# Patient Record
Sex: MALE | Race: WHITE | URBAN - METROPOLITAN AREA
[De-identification: names, ages, dates, MRNs, and addresses within clinical notes are randomized per-mention and may not be internally consistent; named-entity substitution may affect disease eponyms.]

---

## 2017-10-15 ENCOUNTER — OUTPATIENT (OUTPATIENT)
Dept: OUTPATIENT SERVICES | Facility: HOSPITAL | Age: 77
LOS: 1 days | Discharge: HOME | End: 2017-10-15

## 2017-10-15 DIAGNOSIS — R10.11 RIGHT UPPER QUADRANT PAIN: ICD-10-CM

## 2018-07-13 ENCOUNTER — TRANSCRIPTION ENCOUNTER (OUTPATIENT)
Age: 78
End: 2018-07-13

## 2019-08-02 ENCOUNTER — LABORATORY RESULT (OUTPATIENT)
Age: 79
End: 2019-08-02

## 2019-08-02 ENCOUNTER — APPOINTMENT (OUTPATIENT)
Dept: HEMATOLOGY ONCOLOGY | Facility: CLINIC | Age: 79
End: 2019-08-02
Payer: MEDICARE

## 2019-08-02 VITALS
TEMPERATURE: 96.8 F | DIASTOLIC BLOOD PRESSURE: 79 MMHG | WEIGHT: 216 LBS | SYSTOLIC BLOOD PRESSURE: 158 MMHG | RESPIRATION RATE: 16 BRPM | HEART RATE: 68 BPM | BODY MASS INDEX: 34.72 KG/M2 | HEIGHT: 66 IN

## 2019-08-02 DIAGNOSIS — Z87.891 PERSONAL HISTORY OF NICOTINE DEPENDENCE: ICD-10-CM

## 2019-08-02 LAB
HCT VFR BLD CALC: 41.3 %
HGB BLD-MCNC: 14.4 G/DL
MCHC RBC-ENTMCNC: 31.9 PG
MCHC RBC-ENTMCNC: 34.9 G/DL
MCV RBC AUTO: 91.4 FL
PLATELET # BLD AUTO: 84 K/UL
PMV BLD: 10.4 FL
RBC # BLD: 4.52 M/UL
RBC # FLD: 12.4 %
WBC # FLD AUTO: 3.26 K/UL

## 2019-08-02 PROCEDURE — 99204 OFFICE O/P NEW MOD 45 MIN: CPT

## 2019-08-02 RX ORDER — PIOGLITAZONE HYDROCHLORIDE 30 MG/1
30 TABLET ORAL
Qty: 90 | Refills: 0 | Status: ACTIVE | COMMUNITY
Start: 2019-02-06

## 2019-08-02 RX ORDER — ISOSORBIDE MONONITRATE 30 MG/1
30 TABLET, EXTENDED RELEASE ORAL
Qty: 90 | Refills: 0 | Status: ACTIVE | COMMUNITY
Start: 2019-01-17

## 2019-08-02 RX ORDER — TAMSULOSIN HYDROCHLORIDE 0.4 MG/1
0.4 CAPSULE ORAL
Qty: 30 | Refills: 0 | Status: ACTIVE | COMMUNITY
Start: 2019-01-21

## 2019-08-02 RX ORDER — AZITHROMYCIN 250 MG/1
250 TABLET, FILM COATED ORAL
Qty: 6 | Refills: 0 | Status: COMPLETED | COMMUNITY
Start: 2019-05-01

## 2019-08-02 RX ORDER — DOXYCYCLINE 100 MG/1
100 TABLET, FILM COATED ORAL
Qty: 14 | Refills: 0 | Status: COMPLETED | COMMUNITY
Start: 2019-05-28

## 2019-08-02 RX ORDER — EZETIMIBE 10 MG/1
10 TABLET ORAL
Qty: 90 | Refills: 0 | Status: ACTIVE | COMMUNITY
Start: 2019-06-20

## 2019-08-02 RX ORDER — SITAGLIPTIN 100 MG/1
100 TABLET, FILM COATED ORAL
Qty: 90 | Refills: 0 | Status: COMPLETED | COMMUNITY
Start: 2019-03-27

## 2019-08-02 NOTE — HISTORY OF PRESENT ILLNESS
[de-identified] : 80 yo gentleman is evaluated for decreased platelets and wbc count, noted on CBC obtaned in JUNE 2019. Labs from 2016 showed gradual worsening of thrombocytopenia and neutropenia. Patient mentioned he has a problem with liver although he could not specify.  GI Dr Azul ordered abdomen ultrasound done on July 27, 2019 which showed fatty liver infiltration and splenomagaly. \par Denies fever/ infections\par Denies mucocutaneous bleeding\par No wt loss; no weakness

## 2019-08-02 NOTE — CONSULT LETTER
[Dear  ___] : Dear  [unfilled], [Consult Letter:] : I had the pleasure of evaluating your patient, [unfilled]. [FreeTextEntry2] : Dr Ricardo Burr MD\par Dr Ibis Azul MD

## 2019-08-02 NOTE — ASSESSMENT
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

## 2019-08-05 LAB
ALBUMIN SERPL ELPH-MCNC: 4.1 G/DL
ALP BLD-CCNC: 89 U/L
ALT SERPL-CCNC: 93 U/L
ANION GAP SERPL CALC-SCNC: 15 MMOL/L
AST SERPL-CCNC: 97 U/L
BILIRUB SERPL-MCNC: 0.4 MG/DL
BUN SERPL-MCNC: 18 MG/DL
CALCIUM SERPL-MCNC: 9.8 MG/DL
CHLORIDE SERPL-SCNC: 102 MMOL/L
CO2 SERPL-SCNC: 25 MMOL/L
CREAT SERPL-MCNC: 1.1 MG/DL
FOLATE SERPL-MCNC: >20 NG/ML
GLUCOSE SERPL-MCNC: 181 MG/DL
HBV SURFACE AB SER QL: NONREACTIVE
HBV SURFACE AG SER QL: NONREACTIVE
HCV AB SER QL: NONREACTIVE
HCV S/CO RATIO: 0.19 S/CO
POTASSIUM SERPL-SCNC: 4.6 MMOL/L
PROT SERPL-MCNC: 7.9 G/DL
RHEUMATOID FACT SER QL: <10 IU/ML
SODIUM SERPL-SCNC: 142 MMOL/L
VIT B12 SERPL-MCNC: 597 PG/ML

## 2019-08-06 LAB — SMOOTH MUSCLE AB SER QL IF: NORMAL

## 2019-08-07 LAB — ANA SER IF-ACNC: NEGATIVE

## 2019-08-09 ENCOUNTER — LABORATORY RESULT (OUTPATIENT)
Age: 79
End: 2019-08-09

## 2019-08-09 ENCOUNTER — APPOINTMENT (OUTPATIENT)
Dept: HEMATOLOGY ONCOLOGY | Facility: CLINIC | Age: 79
End: 2019-08-09
Payer: MEDICARE

## 2019-08-09 VITALS
TEMPERATURE: 96.6 F | HEIGHT: 66 IN | HEART RATE: 64 BPM | RESPIRATION RATE: 16 BRPM | WEIGHT: 216 LBS | SYSTOLIC BLOOD PRESSURE: 160 MMHG | DIASTOLIC BLOOD PRESSURE: 71 MMHG | BODY MASS INDEX: 34.72 KG/M2

## 2019-08-09 LAB
HCT VFR BLD CALC: 41.8 %
HGB BLD-MCNC: 14.2 G/DL
MCHC RBC-ENTMCNC: 31.9 PG
MCHC RBC-ENTMCNC: 34 G/DL
MCV RBC AUTO: 93.9 FL
PLATELET # BLD AUTO: 78 K/UL
PMV BLD: 9.7 FL
RBC # BLD: 4.45 M/UL
RBC # FLD: 12.7 %
WBC # FLD AUTO: 3.43 K/UL

## 2019-08-09 PROCEDURE — 99214 OFFICE O/P EST MOD 30 MIN: CPT

## 2019-08-09 NOTE — REASON FOR VISIT
[Blood Count Assessment] : blood count assessment [Follow-Up Visit] : a follow-up visit for [FreeTextEntry2] : Thrombocytopenia and Leukopenia

## 2019-08-09 NOTE — REVIEW OF SYSTEMS
[Negative] : Allergic/Immunologic [FreeTextEntry7] : reports intermittent sharp LLQ pain, no clear triggers, onset 1 day, we will continue to observe

## 2019-08-09 NOTE — HISTORY OF PRESENT ILLNESS
[de-identified] : 80 yo gentleman is evaluated for decreased platelets and wbc count, noted on CBC obtaned in JUNE 2019. Labs from 2016 showed gradual worsening of thrombocytopenia and neutropenia. Patient mentioned he has a problem with liver although he could not specify.  GI Dr Azul ordered abdomen ultrasound done on July 27, 2019 which showed fatty liver infiltration and splenomegaly. \par Denies fever/ infections\par Denies mucocutaneous bleeding\par No wt loss; no weakness\par \par US Abd (10/15/2017)  Slightly enlarged and echogenic liver likely representing hepatic steatosis.  Mild splenomegaly.  Correlation with serology is recommended.  Spleen is 14cm.  Liver 21cm. \par \par LAB WORKUP\par (6/21/19) WBC 4.27, Hgb 13.9, MCV 92.3, , PLT 81, AST 67, ALT 67, ALK Phos 79 \par (5/25/18) AST 61, ALT 64, WBC 3.8, Hgn 13.7, MCV 92.9, PLT 86, ANC 1480 [de-identified] : 8/9/19\par Patient is here for a follow-up visit.  He is feeling well with no complaints.  Most recent CBC show persistent leukopenia and thrombocytopenia; otherwise, lab workup is negative so far.  GI Dr Azul ordered abdomen ultrasound done on July 27, 2019 which showed fatty liver infiltration and splenomegaly.  Patient denies fever, chills or unintentional weight loss.

## 2019-08-09 NOTE — CONSULT LETTER
[Dear  ___] : Dear  [unfilled], [Consult Letter:] : I had the pleasure of evaluating your patient, [unfilled]. [Please see my note below.] : Please see my note below. [Consult Closing:] : Thank you very much for allowing me to participate in the care of this patient.  If you have any questions, please do not hesitate to contact me. [Sincerely,] : Sincerely, [FreeTextEntry2] : Dr Ricardo Burr MD\par Dr Bobo Azul MD [FreeTextEntry3] : Dr. Milner

## 2019-08-09 NOTE — ASSESSMENT
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 Azul\par - Hep B workup ordered\par \par RTC in 1 week

## 2019-08-11 LAB — HBV CORE IGG+IGM SER QL: NONREACTIVE

## 2019-08-12 ENCOUNTER — FORM ENCOUNTER (OUTPATIENT)
Age: 79
End: 2019-08-12

## 2019-08-13 ENCOUNTER — OUTPATIENT (OUTPATIENT)
Dept: OUTPATIENT SERVICES | Facility: HOSPITAL | Age: 79
LOS: 1 days | Discharge: HOME | End: 2019-08-13
Payer: MEDICARE

## 2019-08-13 DIAGNOSIS — D69.6 THROMBOCYTOPENIA, UNSPECIFIED: ICD-10-CM

## 2019-08-13 PROCEDURE — 74183 MRI ABD W/O CNTR FLWD CNTR: CPT | Mod: 26

## 2019-08-14 LAB — SMOOTH MUSCLE AB SER QL IF: NORMAL

## 2019-08-15 LAB
HBV E AB SER QL: NEGATIVE
HBV E AG SER QL: NEGATIVE

## 2019-08-16 ENCOUNTER — APPOINTMENT (OUTPATIENT)
Dept: HEMATOLOGY ONCOLOGY | Facility: CLINIC | Age: 79
End: 2019-08-16
Payer: MEDICARE

## 2019-08-16 ENCOUNTER — OUTPATIENT (OUTPATIENT)
Dept: OUTPATIENT SERVICES | Facility: HOSPITAL | Age: 79
LOS: 1 days | Discharge: HOME | End: 2019-08-16

## 2019-08-16 VITALS
TEMPERATURE: 96.9 F | DIASTOLIC BLOOD PRESSURE: 74 MMHG | WEIGHT: 218 LBS | SYSTOLIC BLOOD PRESSURE: 146 MMHG | RESPIRATION RATE: 16 BRPM | HEIGHT: 66 IN | HEART RATE: 70 BPM | BODY MASS INDEX: 35.03 KG/M2

## 2019-08-16 DIAGNOSIS — D69.6 THROMBOCYTOPENIA, UNSPECIFIED: ICD-10-CM

## 2019-08-16 PROCEDURE — 99215 OFFICE O/P EST HI 40 MIN: CPT

## 2019-08-16 NOTE — HISTORY OF PRESENT ILLNESS
[de-identified] : 78 yo gentleman is evaluated for decreased platelets and wbc count, noted on CBC obtaned in JUNE 2019. Labs from 2016 showed gradual worsening of thrombocytopenia and neutropenia. Patient mentioned he has a problem with liver although he could not specify.  GI Dr Azul ordered abdomen ultrasound done on July 27, 2019 which showed fatty liver infiltration and splenomegaly. Patient does note that he was in [place] [facility] [place] [facility] and was treated over 25 years ago in [place], unsure which medications.  \par Denies fever/ infections\par Denies mucocutaneous bleeding\par No wt loss; no weakness\par \par US Abd (10/15/2017)  Slightly enlarged and echogenic liver likely representing hepatic steatosis.  Mild splenomegaly.  Correlation with serology is recommended.  Spleen is 14cm.  Liver 21cm. \par \par LAB WORKUP\par (6/21/19) WBC 4.27, Hgb 13.9, MCV 92.3, , PLT 81, AST 67, ALT 67, ALK Phos 79 \par (5/25/18) AST 61, ALT 64, WBC 3.8, Hgb 13.7, MCV 92.9, PLT 86, ANC 1480 [de-identified] : 8/9/19\par Patient is here for a follow-up visit.  He is feeling well with no complaints.  Most recent CBC show persistent leukopenia and thrombocytopenia; otherwise, lab workup is negative so far.  GI Dr Azul ordered abdomen ultrasound done on July 27, 2019 which showed fatty liver infiltration and splenomegaly.  Patient denies fever, chills or unintentional weight loss.  \par \par 8/16/19\par He is here for follow-up visit.  Hepatitis B workup is negative.  No masses noted on abdominal scan, but mild hepatomegaly.  CBC from 8/9 shows persistent leukopenia and thrombocytopenia.  He is feeling well, no new complaints.  \par MR Abd (8/13/19) IMPRESSION:1.  Mild hepatomegaly, without focal hepatic lesion.2.  No biliary ductal dilatation or evidence of choledocholithiasis.

## 2019-08-16 NOTE — REASON FOR VISIT
[Follow-Up Visit] : a follow-up visit for [Blood Count Assessment] : blood count assessment [FreeTextEntry2] : Thrombocytopenia and Leukopenia

## 2019-08-16 NOTE — ASSESSMENT
[FreeTextEntry1] : 78 yo man with ECOG 1 and medical problems of DM and HTN, is referred for evaluation of low platelets and neutropenia since at least 2016, gradually worsening: this might be attributed to liver disease. Ultrasound suggested fatty liver and splenomegaly. MRI (08/2019) showed hepatomegaly and no focal lesions.\par \par - suspect that cytopenias are due to liver disease:  follow-up with GI and/or Hepatologist for liver workup\par - discussed that his condition is likely caused by liver condition, as supported by elevated AST/ALT\par - encouraged dietary modification and exercise for weight loss\par - bone marrow biopsy  not warranted at this time, will continue to monitor counts, if the counts worsen or do not improve despite liver improvement,  we may re-evaluate\par - noted that pt has been treated for infectious disease in 80s-90s while worked in [place]. Not clear what was the etiology and what treatment he received, to determine if there was an effect on liver or/and on bone marrow: at this time his counts have been stable since at least 5 years ago and he is not symptomatic from cytopenias\par \par RTC in 3 months, ideally following eval by hepatology; with CBC, CMP, PT/INR/ aPTT two days prior

## 2019-08-16 NOTE — CONSULT LETTER
[Consult Letter:] : I had the pleasure of evaluating your patient, [unfilled]. [Dear  ___] : Dear  [unfilled], [Please see my note below.] : Please see my note below. [Consult Closing:] : Thank you very much for allowing me to participate in the care of this patient.  If you have any questions, please do not hesitate to contact me. [Sincerely,] : Sincerely, [FreeTextEntry2] : Dr. Ricardo Burr MD\par  [FreeTextEntry3] : Juancho Milner DO\par Attending Physician,\par Hematology/ Medical Oncology\par [phone] office\par \par

## 2019-11-15 ENCOUNTER — OUTPATIENT (OUTPATIENT)
Dept: OUTPATIENT SERVICES | Facility: HOSPITAL | Age: 79
LOS: 1 days | Discharge: HOME | End: 2019-11-15
Payer: MEDICARE

## 2019-11-15 ENCOUNTER — APPOINTMENT (OUTPATIENT)
Dept: HEMATOLOGY ONCOLOGY | Facility: CLINIC | Age: 79
End: 2019-11-15

## 2019-11-15 DIAGNOSIS — D69.6 THROMBOCYTOPENIA, UNSPECIFIED: ICD-10-CM

## 2019-11-15 PROCEDURE — 74183 MRI ABD W/O CNTR FLWD CNTR: CPT | Mod: 26

## 2019-12-06 ENCOUNTER — APPOINTMENT (OUTPATIENT)
Dept: HEMATOLOGY ONCOLOGY | Facility: CLINIC | Age: 79
End: 2019-12-06
Payer: MEDICARE

## 2019-12-06 ENCOUNTER — LABORATORY RESULT (OUTPATIENT)
Age: 79
End: 2019-12-06

## 2019-12-06 ENCOUNTER — OUTPATIENT (OUTPATIENT)
Dept: OUTPATIENT SERVICES | Facility: HOSPITAL | Age: 79
LOS: 1 days | Discharge: HOME | End: 2019-12-06

## 2019-12-06 VITALS
TEMPERATURE: 98.2 F | HEART RATE: 77 BPM | DIASTOLIC BLOOD PRESSURE: 62 MMHG | SYSTOLIC BLOOD PRESSURE: 138 MMHG | HEIGHT: 66 IN | BODY MASS INDEX: 36 KG/M2 | RESPIRATION RATE: 16 BRPM | WEIGHT: 224 LBS

## 2019-12-06 DIAGNOSIS — Z00.00 ENCOUNTER FOR GENERAL ADULT MEDICAL EXAMINATION W/OUT ABNORMAL FINDINGS: ICD-10-CM

## 2019-12-06 LAB
ALBUMIN SERPL ELPH-MCNC: 4.1 G/DL
ALP BLD-CCNC: 79 U/L
ALT SERPL-CCNC: 50 U/L
ANION GAP SERPL CALC-SCNC: 16 MMOL/L
AST SERPL-CCNC: 50 U/L
BILIRUB SERPL-MCNC: 0.4 MG/DL
BUN SERPL-MCNC: 14 MG/DL
CALCIUM SERPL-MCNC: 9 MG/DL
CHLORIDE SERPL-SCNC: 102 MMOL/L
CO2 SERPL-SCNC: 21 MMOL/L
CREAT SERPL-MCNC: 1 MG/DL
GLUCOSE SERPL-MCNC: 262 MG/DL
HCT VFR BLD CALC: 39.2 %
HGB BLD-MCNC: 13.4 G/DL
MCHC RBC-ENTMCNC: 31.8 PG
MCHC RBC-ENTMCNC: 34.2 G/DL
MCV RBC AUTO: 93.1 FL
PLATELET # BLD AUTO: 65 K/UL
PMV BLD: 9.5 FL
POTASSIUM SERPL-SCNC: 4.9 MMOL/L
PROT SERPL-MCNC: 6.9 G/DL
RBC # BLD: 4.21 M/UL
RBC # FLD: 12.3 %
SODIUM SERPL-SCNC: 139 MMOL/L
WBC # FLD AUTO: 2.82 K/UL

## 2019-12-06 PROCEDURE — 99213 OFFICE O/P EST LOW 20 MIN: CPT

## 2019-12-06 NOTE — CONSULT LETTER
[Dear  ___] : Dear  [unfilled], [Consult Letter:] : I had the pleasure of evaluating your patient, [unfilled]. [Please see my note below.] : Please see my note below. [Consult Closing:] : Thank you very much for allowing me to participate in the care of this patient.  If you have any questions, please do not hesitate to contact me. [Sincerely,] : Sincerely, [FreeTextEntry2] : Dr. Ricardo Burr MD\par  [FreeTextEntry3] : Juancho Milner DO\par Attending Physician,\par Hematology/ Medical Oncology\par [phone] office\par \par

## 2019-12-06 NOTE — ASSESSMENT
[FreeTextEntry1] : 78 yo man with ECOG 1 and medical problems of DM and HTN, is referred for evaluation of low platelets and neutropenia since at least 2016, gradually worsening: this might be attributed to liver disease. Ultrasound suggested fatty liver and splenomegaly. MRI (08/2019) showed hepatomegaly and no focal lesions.\par \par - suspect that cytopenias are due to liver disease:  follow-up with GI and/or Hepatologist for liver workup\par - discussed that his condition is likely caused by liver condition, as supported by elevated AST/ALT; HE DID NOT SEE GI. Encouraged again to see a liver specialist; recommend to repeat cbc in 3 mos; if decrease is dramatic, will offer bone marrow bx\par - encouraged dietary modification and exercise for weight loss\par - bone marrow biopsy  not warranted at this time, will continue to monitor counts, if the counts worsen or do not improve despite liver improvement,  we may re-evaluate\par - noted that pt has been treated for infectious disease in 80s-90s while worked in [place]. Not clear what was the etiology and what treatment he received, to determine if there was an effect on liver or/and on bone marrow: at this time his counts have been stable since at least 5 years ago and he is not symptomatic from cytopenias\par \par RTC in 3 months, ideally following eval by hepatology; with CBC, CMP, PT/INR/ aPTT two days prior

## 2019-12-06 NOTE — PHYSICAL EXAM
[Restricted in physically strenuous activity but ambulatory and able to carry out work of a light or sedentary nature] : Status 1- Restricted in physically strenuous activity but ambulatory and able to carry out work of a light or sedentary nature, e.g., light house work, office work [Obese] : obese [Normal] : grossly intact

## 2019-12-06 NOTE — HISTORY OF PRESENT ILLNESS
[de-identified] : 80 yo gentleman is evaluated for decreased platelets and wbc count, noted on CBC obtaned in JUNE 2019. Labs from 2016 showed gradual worsening of thrombocytopenia and neutropenia. Patient mentioned he has a problem with liver although he could not specify.  GI Dr Azul ordered abdomen ultrasound done on July 27, 2019 which showed fatty liver infiltration and splenomegaly. Patient does note that he was in [place] [facility] [place] [facility] and was treated over 25 years ago in [place], unsure which medications.  \par Denies fever/ infections\par Denies mucocutaneous bleeding\par No wt loss; no weakness\par \par US Abd (10/15/2017)  Slightly enlarged and echogenic liver likely representing hepatic steatosis.  Mild splenomegaly.  Correlation with serology is recommended.  Spleen is 14cm.  Liver 21cm. \par \par LAB WORKUP\par (6/21/19) WBC 4.27, Hgb 13.9, MCV 92.3, , PLT 81, AST 67, ALT 67, ALK Phos 79 \par (5/25/18) AST 61, ALT 64, WBC 3.8, Hgb 13.7, MCV 92.9, PLT 86, ANC 1480 [de-identified] : 8/9/19\par Patient is here for a follow-up visit.  He is feeling well with no complaints.  Most recent CBC show persistent leukopenia and thrombocytopenia; otherwise, lab workup is negative so far.  GI Dr Azul ordered abdomen ultrasound done on July 27, 2019 which showed fatty liver infiltration and splenomegaly.  Patient denies fever, chills or unintentional weight loss.  \par \par 8/16/19\par He is here for follow-up visit.  Hepatitis B workup is negative.  No masses noted on abdominal scan, but mild hepatomegaly.  CBC from 8/9 shows persistent leukopenia and thrombocytopenia.  He is feeling well, no new complaints.  \par MR Abd (8/13/19) IMPRESSION:1.  Mild hepatomegaly, without focal hepatic lesion.2.  No biliary ductal dilatation or evidence of choledocholithiasis.\par \par EXAM:  MR ABDOMEN WAW IC      \par \par \par PROCEDURE DATE:  11/15/2019  \par \par \par \par \par INTERPRETATION:  CLINICAL HISTORY : Thrombocytopenia. Abnormal LFTs.\par \par TECHNIQUE: Multiplanar multisequential MRI images of the abdomen were \par performed before and after the administration of 10 cc Gadavist \par intravenous contrast.\par \par COMPARISON: MRI of the abdomen performed 8/13/2019.\par \par FINDINGS:\par \par HEPATOBILIARY: Mild hepatic steatosis and hepatomegaly. Stable \par subcentimeter right hepatic lobe cyst (series 7/29). No extrahepatic or \par intrahepatic biliary ductal dilation. The gallbladder is unremarkable\par \par SPLEEN: Unremarkable.\par \par PANCREAS: Unremarkable. No pancreatic ductal dilation.\par \par ADRENAL GLANDS: Unremarkable.\par \par KIDNEYS: Symmetric renal enhancement. No hydronephrosis.\par \par ABDOMINAL NODES: No adenopathy.\par \par NOTE: Haziness is noted around the inferior mesenteric artery proximally. \par Findings may be related to a vasculitis and a CT angiogram is recommended \par for further evaluation.\par \par IMPRESSION:\par \par 1. Mild Hepatomegaly and hepatic steatosis.\par \par 2. Haziness is noted around the inferior mesenteric artery proximally. \par \par Findings may be related to a vasculitis and a CT angiogram is recommended \par for further evaluation.\par \par \par \par \par GUZMAN BRENNAN M.D., RESIDENT RADIOLOGIST\par This document has been electronically signed.\par CÉSAR HAYES M.D., ATTENDING RADIOLOGIST\par This document has been electronically signed. Nov 15 2019 11:17AM\par   \par \par   \par \par \par [id number]^RI^DC

## 2019-12-09 LAB
APTT BLD: 31.7 SEC
INR PPP: 1.15 RATIO
PT BLD: 13.2 SEC

## 2019-12-10 DIAGNOSIS — D69.6 THROMBOCYTOPENIA, UNSPECIFIED: ICD-10-CM

## 2020-03-06 ENCOUNTER — APPOINTMENT (OUTPATIENT)
Dept: HEMATOLOGY ONCOLOGY | Facility: CLINIC | Age: 80
End: 2020-03-06

## 2020-03-13 ENCOUNTER — APPOINTMENT (OUTPATIENT)
Dept: CARDIOLOGY | Facility: CLINIC | Age: 80
End: 2020-03-13
Payer: MEDICARE

## 2020-03-13 PROCEDURE — 99214 OFFICE O/P EST MOD 30 MIN: CPT

## 2020-03-13 PROCEDURE — 93000 ELECTROCARDIOGRAM COMPLETE: CPT

## 2020-03-25 ENCOUNTER — APPOINTMENT (OUTPATIENT)
Dept: CARDIOLOGY | Facility: CLINIC | Age: 80
End: 2020-03-25
Payer: MEDICARE

## 2020-03-25 PROCEDURE — 93306 TTE W/DOPPLER COMPLETE: CPT

## 2020-11-19 ENCOUNTER — OUTPATIENT (OUTPATIENT)
Dept: OUTPATIENT SERVICES | Facility: HOSPITAL | Age: 80
LOS: 1 days | Discharge: HOME | End: 2020-11-19
Payer: MEDICARE

## 2020-11-19 PROCEDURE — 71046 X-RAY EXAM CHEST 2 VIEWS: CPT | Mod: 26

## 2020-11-24 ENCOUNTER — APPOINTMENT (OUTPATIENT)
Dept: CARDIOLOGY | Facility: CLINIC | Age: 80
End: 2020-11-24
Payer: MEDICARE

## 2020-11-24 VITALS
BODY MASS INDEX: 37.73 KG/M2 | SYSTOLIC BLOOD PRESSURE: 110 MMHG | DIASTOLIC BLOOD PRESSURE: 55 MMHG | WEIGHT: 234.8 LBS | HEIGHT: 66 IN

## 2020-11-24 VITALS — HEART RATE: 73 BPM

## 2020-11-24 PROCEDURE — 99214 OFFICE O/P EST MOD 30 MIN: CPT

## 2020-11-24 PROCEDURE — 93000 ELECTROCARDIOGRAM COMPLETE: CPT

## 2020-11-24 NOTE — REASON FOR VISIT
[FreeTextEntry1] : Patient presents for an over due follow up visit. He has developed progressive dyspnea since his last visit. He never went for the requested adenosine thallium stress test.

## 2020-11-24 NOTE — DISCUSSION/SUMMARY
[FreeTextEntry1] : IV adenosine thallium will be reordered. The patient never went for the test as was requested on his prior visit.\par 2D echo doppler to exclude progression of his AS\par RV in 2 weeks.

## 2020-11-24 NOTE — PHYSICAL EXAM
[General Appearance - Well Developed] : well developed [Normal Appearance] : normal appearance [General Appearance - In No Acute Distress] : no acute distress [Normal Conjunctiva] : the conjunctiva exhibited no abnormalities [Normal Oropharynx] : normal oropharynx [Normal Jugular Venous V Waves Present] : normal jugular venous V waves present [Auscultation Breath Sounds / Voice Sounds] : lungs were clear to auscultation bilaterally [Heart Rate And Rhythm] : heart rate and rhythm were normal [Heart Sounds] : normal S1 and S2 [Arterial Pulses Normal] : the arterial pulses were normal [Edema] : no peripheral edema present [FreeTextEntry1] : Grade III/VI MARIELOS over aortic area [Abdomen Soft] : soft [Abnormal Walk] : normal gait [Skin Turgor] : normal skin turgor [] : no rash [No Venous Stasis] : no venous stasis [Impaired Insight] : insight and judgment were intact

## 2020-11-24 NOTE — REVIEW OF SYSTEMS
[Shortness Of Breath] : shortness of breath [Dyspnea on exertion] : dyspnea during exertion [Chest  Pressure] : chest pressure [Lower Ext Edema] : no extremity edema [Leg Claudication] : no intermittent leg claudication [Palpitations] : no palpitations [Negative] : Heme/Lymph

## 2020-12-12 ENCOUNTER — APPOINTMENT (OUTPATIENT)
Dept: CARDIOLOGY | Facility: CLINIC | Age: 80
End: 2020-12-12
Payer: MEDICARE

## 2020-12-12 PROCEDURE — 93306 TTE W/DOPPLER COMPLETE: CPT

## 2020-12-12 PROCEDURE — 99072 ADDL SUPL MATRL&STAF TM PHE: CPT

## 2020-12-14 ENCOUNTER — OUTPATIENT (OUTPATIENT)
Dept: OUTPATIENT SERVICES | Facility: HOSPITAL | Age: 80
LOS: 1 days | Discharge: HOME | End: 2020-12-14

## 2020-12-14 ENCOUNTER — LABORATORY RESULT (OUTPATIENT)
Age: 80
End: 2020-12-14

## 2020-12-14 DIAGNOSIS — Z11.59 ENCOUNTER FOR SCREENING FOR OTHER VIRAL DISEASES: ICD-10-CM

## 2020-12-16 ENCOUNTER — RESULT REVIEW (OUTPATIENT)
Age: 80
End: 2020-12-16

## 2020-12-16 ENCOUNTER — OUTPATIENT (OUTPATIENT)
Dept: OUTPATIENT SERVICES | Facility: HOSPITAL | Age: 80
LOS: 1 days | Discharge: HOME | End: 2020-12-16
Payer: MEDICARE

## 2020-12-16 DIAGNOSIS — R07.9 CHEST PAIN, UNSPECIFIED: ICD-10-CM

## 2020-12-16 PROCEDURE — 93018 CV STRESS TEST I&R ONLY: CPT

## 2020-12-16 PROCEDURE — 93016 CV STRESS TEST SUPVJ ONLY: CPT

## 2020-12-16 PROCEDURE — 78452 HT MUSCLE IMAGE SPECT MULT: CPT | Mod: 26

## 2020-12-16 RX ORDER — ADENOSINE 3 MG/ML
60 INJECTION INTRAVENOUS ONCE
Refills: 0 | Status: COMPLETED | OUTPATIENT
Start: 2020-12-16 | End: 2020-12-16

## 2020-12-16 RX ADMIN — ADENOSINE 600 MILLIGRAM(S): 3 INJECTION INTRAVENOUS at 10:13

## 2021-01-26 ENCOUNTER — APPOINTMENT (OUTPATIENT)
Dept: SURGERY | Facility: CLINIC | Age: 81
End: 2021-01-26
Payer: MEDICARE

## 2021-01-26 VITALS
HEIGHT: 67 IN | OXYGEN SATURATION: 98 % | TEMPERATURE: 97.2 F | WEIGHT: 235 LBS | HEART RATE: 73 BPM | BODY MASS INDEX: 36.88 KG/M2

## 2021-01-26 VITALS — DIASTOLIC BLOOD PRESSURE: 75 MMHG | SYSTOLIC BLOOD PRESSURE: 118 MMHG

## 2021-01-26 PROCEDURE — 99072 ADDL SUPL MATRL&STAF TM PHE: CPT

## 2021-01-26 PROCEDURE — 99202 OFFICE O/P NEW SF 15 MIN: CPT

## 2021-01-26 RX ORDER — LISINOPRIL 2.5 MG/1
2.5 TABLET ORAL
Qty: 90 | Refills: 0 | Status: COMPLETED | COMMUNITY
Start: 2019-06-20 | End: 2021-01-26

## 2021-01-26 RX ORDER — GUAIFENESIN AND CODEINE PHOSPHATE 10; 100 MG/5ML; MG/5ML
100-10 SOLUTION ORAL
Qty: 120 | Refills: 0 | Status: COMPLETED | COMMUNITY
Start: 2019-05-28 | End: 2021-01-26

## 2021-01-29 ENCOUNTER — OUTPATIENT (OUTPATIENT)
Dept: OUTPATIENT SERVICES | Facility: HOSPITAL | Age: 81
LOS: 1 days | Discharge: HOME | End: 2021-01-29
Payer: MEDICARE

## 2021-01-29 DIAGNOSIS — E78.2 MIXED HYPERLIPIDEMIA: ICD-10-CM

## 2021-01-29 DIAGNOSIS — M54.6 PAIN IN THORACIC SPINE: ICD-10-CM

## 2021-01-29 DIAGNOSIS — R10.811 RIGHT UPPER QUADRANT ABDOMINAL TENDERNESS: ICD-10-CM

## 2021-01-29 DIAGNOSIS — R10.9 UNSPECIFIED ABDOMINAL PAIN: ICD-10-CM

## 2021-01-29 DIAGNOSIS — I10 ESSENTIAL (PRIMARY) HYPERTENSION: ICD-10-CM

## 2021-01-29 PROCEDURE — 71260 CT THORAX DX C+: CPT | Mod: 26

## 2021-01-29 PROCEDURE — 74177 CT ABD & PELVIS W/CONTRAST: CPT | Mod: 26

## 2021-02-11 NOTE — REVIEW OF SYSTEMS
[Negative] : Constitutional [Abdominal Pain] : no abdominal pain [Vomiting] : no vomiting [Constipation] : no constipation [Diarrhea] : no diarrhea [Heartburn] : no heartburn

## 2021-02-11 NOTE — PHYSICAL EXAM
[de-identified] : Elderly male ambulatory not happy [de-identified] : Tenderness right upper back chest wall posteriorly. No palpable lipoma or soft tissue mass

## 2021-02-11 NOTE — HISTORY OF PRESENT ILLNESS
[de-identified] : 79 year old male with multiple medical issues presents for possible excision of lipoma  as told by urgent care as his cause of pain. Patient had c/o pain right back for past 2 weeks and is not relieved.

## 2021-02-11 NOTE — ASSESSMENT
[FreeTextEntry1] : Elderly male with multiple prior medical issues presents for right back pain and insist upon having surgery to take out the lipoma to take care of his pain.Patient clinically does not have lipoma at the site and was so advised and suggested to follow up his PMD regarding the pain and workup prior to any decision regarding surgery.

## 2022-05-28 ENCOUNTER — EMERGENCY (EMERGENCY)
Facility: HOSPITAL | Age: 82
LOS: 0 days | Discharge: HOME | End: 2022-05-28
Attending: EMERGENCY MEDICINE | Admitting: EMERGENCY MEDICINE
Payer: MEDICARE

## 2022-05-28 VITALS
WEIGHT: 223.11 LBS | TEMPERATURE: 98 F | DIASTOLIC BLOOD PRESSURE: 69 MMHG | OXYGEN SATURATION: 99 % | RESPIRATION RATE: 18 BRPM | HEART RATE: 83 BPM | SYSTOLIC BLOOD PRESSURE: 123 MMHG

## 2022-05-28 DIAGNOSIS — R20.2 PARESTHESIA OF SKIN: ICD-10-CM

## 2022-05-28 DIAGNOSIS — M79.661 PAIN IN RIGHT LOWER LEG: ICD-10-CM

## 2022-05-28 DIAGNOSIS — M62.838 OTHER MUSCLE SPASM: ICD-10-CM

## 2022-05-28 DIAGNOSIS — E11.9 TYPE 2 DIABETES MELLITUS WITHOUT COMPLICATIONS: ICD-10-CM

## 2022-05-28 DIAGNOSIS — M79.662 PAIN IN LEFT LOWER LEG: ICD-10-CM

## 2022-05-28 DIAGNOSIS — R45.1 RESTLESSNESS AND AGITATION: ICD-10-CM

## 2022-05-28 DIAGNOSIS — D69.6 THROMBOCYTOPENIA, UNSPECIFIED: ICD-10-CM

## 2022-05-28 DIAGNOSIS — E78.5 HYPERLIPIDEMIA, UNSPECIFIED: ICD-10-CM

## 2022-05-28 DIAGNOSIS — I10 ESSENTIAL (PRIMARY) HYPERTENSION: ICD-10-CM

## 2022-05-28 LAB
ALBUMIN SERPL ELPH-MCNC: 4.1 G/DL — SIGNIFICANT CHANGE UP (ref 3.5–5.2)
ALP SERPL-CCNC: 67 U/L — SIGNIFICANT CHANGE UP (ref 30–115)
ALT FLD-CCNC: 41 U/L — SIGNIFICANT CHANGE UP (ref 0–41)
ANION GAP SERPL CALC-SCNC: 12 MMOL/L — SIGNIFICANT CHANGE UP (ref 7–14)
AST SERPL-CCNC: 37 U/L — SIGNIFICANT CHANGE UP (ref 0–41)
BASOPHILS # BLD AUTO: 0 K/UL — SIGNIFICANT CHANGE UP (ref 0–0.2)
BASOPHILS NFR BLD AUTO: 0 % — SIGNIFICANT CHANGE UP (ref 0–1)
BILIRUB SERPL-MCNC: 0.4 MG/DL — SIGNIFICANT CHANGE UP (ref 0.2–1.2)
BUN SERPL-MCNC: 18 MG/DL — SIGNIFICANT CHANGE UP (ref 10–20)
CALCIUM SERPL-MCNC: 9.4 MG/DL — SIGNIFICANT CHANGE UP (ref 8.5–10.1)
CHLORIDE SERPL-SCNC: 105 MMOL/L — SIGNIFICANT CHANGE UP (ref 98–110)
CO2 SERPL-SCNC: 21 MMOL/L — SIGNIFICANT CHANGE UP (ref 17–32)
CREAT SERPL-MCNC: 1.1 MG/DL — SIGNIFICANT CHANGE UP (ref 0.7–1.5)
EGFR: 67 ML/MIN/1.73M2 — SIGNIFICANT CHANGE UP
EOSINOPHIL # BLD AUTO: 0.03 K/UL — SIGNIFICANT CHANGE UP (ref 0–0.7)
EOSINOPHIL NFR BLD AUTO: 0.9 % — SIGNIFICANT CHANGE UP (ref 0–8)
GLUCOSE SERPL-MCNC: 100 MG/DL — HIGH (ref 70–99)
HCT VFR BLD CALC: 38.8 % — LOW (ref 42–52)
HGB BLD-MCNC: 13.2 G/DL — LOW (ref 14–18)
LYMPHOCYTES # BLD AUTO: 1.39 K/UL — SIGNIFICANT CHANGE UP (ref 1.2–3.4)
LYMPHOCYTES # BLD AUTO: 41.1 % — SIGNIFICANT CHANGE UP (ref 20.5–51.1)
MAGNESIUM SERPL-MCNC: 1.8 MG/DL — SIGNIFICANT CHANGE UP (ref 1.8–2.4)
MANUAL SMEAR VERIFICATION: SIGNIFICANT CHANGE UP
MCHC RBC-ENTMCNC: 31.6 PG — HIGH (ref 27–31)
MCHC RBC-ENTMCNC: 34 G/DL — SIGNIFICANT CHANGE UP (ref 32–37)
MCV RBC AUTO: 92.8 FL — SIGNIFICANT CHANGE UP (ref 80–94)
MONOCYTES # BLD AUTO: 0.3 K/UL — SIGNIFICANT CHANGE UP (ref 0.1–0.6)
MONOCYTES NFR BLD AUTO: 8.9 % — SIGNIFICANT CHANGE UP (ref 1.7–9.3)
NEUTROPHILS # BLD AUTO: 1.65 K/UL — SIGNIFICANT CHANGE UP (ref 1.4–6.5)
NEUTROPHILS NFR BLD AUTO: 49.1 % — SIGNIFICANT CHANGE UP (ref 42.2–75.2)
NRBC # BLD: 3 /100 — HIGH (ref 0–0)
NRBC # BLD: SIGNIFICANT CHANGE UP /100 WBCS (ref 0–0)
PLAT MORPH BLD: NORMAL — SIGNIFICANT CHANGE UP
PLATELET # BLD AUTO: 78 K/UL — LOW (ref 130–400)
POTASSIUM SERPL-MCNC: 4.5 MMOL/L — SIGNIFICANT CHANGE UP (ref 3.5–5)
POTASSIUM SERPL-SCNC: 4.5 MMOL/L — SIGNIFICANT CHANGE UP (ref 3.5–5)
PROT SERPL-MCNC: 6.9 G/DL — SIGNIFICANT CHANGE UP (ref 6–8)
RBC # BLD: 4.18 M/UL — LOW (ref 4.7–6.1)
RBC # FLD: 13 % — SIGNIFICANT CHANGE UP (ref 11.5–14.5)
RBC BLD AUTO: NORMAL — SIGNIFICANT CHANGE UP
SODIUM SERPL-SCNC: 138 MMOL/L — SIGNIFICANT CHANGE UP (ref 135–146)
WBC # BLD: 3.37 K/UL — LOW (ref 4.8–10.8)
WBC # FLD AUTO: 3.37 K/UL — LOW (ref 4.8–10.8)

## 2022-05-28 PROCEDURE — 99285 EMERGENCY DEPT VISIT HI MDM: CPT

## 2022-05-28 PROCEDURE — 93970 EXTREMITY STUDY: CPT | Mod: 26

## 2022-05-28 PROCEDURE — 99282 EMERGENCY DEPT VISIT SF MDM: CPT

## 2022-05-28 NOTE — ED PROVIDER NOTE - CLINICAL SUMMARY MEDICAL DECISION MAKING FREE TEXT BOX
doppler prelim neg for dvt. labs unremarkable, thrombocytopenia at baseline- pt informed, rec tylenol prn, close f/u pmd 1-2 weeks, strict return precautions provided

## 2022-05-28 NOTE — ED PROVIDER NOTE - PATIENT PORTAL LINK FT
You can access the FollowMyHealth Patient Portal offered by [facility] by registering at the following website: [email or web address]. By joining [facility]’s FollowMyHealth portal, you will also be able to view your health information using other applications (apps) compatible with our system.

## 2022-05-28 NOTE — ED PROVIDER NOTE - ATTENDING APP SHARED VISIT CONTRIBUTION OF CARE
82M PMH DM HTN HL thrombocytopenia, p/w 2 days of bilat lower leg discomfort and restlessness. tingling to bilat LE up to knees. muscle cramps associated. no swelling or redness. no trauma. no numbness, weakness. no cp, sob. pt concern for dvt.     on exam, AFVSS, well yen nad, ncat, eomi, perrla, mmm, lctab, rrr nl s1s2 no mrg, abd soft ntnd, aaox3, no focal deficits, no le edema or calf ttp, normal gait, 2+ dp pulse bilaterally bounding    a/p; LE discomfort bilat, labs, venous doppler re-eval

## 2022-05-28 NOTE — ED PROVIDER NOTE - IV ALTEPLASE EXCL REL HIDDEN
Chart reviewed and it appears that patient has minimal needs for discharge at this time. Discussed with patients nurse and requested that case management be notified if discharge needs are identified. Low risk for hospital readmission (6%). Case management will continue to follow progress and update discharge plan as needed.     Electronically signed by CASSY Oneal on 2/20/2022 at 1:01 PM
show

## 2022-05-28 NOTE — ED ADULT TRIAGE NOTE - CHIEF COMPLAINT QUOTE
Patient c/o bilateral lower extremity pain and restlessness x 2 days. No swelling or redness noted in triage

## 2022-05-28 NOTE — ED PROVIDER NOTE - NS ED ATTENDING STATEMENT MOD
This was a shared visit with the TERE. I reviewed and verified the documentation and independently performed the documented:

## 2022-07-15 ENCOUNTER — INPATIENT (INPATIENT)
Facility: HOSPITAL | Age: 82
LOS: 0 days | Discharge: LEFT BEFORE DISCHARGE | End: 2022-07-16
Attending: HOSPITALIST | Admitting: HOSPITALIST

## 2022-07-15 VITALS
HEART RATE: 104 BPM | SYSTOLIC BLOOD PRESSURE: 128 MMHG | DIASTOLIC BLOOD PRESSURE: 58 MMHG | RESPIRATION RATE: 18 BRPM | OXYGEN SATURATION: 100 %

## 2022-07-15 DIAGNOSIS — F03.90 UNSPECIFIED DEMENTIA WITHOUT BEHAVIORAL DISTURBANCE: ICD-10-CM

## 2022-07-15 LAB
ALBUMIN SERPL ELPH-MCNC: 4.6 G/DL — SIGNIFICANT CHANGE UP (ref 3.5–5.2)
ALP SERPL-CCNC: 65 U/L — SIGNIFICANT CHANGE UP (ref 30–115)
ALT FLD-CCNC: 43 U/L — HIGH (ref 0–41)
ANION GAP SERPL CALC-SCNC: 13 MMOL/L — SIGNIFICANT CHANGE UP (ref 7–14)
APTT BLD: 32.8 SEC — SIGNIFICANT CHANGE UP (ref 27–39.2)
AST SERPL-CCNC: 47 U/L — HIGH (ref 0–41)
BASOPHILS # BLD AUTO: 0.04 K/UL — SIGNIFICANT CHANGE UP (ref 0–0.2)
BASOPHILS NFR BLD AUTO: 0.8 % — SIGNIFICANT CHANGE UP (ref 0–1)
BILIRUB SERPL-MCNC: 0.4 MG/DL — SIGNIFICANT CHANGE UP (ref 0.2–1.2)
BUN SERPL-MCNC: 34 MG/DL — HIGH (ref 10–20)
CALCIUM SERPL-MCNC: 9.8 MG/DL — SIGNIFICANT CHANGE UP (ref 8.5–10.1)
CHLORIDE SERPL-SCNC: 98 MMOL/L — SIGNIFICANT CHANGE UP (ref 98–110)
CO2 SERPL-SCNC: 23 MMOL/L — SIGNIFICANT CHANGE UP (ref 17–32)
CREAT SERPL-MCNC: 1.7 MG/DL — HIGH (ref 0.7–1.5)
EGFR: 40 ML/MIN/1.73M2 — LOW
EOSINOPHIL # BLD AUTO: 0.15 K/UL — SIGNIFICANT CHANGE UP (ref 0–0.7)
EOSINOPHIL NFR BLD AUTO: 2.9 % — SIGNIFICANT CHANGE UP (ref 0–8)
GLUCOSE SERPL-MCNC: 172 MG/DL — HIGH (ref 70–99)
HCT VFR BLD CALC: 38.5 % — LOW (ref 42–52)
HGB BLD-MCNC: 12.9 G/DL — LOW (ref 14–18)
IMM GRANULOCYTES NFR BLD AUTO: 0.4 % — HIGH (ref 0.1–0.3)
INR BLD: 1.14 RATIO — SIGNIFICANT CHANGE UP (ref 0.65–1.3)
LYMPHOCYTES # BLD AUTO: 2.5 K/UL — SIGNIFICANT CHANGE UP (ref 1.2–3.4)
LYMPHOCYTES # BLD AUTO: 48.4 % — SIGNIFICANT CHANGE UP (ref 20.5–51.1)
MCHC RBC-ENTMCNC: 31.2 PG — HIGH (ref 27–31)
MCHC RBC-ENTMCNC: 33.5 G/DL — SIGNIFICANT CHANGE UP (ref 32–37)
MCV RBC AUTO: 93 FL — SIGNIFICANT CHANGE UP (ref 80–94)
MONOCYTES # BLD AUTO: 0.63 K/UL — HIGH (ref 0.1–0.6)
MONOCYTES NFR BLD AUTO: 12.2 % — HIGH (ref 1.7–9.3)
NEUTROPHILS # BLD AUTO: 1.82 K/UL — SIGNIFICANT CHANGE UP (ref 1.4–6.5)
NEUTROPHILS NFR BLD AUTO: 35.3 % — LOW (ref 42.2–75.2)
NRBC # BLD: 0 /100 WBCS — SIGNIFICANT CHANGE UP (ref 0–0)
NT-PROBNP SERPL-SCNC: 106 PG/ML — SIGNIFICANT CHANGE UP (ref 0–300)
PLATELET # BLD AUTO: 95 K/UL — LOW (ref 130–400)
POTASSIUM SERPL-MCNC: 4.8 MMOL/L — SIGNIFICANT CHANGE UP (ref 3.5–5)
POTASSIUM SERPL-SCNC: 4.8 MMOL/L — SIGNIFICANT CHANGE UP (ref 3.5–5)
PROT SERPL-MCNC: 7.5 G/DL — SIGNIFICANT CHANGE UP (ref 6–8)
PROTHROM AB SERPL-ACNC: 13.1 SEC — HIGH (ref 9.95–12.87)
RBC # BLD: 4.14 M/UL — LOW (ref 4.7–6.1)
RBC # FLD: 13.2 % — SIGNIFICANT CHANGE UP (ref 11.5–14.5)
SARS-COV-2 RNA SPEC QL NAA+PROBE: SIGNIFICANT CHANGE UP
SODIUM SERPL-SCNC: 134 MMOL/L — LOW (ref 135–146)
TROPONIN T SERPL-MCNC: <0.01 NG/ML — SIGNIFICANT CHANGE UP
WBC # BLD: 5.16 K/UL — SIGNIFICANT CHANGE UP (ref 4.8–10.8)
WBC # FLD AUTO: 5.16 K/UL — SIGNIFICANT CHANGE UP (ref 4.8–10.8)

## 2022-07-15 PROCEDURE — 93010 ELECTROCARDIOGRAM REPORT: CPT

## 2022-07-15 PROCEDURE — 70498 CT ANGIOGRAPHY NECK: CPT | Mod: 26,MA

## 2022-07-15 PROCEDURE — 70450 CT HEAD/BRAIN W/O DYE: CPT | Mod: 26,MA,59

## 2022-07-15 PROCEDURE — 99285 EMERGENCY DEPT VISIT HI MDM: CPT

## 2022-07-15 PROCEDURE — 0042T: CPT | Mod: MA

## 2022-07-15 PROCEDURE — 71045 X-RAY EXAM CHEST 1 VIEW: CPT | Mod: 26

## 2022-07-15 PROCEDURE — 70496 CT ANGIOGRAPHY HEAD: CPT | Mod: 26,MA

## 2022-07-15 RX ORDER — AMOXICILLIN 250 MG/5ML
1 SUSPENSION, RECONSTITUTED, ORAL (ML) ORAL
Qty: 14 | Refills: 0
Start: 2022-07-15 | End: 2022-07-21

## 2022-07-15 RX ORDER — ASPIRIN/CALCIUM CARB/MAGNESIUM 324 MG
324 TABLET ORAL ONCE
Refills: 0 | Status: COMPLETED | OUTPATIENT
Start: 2022-07-15 | End: 2022-07-15

## 2022-07-15 RX ADMIN — Medication 324 MILLIGRAM(S): at 21:02

## 2022-07-15 NOTE — ED ADULT NURSE NOTE - NSICDXPASTMEDICALHX_GEN_ALL_CORE_FT
PAST MEDICAL HISTORY:  DM (diabetes mellitus)      PAST MEDICAL HISTORY:  BPH (benign prostatic hyperplasia)     DM (diabetes mellitus)     HTN (hypertension)

## 2022-07-15 NOTE — ED ADULT NURSE NOTE - NSIMPLEMENTINTERV_GEN_ALL_ED
Implemented All Universal Safety Interventions:  [place] to call system. Call bell, personal items and telephone within reach. Instruct patient to call for assistance. Room bathroom lighting operational. Non-slip footwear when patient is off stretcher. Physically safe environment: no spills, clutter or unnecessary equipment. Stretcher in lowest position, wheels locked, appropriate side rails in place.

## 2022-07-15 NOTE — ED PROVIDER NOTE - OBJECTIVE STATEMENT
patient c/o blurred vision tonight, resolved PTA, No HA, no chest pain, no dizziness, States he felt like he had to rub his eyes to make it better,

## 2022-07-15 NOTE — ED PROVIDER NOTE - ATTENDING APP SHARED VISIT CONTRIBUTION OF CARE
82-year-old male past medical history diabetes, hypertension, BPH presents with blurry vision.  Patient noted episode of blurred vision that began this morning when he woke up.  Last known well sometime last night, unsure of exact timing.  Patient states blurry vision currently resolved.  No focal weakness, no facial droop, no speech changes, no dizziness, no lightheadedness, no nausea/vomiting/diarrhea, no chest pain, no shortness of breath, no abdominal pain, no back pain, no extremity pain, no gait instability.  Stroke code called upon patient arrival to the ED.    CONSTITUTIONAL: Well-developed; well-nourished; in no acute distress. Sitting up and providing appropriate history and physical examination  SKIN: skin exam is warm and dry, no acute rash.  HEAD: Normocephalic; atraumatic.  EYES: PERRL, 3 mm bilateral, no nystagmus, EOM intact; conjunctiva and sclera clear.  ENT: No nasal discharge; airway clear.  NECK: Supple; non tender. + full passive ROM in all directions. No JVD  CARD: S1, S2 normal; no murmurs, gallops, or rubs. Regular rate and rhythm. + Symmetric Strong Pulses  RESP: No wheezes, rales or rhonchi. Good air movement bilaterally  ABD: soft; non-distended; non-tender. No Rebound, No Guarding, No signs of peritonitis, No CVA tenderness. No pulsatile abdominal mass. + Strong and Symmetric Pulses  EXT: Normal ROM. No clubbing, cyanosis or edema. Dp and Pt Pulses intact. Cap refill less than 3 seconds  NEURO: CN 2-12 intact, normal finger to nose, normal romberg, stable gait, no sensory or motor deficits, Alert, oriented, grossly unremarkable. No Focal deficits. GCS 15. NIH 0  PSYCH: Cooperative, appropriate.

## 2022-07-15 NOTE — ED PROVIDER NOTE - CLINICAL SUMMARY MEDICAL DECISION MAKING FREE TEXT BOX
I personally evaluated the patient. I reviewed the Resident´s or Physician Assistant´s note (as assigned above), and agree with the findings and plan except as documented in my note.  Patient evaluated for vision change.  Stroke code called upon arrival to the ED, patient symptoms resolved, NIH 0 upon arrival.  Labs, EKG, CT head, CT angio head and neck/perfusion performed in the ED.  Discussed with telestroke neurology, recommending 1 dose of full dose aspirin and admission for further evaluation.  Patient given aspirin.  Patient admitted to stepdown unit for further evaluation treatment.

## 2022-07-16 VITALS — DIASTOLIC BLOOD PRESSURE: 65 MMHG | HEART RATE: 64 BPM | SYSTOLIC BLOOD PRESSURE: 136 MMHG | RESPIRATION RATE: 20 BRPM

## 2022-07-16 DIAGNOSIS — Z90.49 ACQUIRED ABSENCE OF OTHER SPECIFIED PARTS OF DIGESTIVE TRACT: Chronic | ICD-10-CM

## 2022-07-16 LAB
A1C WITH ESTIMATED AVERAGE GLUCOSE RESULT: 6.4 % — HIGH (ref 4–5.6)
ALBUMIN SERPL ELPH-MCNC: 4.2 G/DL — SIGNIFICANT CHANGE UP (ref 3.5–5.2)
ALP SERPL-CCNC: 61 U/L — SIGNIFICANT CHANGE UP (ref 30–115)
ALT FLD-CCNC: 40 U/L — SIGNIFICANT CHANGE UP (ref 0–41)
ANION GAP SERPL CALC-SCNC: 12 MMOL/L — SIGNIFICANT CHANGE UP (ref 7–14)
APPEARANCE UR: CLEAR — SIGNIFICANT CHANGE UP
AST SERPL-CCNC: 39 U/L — SIGNIFICANT CHANGE UP (ref 0–41)
BASOPHILS # BLD AUTO: 0.02 K/UL — SIGNIFICANT CHANGE UP (ref 0–0.2)
BASOPHILS NFR BLD AUTO: 0.6 % — SIGNIFICANT CHANGE UP (ref 0–1)
BILIRUB SERPL-MCNC: 0.5 MG/DL — SIGNIFICANT CHANGE UP (ref 0.2–1.2)
BILIRUB UR-MCNC: NEGATIVE — SIGNIFICANT CHANGE UP
BUN SERPL-MCNC: 25 MG/DL — HIGH (ref 10–20)
CALCIUM SERPL-MCNC: 9.2 MG/DL — SIGNIFICANT CHANGE UP (ref 8.5–10.1)
CHLORIDE SERPL-SCNC: 101 MMOL/L — SIGNIFICANT CHANGE UP (ref 98–110)
CHOLEST SERPL-MCNC: 154 MG/DL — SIGNIFICANT CHANGE UP
CO2 SERPL-SCNC: 23 MMOL/L — SIGNIFICANT CHANGE UP (ref 17–32)
COLOR SPEC: YELLOW — SIGNIFICANT CHANGE UP
CREAT ?TM UR-MCNC: 55 MG/DL — SIGNIFICANT CHANGE UP
CREAT SERPL-MCNC: 1.2 MG/DL — SIGNIFICANT CHANGE UP (ref 0.7–1.5)
DIFF PNL FLD: NEGATIVE — SIGNIFICANT CHANGE UP
EGFR: 61 ML/MIN/1.73M2 — SIGNIFICANT CHANGE UP
EOSINOPHIL # BLD AUTO: 0.11 K/UL — SIGNIFICANT CHANGE UP (ref 0–0.7)
EOSINOPHIL NFR BLD AUTO: 3.4 % — SIGNIFICANT CHANGE UP (ref 0–8)
ESTIMATED AVERAGE GLUCOSE: 137 MG/DL — HIGH (ref 68–114)
GLUCOSE BLDC GLUCOMTR-MCNC: 125 MG/DL — HIGH (ref 70–99)
GLUCOSE BLDC GLUCOMTR-MCNC: 153 MG/DL — HIGH (ref 70–99)
GLUCOSE BLDC GLUCOMTR-MCNC: 155 MG/DL — HIGH (ref 70–99)
GLUCOSE SERPL-MCNC: 128 MG/DL — HIGH (ref 70–99)
GLUCOSE UR QL: NEGATIVE MG/DL — SIGNIFICANT CHANGE UP
HCT VFR BLD CALC: 37.4 % — LOW (ref 42–52)
HDLC SERPL-MCNC: 37 MG/DL — LOW
HGB BLD-MCNC: 12.4 G/DL — LOW (ref 14–18)
IMM GRANULOCYTES NFR BLD AUTO: 0.3 % — SIGNIFICANT CHANGE UP (ref 0.1–0.3)
KETONES UR-MCNC: NEGATIVE — SIGNIFICANT CHANGE UP
LEUKOCYTE ESTERASE UR-ACNC: NEGATIVE — SIGNIFICANT CHANGE UP
LIPID PNL WITH DIRECT LDL SERPL: 78 MG/DL — SIGNIFICANT CHANGE UP
LYMPHOCYTES # BLD AUTO: 1.57 K/UL — SIGNIFICANT CHANGE UP (ref 1.2–3.4)
LYMPHOCYTES # BLD AUTO: 48.9 % — SIGNIFICANT CHANGE UP (ref 20.5–51.1)
MAGNESIUM SERPL-MCNC: 1.9 MG/DL — SIGNIFICANT CHANGE UP (ref 1.8–2.4)
MCHC RBC-ENTMCNC: 30.8 PG — SIGNIFICANT CHANGE UP (ref 27–31)
MCHC RBC-ENTMCNC: 33.2 G/DL — SIGNIFICANT CHANGE UP (ref 32–37)
MCV RBC AUTO: 92.8 FL — SIGNIFICANT CHANGE UP (ref 80–94)
MONOCYTES # BLD AUTO: 0.45 K/UL — SIGNIFICANT CHANGE UP (ref 0.1–0.6)
MONOCYTES NFR BLD AUTO: 14 % — HIGH (ref 1.7–9.3)
NEUTROPHILS # BLD AUTO: 1.05 K/UL — LOW (ref 1.4–6.5)
NEUTROPHILS NFR BLD AUTO: 32.8 % — LOW (ref 42.2–75.2)
NEUTS BAND # BLD: 5 % — SIGNIFICANT CHANGE UP (ref 0–6)
NITRITE UR-MCNC: NEGATIVE — SIGNIFICANT CHANGE UP
NON HDL CHOLESTEROL: 117 MG/DL — SIGNIFICANT CHANGE UP
NRBC # BLD: 0 /100 WBCS — SIGNIFICANT CHANGE UP (ref 0–0)
NRBC # BLD: 0 /100 — SIGNIFICANT CHANGE UP (ref 0–0)
PH UR: 6 — SIGNIFICANT CHANGE UP (ref 5–8)
PLAT MORPH BLD: NORMAL — SIGNIFICANT CHANGE UP
PLATELET # BLD AUTO: 75 K/UL — LOW (ref 130–400)
PLATELET COUNT - ESTIMATE: ABNORMAL
POTASSIUM SERPL-MCNC: 4.1 MMOL/L — SIGNIFICANT CHANGE UP (ref 3.5–5)
POTASSIUM SERPL-SCNC: 4.1 MMOL/L — SIGNIFICANT CHANGE UP (ref 3.5–5)
PROT ?TM UR-MCNC: 5 MG/DLG/24H — SIGNIFICANT CHANGE UP
PROT SERPL-MCNC: 7.1 G/DL — SIGNIFICANT CHANGE UP (ref 6–8)
PROT UR-MCNC: NEGATIVE MG/DL — SIGNIFICANT CHANGE UP
PROT/CREAT UR-RTO: 0.1 RATIO — SIGNIFICANT CHANGE UP (ref 0–0.2)
RBC # BLD: 4.03 M/UL — LOW (ref 4.7–6.1)
RBC # FLD: 13 % — SIGNIFICANT CHANGE UP (ref 11.5–14.5)
RBC BLD AUTO: NORMAL — SIGNIFICANT CHANGE UP
SODIUM SERPL-SCNC: 136 MMOL/L — SIGNIFICANT CHANGE UP (ref 135–146)
SODIUM UR-SCNC: 84 MMOL/L — SIGNIFICANT CHANGE UP
SP GR SPEC: 1.01 — SIGNIFICANT CHANGE UP (ref 1.01–1.03)
TRIGL SERPL-MCNC: 197 MG/DL — HIGH
UROBILINOGEN FLD QL: 0.2 MG/DL — SIGNIFICANT CHANGE UP
UUN UR-MCNC: 554 MG/DL — SIGNIFICANT CHANGE UP
WBC # BLD: 3.21 K/UL — LOW (ref 4.8–10.8)
WBC # FLD AUTO: 3.21 K/UL — LOW (ref 4.8–10.8)

## 2022-07-16 PROCEDURE — 99221 1ST HOSP IP/OBS SF/LOW 40: CPT

## 2022-07-16 PROCEDURE — 76770 US EXAM ABDO BACK WALL COMP: CPT | Mod: 26

## 2022-07-16 RX ORDER — ATORVASTATIN CALCIUM 80 MG/1
80 TABLET, FILM COATED ORAL AT BEDTIME
Refills: 0 | Status: DISCONTINUED | OUTPATIENT
Start: 2022-07-17 | End: 2022-07-16

## 2022-07-16 RX ORDER — METFORMIN HYDROCHLORIDE 850 MG/1
0 TABLET ORAL
Qty: 0 | Refills: 0 | DISCHARGE

## 2022-07-16 RX ORDER — MEMANTINE HYDROCHLORIDE 10 MG/1
0 TABLET ORAL
Qty: 0 | Refills: 0 | DISCHARGE

## 2022-07-16 RX ORDER — GABAPENTIN 400 MG/1
1 CAPSULE ORAL
Qty: 0 | Refills: 0 | DISCHARGE

## 2022-07-16 RX ORDER — PREGABALIN 225 MG/1
1 CAPSULE ORAL
Qty: 0 | Refills: 0 | DISCHARGE

## 2022-07-16 RX ORDER — FUROSEMIDE 40 MG
0 TABLET ORAL
Qty: 0 | Refills: 0 | DISCHARGE

## 2022-07-16 RX ORDER — TAMSULOSIN HYDROCHLORIDE 0.4 MG/1
0 CAPSULE ORAL
Qty: 0 | Refills: 0 | DISCHARGE

## 2022-07-16 RX ORDER — ASPIRIN/CALCIUM CARB/MAGNESIUM 324 MG
81 TABLET ORAL DAILY
Refills: 0 | Status: DISCONTINUED | OUTPATIENT
Start: 2022-07-17 | End: 2022-07-16

## 2022-07-16 RX ORDER — PIOGLITAZONE HYDROCHLORIDE 15 MG/1
1 TABLET ORAL
Qty: 0 | Refills: 0 | DISCHARGE

## 2022-07-16 RX ORDER — GABAPENTIN 400 MG/1
0 CAPSULE ORAL
Qty: 0 | Refills: 0 | DISCHARGE

## 2022-07-16 RX ORDER — METFORMIN HYDROCHLORIDE 850 MG/1
1 TABLET ORAL
Qty: 0 | Refills: 0 | DISCHARGE

## 2022-07-16 RX ORDER — PANTOPRAZOLE SODIUM 20 MG/1
40 TABLET, DELAYED RELEASE ORAL
Refills: 0 | Status: DISCONTINUED | OUTPATIENT
Start: 2022-07-16 | End: 2022-07-16

## 2022-07-16 RX ORDER — MAGNESIUM OXIDE 400 MG ORAL TABLET 241.3 MG
1 TABLET ORAL
Qty: 0 | Refills: 0 | DISCHARGE

## 2022-07-16 RX ORDER — INSULIN GLARGINE 100 [IU]/ML
10 INJECTION, SOLUTION SUBCUTANEOUS AT BEDTIME
Refills: 0 | Status: DISCONTINUED | OUTPATIENT
Start: 2022-07-16 | End: 2022-07-16

## 2022-07-16 RX ORDER — SODIUM CHLORIDE 9 MG/ML
1000 INJECTION INTRAMUSCULAR; INTRAVENOUS; SUBCUTANEOUS
Refills: 0 | Status: DISCONTINUED | OUTPATIENT
Start: 2022-07-16 | End: 2022-07-16

## 2022-07-16 RX ORDER — EZETIMIBE 10 MG/1
0 TABLET ORAL
Qty: 0 | Refills: 0 | DISCHARGE

## 2022-07-16 RX ORDER — GABAPENTIN 400 MG/1
150 CAPSULE ORAL THREE TIMES A DAY
Refills: 0 | Status: DISCONTINUED | OUTPATIENT
Start: 2022-07-16 | End: 2022-07-16

## 2022-07-16 RX ORDER — SPIRONOLACTONE 25 MG/1
1 TABLET, FILM COATED ORAL
Qty: 0 | Refills: 0 | DISCHARGE

## 2022-07-16 RX ORDER — INSULIN LISPRO 100/ML
3 VIAL (ML) SUBCUTANEOUS
Refills: 0 | Status: DISCONTINUED | OUTPATIENT
Start: 2022-07-16 | End: 2022-07-16

## 2022-07-16 RX ORDER — SITAGLIPTIN 50 MG/1
1 TABLET, FILM COATED ORAL
Qty: 0 | Refills: 0 | DISCHARGE

## 2022-07-16 RX ORDER — FUROSEMIDE 40 MG
1 TABLET ORAL
Qty: 0 | Refills: 0 | DISCHARGE

## 2022-07-16 RX ORDER — LISINOPRIL 2.5 MG/1
0 TABLET ORAL
Qty: 0 | Refills: 0 | DISCHARGE

## 2022-07-16 RX ORDER — PREGABALIN 225 MG/1
0 CAPSULE ORAL
Qty: 0 | Refills: 0 | DISCHARGE

## 2022-07-16 RX ORDER — MEMANTINE HYDROCHLORIDE 10 MG/1
5 TABLET ORAL AT BEDTIME
Refills: 0 | Status: DISCONTINUED | OUTPATIENT
Start: 2022-07-16 | End: 2022-07-16

## 2022-07-16 RX ORDER — ISOSORBIDE MONONITRATE 60 MG/1
1 TABLET, EXTENDED RELEASE ORAL
Qty: 0 | Refills: 0 | DISCHARGE

## 2022-07-16 RX ORDER — EZETIMIBE 10 MG/1
1 TABLET ORAL
Qty: 0 | Refills: 0 | DISCHARGE

## 2022-07-16 RX ORDER — GABAPENTIN 400 MG/1
100 CAPSULE ORAL THREE TIMES A DAY
Refills: 0 | Status: DISCONTINUED | OUTPATIENT
Start: 2022-07-16 | End: 2022-07-16

## 2022-07-16 RX ORDER — HEPARIN SODIUM 5000 [USP'U]/ML
5000 INJECTION INTRAVENOUS; SUBCUTANEOUS EVERY 12 HOURS
Refills: 0 | Status: DISCONTINUED | OUTPATIENT
Start: 2022-07-16 | End: 2022-07-16

## 2022-07-16 RX ORDER — MEMANTINE HYDROCHLORIDE 10 MG/1
1 TABLET ORAL
Qty: 0 | Refills: 0 | DISCHARGE

## 2022-07-16 RX ORDER — SODIUM CHLORIDE 9 MG/ML
1000 INJECTION, SOLUTION INTRAVENOUS
Refills: 0 | Status: DISCONTINUED | OUTPATIENT
Start: 2022-07-16 | End: 2022-07-16

## 2022-07-16 RX ORDER — DEXTROSE 50 % IN WATER 50 %
25 SYRINGE (ML) INTRAVENOUS ONCE
Refills: 0 | Status: DISCONTINUED | OUTPATIENT
Start: 2022-07-16 | End: 2022-07-16

## 2022-07-16 RX ORDER — MAGNESIUM OXIDE 400 MG ORAL TABLET 241.3 MG
0 TABLET ORAL
Qty: 0 | Refills: 0 | DISCHARGE

## 2022-07-16 RX ORDER — GLUCAGON INJECTION, SOLUTION 0.5 MG/.1ML
1 INJECTION, SOLUTION SUBCUTANEOUS ONCE
Refills: 0 | Status: DISCONTINUED | OUTPATIENT
Start: 2022-07-16 | End: 2022-07-16

## 2022-07-16 RX ORDER — LISINOPRIL 2.5 MG/1
1 TABLET ORAL
Qty: 0 | Refills: 0 | DISCHARGE

## 2022-07-16 RX ORDER — INSULIN LISPRO 100/ML
VIAL (ML) SUBCUTANEOUS
Refills: 0 | Status: DISCONTINUED | OUTPATIENT
Start: 2022-07-16 | End: 2022-07-16

## 2022-07-16 RX ORDER — DEXTROSE 50 % IN WATER 50 %
12.5 SYRINGE (ML) INTRAVENOUS ONCE
Refills: 0 | Status: DISCONTINUED | OUTPATIENT
Start: 2022-07-16 | End: 2022-07-16

## 2022-07-16 RX ORDER — ISOSORBIDE MONONITRATE 60 MG/1
30 TABLET, EXTENDED RELEASE ORAL DAILY
Refills: 0 | Status: DISCONTINUED | OUTPATIENT
Start: 2022-07-16 | End: 2022-07-16

## 2022-07-16 RX ORDER — TAMSULOSIN HYDROCHLORIDE 0.4 MG/1
1 CAPSULE ORAL
Qty: 0 | Refills: 0 | DISCHARGE

## 2022-07-16 RX ORDER — PREGABALIN 225 MG/1
1000 CAPSULE ORAL DAILY
Refills: 0 | Status: DISCONTINUED | OUTPATIENT
Start: 2022-07-16 | End: 2022-07-16

## 2022-07-16 RX ORDER — NATEGLINIDE 60 MG/1
1 TABLET, COATED ORAL
Qty: 0 | Refills: 0 | DISCHARGE

## 2022-07-16 RX ORDER — TAMSULOSIN HYDROCHLORIDE 0.4 MG/1
0.4 CAPSULE ORAL AT BEDTIME
Refills: 0 | Status: DISCONTINUED | OUTPATIENT
Start: 2022-07-16 | End: 2022-07-16

## 2022-07-16 RX ORDER — DEXTROSE 50 % IN WATER 50 %
15 SYRINGE (ML) INTRAVENOUS ONCE
Refills: 0 | Status: DISCONTINUED | OUTPATIENT
Start: 2022-07-16 | End: 2022-07-16

## 2022-07-16 RX ORDER — SPIRONOLACTONE 25 MG/1
0 TABLET, FILM COATED ORAL
Qty: 0 | Refills: 0 | DISCHARGE

## 2022-07-16 RX ADMIN — Medication 0: at 11:49

## 2022-07-16 RX ADMIN — Medication 1: at 08:07

## 2022-07-16 RX ADMIN — Medication 3 UNIT(S): at 11:48

## 2022-07-16 RX ADMIN — GABAPENTIN 100 MILLIGRAM(S): 400 CAPSULE ORAL at 05:51

## 2022-07-16 RX ADMIN — SODIUM CHLORIDE 50 MILLILITER(S): 9 INJECTION INTRAMUSCULAR; INTRAVENOUS; SUBCUTANEOUS at 02:09

## 2022-07-16 RX ADMIN — PANTOPRAZOLE SODIUM 40 MILLIGRAM(S): 20 TABLET, DELAYED RELEASE ORAL at 06:00

## 2022-07-16 RX ADMIN — SODIUM CHLORIDE 50 MILLILITER(S): 9 INJECTION INTRAMUSCULAR; INTRAVENOUS; SUBCUTANEOUS at 08:11

## 2022-07-16 RX ADMIN — GABAPENTIN 100 MILLIGRAM(S): 400 CAPSULE ORAL at 13:08

## 2022-07-16 RX ADMIN — ISOSORBIDE MONONITRATE 30 MILLIGRAM(S): 60 TABLET, EXTENDED RELEASE ORAL at 13:08

## 2022-07-16 RX ADMIN — HEPARIN SODIUM 5000 UNIT(S): 5000 INJECTION INTRAVENOUS; SUBCUTANEOUS at 05:51

## 2022-07-16 RX ADMIN — PREGABALIN 1000 MICROGRAM(S): 225 CAPSULE ORAL at 13:08

## 2022-07-16 RX ADMIN — Medication 3 UNIT(S): at 08:07

## 2022-07-16 NOTE — OCCUPATIONAL THERAPY INITIAL EVALUATION ADULT - NAME OF CLINICIAN
Patient questioning when to use the lidocaine gel he was prescribed  Patient scheduled for surgery on 3/12/18  Per Dr Kevin Delgado, lidocaine gel used for pain relief and lubrication during Peck catheter changes  Patient verbalized understanding 
NEGAR Garsia

## 2022-07-16 NOTE — H&P ADULT - NSHPLABSRESULTS_GEN_ALL_CORE
(07-15 @ 19:35)                      12.9  5.16 )-----------( 95                 38.5    Neutrophils = 1.82 (35.3%)  Lymphocytes = 2.50 (48.4%)  Eosinophils = 0.15 (2.9%)  Basophils = 0.04 (0.8%)  Monocytes = 0.63 (12.2%)  Bands = --%    07-15    134<L>  |  98  |  34<H>  ----------------------------<  172<H>  4.8   |  23  |  1.7<H>    Ca    9.8      15 Jul 2022 19:35    TPro  7.5  /  Alb  4.6  /  TBili  0.4  /  DBili  x   /  AST  47<H>  /  ALT  43<H>  /  AlkPhos  65  07-15      ( 15 Jul 2022 19:35 )   PT: 13.10 sec;   INR: 1.14 ratio;       PTT:32.8 sec  CARDIAC MARKERS ( 15 Jul 2022 19:35 )  Trop <0.01 ng/mL / CK x     / CKMB x           Serum Pro-Brain Natriuretic Peptide: 106 pg/mL (07-15-22 @ 19:36)      < from: CT Angio Neck Stroke Protocol w/ IV Cont (07.15.22 @ 20:07) >    IMPRESSION:    There is 7 mL perfusion abnormality in the region of bilateral   presents/cerebellum, likely artifactual. Otherwise, no intracranial   perfusion defect.    No intracranial large vessel filling defect. Multifocal mild stenosis of   left M1 segment.    Focal mild stenoses of bilateral cervical internal carotids secondary to   atherosclerosis.    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    < end of copied text >    < from: CT Brain Stroke Protocol (07.15.22 @ 19:44) >    IMPRESSION:    No evidence of acute intracranial hemorrhage or large territorial infarct.    Mild chronic microvascular changes.    < end of copied text >

## 2022-07-16 NOTE — PHYSICAL THERAPY INITIAL EVALUATION ADULT - ADDITIONAL COMMENTS
Per patient , lives with wife in private home with no steps to enter home but has 11 steps with 2 rails inside home; was walking without a device

## 2022-07-16 NOTE — PHYSICAL THERAPY INITIAL EVALUATION ADULT - PERTINENT HX OF CURRENT PROBLEM, REHAB EVAL
83 y/o male admitted for TIA after noting eye vision change, stroke code was called in ED but symptoms have resolved, no acute pathology on Head CT, awaiting MRI

## 2022-07-16 NOTE — H&P ADULT - NSHPSOCIALHISTORY_GEN_ALL_CORE
Substance Use (street drugs): ( x ) never used  (  ) other:  Tobacco Usage:  (   ) never smoked   ( x  ) former smoker : quit 20 years ago; >20 pack years history  (   ) current smoker  (     ) pack year  Alcohol Usage: occasional

## 2022-07-16 NOTE — OCCUPATIONAL THERAPY INITIAL EVALUATION ADULT - GENERAL OBSERVATIONS, REHAB EVAL
09:29-09:52; chart reviewed, ok to treat by Occupational Therapist as confirmed by RN Sun, Pt received semifowler +tele +pulse ox (IV disconnected by RN) in NAD. Pt denied pain at rest and in agreement with OT IE.

## 2022-07-16 NOTE — H&P ADULT - NSICDXPASTMEDICALHX_GEN_ALL_CORE_FT
PAST MEDICAL HISTORY:  BPH (benign prostatic hyperplasia)     DM (diabetes mellitus)     History of viral hepatitis Treated more than 20 years ago    HTN (hypertension)     Malaria Treated more than 20 years ago while he was working in [place]    Schistosomosis Treated more than 20 years ago while he was working in [place]

## 2022-07-16 NOTE — H&P ADULT - ASSESSMENT
IMPRESSION:  Transient blurring of vision - TIA vs stroke  GUERDA  h/o HTN  h/o DM  h/o Moderate AS  Chronic thrombocytopenia  ?dementia    PLAN:  CNS:   - Avoid CNS Depressants   - continue with Gabapentin, Memantine  - Neuro Checks q4h  - MR brain  - Start ASA, statin  - Neuro f/u    HEENT:   - Oral care  - Aspiration precautions   - Passed bedside swallow eval    PULMONARY:   - HOB @ 45.   - Monitor Pulse Ox. Keep > 93%. Supplement as needed.    CARDIOVASCULAR:   - Daily Weight.   - Strict I&Os. Keep I < O  - Telemetry monitoring  - Last echo (12/12/2020): EF 71%, Gr I Diastolic dysfunction, Moderate AS  - Repeat Echo    GI:   - GI prophylaxis: PPI  - Diet: DASH/TLC, carb consistent diet    RENAL:   - Monitor BMP/Cr  - Avoid nephrotoxic agents  - Check US-Renal  - Follow up UA, urine lytes and Protein/cr ratio  - Gentle IV hydration  - Trend Cr  - Nephro eval if no improvement with IV hydration  - Hold Losartan/Lasix/Spironolactone     INFECTIOUS DISEASE:   - Monitor WBC  - Trend fever    HEMATOLOGICAL:   - Monitor H&H, Keep active T&S  - Monitor Platelet count  - DVT prophylaxis: heparin    ENDOCRINE:   - Monitor FS  - Sliding scale, Basal/Bolus regimen  - Check Lipid panel, A1c    MUSCULOSKELETAL:   - Ambulate as tolerated     CODE STATUS: Full    DISPOSITION: SDU

## 2022-07-16 NOTE — CHART NOTE - NSCHARTNOTEFT_GEN_A_CORE
Patient was seen and examined at bedside by myself this AM.  PE:  Gen: NAD, comfortable  Neuro: AAOX3, no focal deficit  CVS: RRR, no m/r/g, no LE edema  Resp: CTAb/l, no w/r/r    Was notified that patient eloped.    A/P: patient was admitted earlier this AM, please see H&P for full A/P    #Transient blurring of vision - TIA vs stroke  #GUERDA  #h/o HTN  #h/o DM  #h/o Moderate AS  #Chronic thrombocytopenia  #?dementia  - Avoid CNS Depressants   - continue with Gabapentin, Memantine  - Neuro Checks q4h  - MRI brain  - Start ASA, statin  - Neuro f/u  - Oral care  - Aspiration precautions   - Passed bedside swallow eval  - Telemetry monitoring  - Repeat Echo

## 2022-07-16 NOTE — OCCUPATIONAL THERAPY INITIAL EVALUATION ADULT - ADDITIONAL COMMENTS
PLOF obtained from pt. +standard toilet with vanity next to push off of. CT brain 7/15 No evidence of acute intracranial hemorrhage or large territorial infarct. MRI pending

## 2022-07-16 NOTE — H&P ADULT - HISTORY OF PRESENT ILLNESS
82y  M with PMH of DM, HTN, BPH, Hepatic steatosis, chronic thrombocytopenia, moderate AS, presented to the ED with c/o blurring of vision on both eyes since he woke up this morning, he tried rubbing his eyes but the blurring didn't improve, he initially visited his PCP who advised him to come to the ED. The symptoms had resolved on arrival to the ED. He denies any chest pain, headache, nausea, vomiting, dizziness, vertigo, tinnitus, numbness, weakness, fever, eye pain, discharge, double vision.   In the ED he was code stroke, NIHSS was 0 on ED assessment.     ED vitals:   T(F): 98 (07-15 @ 20:07), Max: 98 (07-15 @ 20:07)  HR: 69 (07-15 @ 21:35) (69 - 104)  BP: 123/64 (07-15 @ 21:35) (109/58 - 128/58)  RR: 18 (07-15 @ 21:35) (18 - 18)  SpO2: 96% (07-15 @ 21:35) (96% - 100%)    ED workup:                         12.9   5.16  )-----------( 95       ( 15 Jul 2022 19:35 )             38.5     07-15    134<L>  |  98  |  34<H>  ----------------------------<  172<H>  4.8   |  23  |  1.7<H>    Ca    9.8      15 Jul 2022 19:35    TPro  7.5  /  Alb  4.6  /  TBili  0.4  /  DBili  x   /  AST  47<H>  /  ALT  43<H>  /  AlkPhos  65  07-15  LIVER FUNCTIONS - ( 15 Jul 2022 19:35 )  Alb: 4.6 g/dL / Pro: 7.5 g/dL / ALK PHOS: 65 U/L / ALT: 43 U/L / AST: 47 U/L / GGT: x             PT/INR - ( 15 Jul 2022 19:35 )   PT: 13.10 sec;   INR: 1.14 ratio       PTT - ( 15 Jul 2022 19:35 )  PTT:32.8 sec    CARDIAC MARKERS ( 15 Jul 2022 19:35 )  x     / <0.01 ng/mL / x     / x     / x        POCT Blood Glucose.: 194 mg/dL (15 Jul 2022 19:33)    CT head and CTA head and neck was done in the ED which was negative for any acute infarct/hemorrhage or large vessel occlusion but showed - Multifocal mild stenosis of left M1 segment, Focal mild stenoses of bilateral cervical internal carotids secondary to atherosclerosis, 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     In the ED he received Aspirin. Neuro was contacted by ED who recommended admission for q4h neurochecks.

## 2022-07-16 NOTE — OCCUPATIONAL THERAPY INITIAL EVALUATION ADULT - PERTINENT HX OF CURRENT PROBLEM, REHAB EVAL
82y  M with PMH of DM, HTN, BPH, Hepatic steatosis, chronic thrombocytopenia, moderate AS, presented to the ED with c/o blurring of vision on both eyes since he woke up this morning, he tried rubbing his eyes but the blurring didn't improve, he initially visited his PCP who advised him to come to the ED. The symptoms had resolved on arrival to the ED.

## 2022-07-16 NOTE — CHART NOTE - NSCHARTNOTEFT_GEN_A_CORE
Patient insisted on leaving against medical advice. He was fully oriented and aware of his situation and the risks for his AMA discharge. He did not wait for his paper work and he eloped.

## 2022-07-16 NOTE — H&P ADULT - NSHPPHYSICALEXAM_GEN_ALL_CORE
Vitals (Last 24 Hrs):  T(C): 36.7 (07-15-22 @ 20:07), Max: 36.7 (07-15-22 @ 20:07)  T(F): 98 (07-15-22 @ 20:07), Max: 98 (07-15-22 @ 20:07)  HR: 69 (07-15-22 @ 21:35) (69 - 104)  BP: 123/64 (07-15-22 @ 21:35) (109/58 - 128/58)  RR: 18 (07-15-22 @ 21:35) (18 - 18)  SpO2: 96% (07-15-22 @ 21:35) (96% - 100%) Vitals (Last 24 Hrs):  T(C): 36.7 (07-15-22 @ 20:07), Max: 36.7 (07-15-22 @ 20:07)  T(F): 98 (07-15-22 @ 20:07), Max: 98 (07-15-22 @ 20:07)  HR: 69 (07-15-22 @ 21:35) (69 - 104)  BP: 123/64 (07-15-22 @ 21:35) (109/58 - 128/58)  RR: 18 (07-15-22 @ 21:35) (18 - 18)  SpO2: 96% (07-15-22 @ 21:35) (96% - 100%)    General: No acute distress; Pallor (-), Icterus (-), Cyanosis (-), Clubbing (-)  HEENT: Normocephalic, atraumatic, PERRLA, EOMI  PULM: Bilaterally equal and clear breath sounds, wheeze (-), rubs (-), crackles (-)  CVS: Normal S1 and S2, murmurs (-), rubs (-), gallops (-)   GI: Soft, protuberant, nontender, BS +  MSK: Edema (-), no muscle, bone or joint tenderness noted  SKIN: Warm and well perfused, no rashes noted  NEURO:  Alert and Oriented x 3; No gross focal neurological deficit noted

## 2022-07-16 NOTE — PATIENT PROFILE ADULT - FALL HARM RISK - HARM RISK INTERVENTIONS

## 2022-07-16 NOTE — OCCUPATIONAL THERAPY INITIAL EVALUATION ADULT - LIVES WITH, PROFILE
with wife in a private home 0 steps to enter +11 steps with bilateral handrails to bedroom/bathroom +tub/spouse

## 2022-07-16 NOTE — PHYSICAL THERAPY INITIAL EVALUATION ADULT - GENERAL OBSERVATIONS, REHAB EVAL
10:00-10:25 Chart reviewed. Pt encountered sitting in chair, may be seen by Physical Therapist as confirmed with Nurse. Patient denied pain and ready to walk now; +tel; +IV LUE

## 2022-07-21 DIAGNOSIS — Z79.84 LONG TERM (CURRENT) USE OF ORAL HYPOGLYCEMIC DRUGS: ICD-10-CM

## 2022-07-21 DIAGNOSIS — H53.8 OTHER VISUAL DISTURBANCES: ICD-10-CM

## 2022-07-21 DIAGNOSIS — Z87.891 PERSONAL HISTORY OF NICOTINE DEPENDENCE: ICD-10-CM

## 2022-07-21 DIAGNOSIS — E11.9 TYPE 2 DIABETES MELLITUS WITHOUT COMPLICATIONS: ICD-10-CM

## 2022-07-21 DIAGNOSIS — N17.9 ACUTE KIDNEY FAILURE, UNSPECIFIED: ICD-10-CM

## 2022-07-21 DIAGNOSIS — F03.90 UNSPECIFIED DEMENTIA WITHOUT BEHAVIORAL DISTURBANCE: ICD-10-CM

## 2022-07-21 DIAGNOSIS — G45.9 TRANSIENT CEREBRAL ISCHEMIC ATTACK, UNSPECIFIED: ICD-10-CM

## 2022-07-21 DIAGNOSIS — K76.0 FATTY (CHANGE OF) LIVER, NOT ELSEWHERE CLASSIFIED: ICD-10-CM

## 2022-07-21 DIAGNOSIS — R29.700 NIHSS SCORE 0: ICD-10-CM

## 2022-07-21 DIAGNOSIS — I63.9 CEREBRAL INFARCTION, UNSPECIFIED: ICD-10-CM

## 2022-07-21 DIAGNOSIS — I10 ESSENTIAL (PRIMARY) HYPERTENSION: ICD-10-CM

## 2022-07-21 DIAGNOSIS — N40.0 BENIGN PROSTATIC HYPERPLASIA WITHOUT LOWER URINARY TRACT SYMPTOMS: ICD-10-CM

## 2022-07-21 DIAGNOSIS — I35.0 NONRHEUMATIC AORTIC (VALVE) STENOSIS: ICD-10-CM

## 2022-07-21 DIAGNOSIS — D69.6 THROMBOCYTOPENIA, UNSPECIFIED: ICD-10-CM

## 2022-12-02 ENCOUNTER — OUTPATIENT (OUTPATIENT)
Dept: OUTPATIENT SERVICES | Facility: HOSPITAL | Age: 82
LOS: 1 days | Discharge: HOME | End: 2022-12-02

## 2022-12-02 DIAGNOSIS — Z90.49 ACQUIRED ABSENCE OF OTHER SPECIFIED PARTS OF DIGESTIVE TRACT: Chronic | ICD-10-CM

## 2022-12-02 DIAGNOSIS — M54.59 OTHER LOW BACK PAIN: ICD-10-CM

## 2022-12-02 PROBLEM — Z86.19 PERSONAL HISTORY OF OTHER INFECTIOUS AND PARASITIC DISEASES: Chronic | Status: ACTIVE | Noted: 2022-07-16

## 2022-12-02 PROBLEM — B54 UNSPECIFIED MALARIA: Chronic | Status: ACTIVE | Noted: 2022-07-16

## 2022-12-02 PROBLEM — N40.0 BENIGN PROSTATIC HYPERPLASIA WITHOUT LOWER URINARY TRACT SYMPTOMS: Chronic | Status: ACTIVE | Noted: 2022-07-15

## 2022-12-02 PROBLEM — E11.9 TYPE 2 DIABETES MELLITUS WITHOUT COMPLICATIONS: Chronic | Status: ACTIVE | Noted: 2022-07-15

## 2022-12-02 PROBLEM — I10 ESSENTIAL (PRIMARY) HYPERTENSION: Chronic | Status: ACTIVE | Noted: 2022-07-15

## 2022-12-02 PROBLEM — B65.9: Chronic | Status: ACTIVE | Noted: 2022-07-16

## 2022-12-02 PROCEDURE — 72110 X-RAY EXAM L-2 SPINE 4/>VWS: CPT | Mod: 26

## 2022-12-02 PROCEDURE — 72220 X-RAY EXAM SACRUM TAILBONE: CPT | Mod: 26

## 2023-03-17 ENCOUNTER — APPOINTMENT (OUTPATIENT)
Dept: CARDIOLOGY | Facility: CLINIC | Age: 83
End: 2023-03-17
Payer: MEDICARE

## 2023-03-17 VITALS
WEIGHT: 222 LBS | HEIGHT: 67 IN | OXYGEN SATURATION: 94 % | TEMPERATURE: 98.1 F | DIASTOLIC BLOOD PRESSURE: 76 MMHG | SYSTOLIC BLOOD PRESSURE: 140 MMHG | BODY MASS INDEX: 34.84 KG/M2 | HEART RATE: 81 BPM

## 2023-03-17 DIAGNOSIS — D69.6 THROMBOCYTOPENIA, UNSPECIFIED: ICD-10-CM

## 2023-03-17 PROCEDURE — 93000 ELECTROCARDIOGRAM COMPLETE: CPT

## 2023-03-17 PROCEDURE — 99214 OFFICE O/P EST MOD 30 MIN: CPT | Mod: 25

## 2023-03-17 RX ORDER — METFORMIN HYDROCHLORIDE 500 MG/1
500 TABLET, COATED ORAL
Qty: 60 | Refills: 0 | Status: DISCONTINUED | COMMUNITY
Start: 2019-06-26 | End: 2023-03-17

## 2023-03-17 RX ORDER — METFORMIN HYDROCHLORIDE 1000 MG/1
1000 TABLET, COATED ORAL
Qty: 180 | Refills: 0 | Status: DISCONTINUED | COMMUNITY
Start: 2020-08-10 | End: 2023-03-17

## 2023-03-17 RX ORDER — GABAPENTIN 300 MG/1
300 CAPSULE ORAL
Qty: 180 | Refills: 0 | Status: DISCONTINUED | COMMUNITY
Start: 2018-11-29 | End: 2023-03-17

## 2023-03-17 RX ORDER — LISINOPRIL 5 MG/1
5 TABLET ORAL
Qty: 90 | Refills: 0 | Status: DISCONTINUED | COMMUNITY
Start: 2020-08-10 | End: 2023-03-17

## 2023-03-17 RX ORDER — OMEPRAZOLE 40 MG/1
40 CAPSULE, DELAYED RELEASE ORAL
Qty: 60 | Refills: 0 | Status: DISCONTINUED | COMMUNITY
Start: 2019-02-06 | End: 2023-03-17

## 2023-03-17 RX ORDER — NATEGLINIDE 120 MG/1
120 TABLET, COATED ORAL
Qty: 270 | Refills: 0 | Status: DISCONTINUED | COMMUNITY
Start: 2019-02-06 | End: 2023-03-17

## 2023-03-17 RX ORDER — MONTELUKAST 10 MG/1
10 TABLET, FILM COATED ORAL
Qty: 30 | Refills: 0 | Status: DISCONTINUED | COMMUNITY
Start: 2019-06-20 | End: 2023-03-17

## 2023-03-17 RX ORDER — LEVOTHYROXINE SODIUM 0.03 MG/1
25 TABLET ORAL
Qty: 30 | Refills: 0 | Status: DISCONTINUED | COMMUNITY
Start: 2019-06-20 | End: 2023-03-17

## 2023-03-17 NOTE — HISTORY OF PRESENT ILLNESS
[FreeTextEntry1] : Patient with history of malaria, Schistosomiasis, hepatitis treated in [place] and contracted while residing in [place].\par HTN\par Heart murmur aortic stenosis\par Former cigarette smoker having quit 12 years ago\par Familial history older brother had coronary stenting vessel unknown by the patient\par PsurgHx: Appendectomy at 6 yrs of age.\par His GI physician is DR. Mckenna

## 2023-03-17 NOTE — REVIEW OF SYSTEMS
[Dyspnea on exertion] : dyspnea during exertion [Negative] : Heme/Lymph [Chest Discomfort] : chest discomfort

## 2023-03-17 NOTE — PHYSICAL EXAM
[General Appearance - Well Developed] : well developed [Normal Appearance] : normal appearance [General Appearance - In No Acute Distress] : no acute distress [Normal Conjunctiva] : the conjunctiva exhibited no abnormalities [Normal Oropharynx] : normal oropharynx [Normal Jugular Venous V Waves Present] : normal jugular venous V waves present [Auscultation Breath Sounds / Voice Sounds] : lungs were clear to auscultation bilaterally [Heart Rate And Rhythm] : heart rate and rhythm were normal [Heart Sounds] : normal S1 and S2 [Arterial Pulses Normal] : the arterial pulses were normal [Edema] : no peripheral edema present [Abdomen Soft] : soft [Abnormal Walk] : normal gait [Skin Turgor] : normal skin turgor [] : no rash [No Venous Stasis] : no venous stasis [Impaired Insight] : insight and judgment were intact [FreeTextEntry1] : Grade III/VI MARIELOS over aortic area

## 2023-03-17 NOTE — ASSESSMENT
[FreeTextEntry1] : Aortic stenosis moderate on prior TTE\par R/O ASHD\par DM\par Hyperlipidemia\par Elevated LFT's \par Liver cirrhosis\par Thrombocytopenia\par Leukopenia

## 2023-03-17 NOTE — DISCUSSION/SUMMARY
[FreeTextEntry1] : IV adenosine thallium will be reordered. \par 2D echo doppler to exclude progression of his AS.\par Patient was instructed to target his T. Cholesterol to less than 200 mg/dl and LDL cholesterol to less than 100 mg/dl.\par EKG: NSR, rate of 81 bpm.\par Patient is seeing GI and will be having a workup of his liver \par Maintain present medications. \par Patient was advised to repeat a BMP, CBC , fasting lipid profile and hepatic panel.\par RV in 2 weeks.

## 2023-03-31 ENCOUNTER — APPOINTMENT (OUTPATIENT)
Dept: CV DIAGNOSTICS | Facility: HOSPITAL | Age: 83
End: 2023-03-31
Payer: MEDICARE

## 2023-03-31 ENCOUNTER — OUTPATIENT (OUTPATIENT)
Dept: OUTPATIENT SERVICES | Facility: HOSPITAL | Age: 83
LOS: 1 days | End: 2023-03-31
Payer: MEDICARE

## 2023-03-31 DIAGNOSIS — Z90.49 ACQUIRED ABSENCE OF OTHER SPECIFIED PARTS OF DIGESTIVE TRACT: Chronic | ICD-10-CM

## 2023-03-31 DIAGNOSIS — R07.9 CHEST PAIN, UNSPECIFIED: ICD-10-CM

## 2023-03-31 PROCEDURE — 78452 HT MUSCLE IMAGE SPECT MULT: CPT | Mod: 26,ME

## 2023-03-31 PROCEDURE — 93016 CV STRESS TEST SUPVJ ONLY: CPT

## 2023-03-31 PROCEDURE — G1004: CPT

## 2023-03-31 PROCEDURE — 93018 CV STRESS TEST I&R ONLY: CPT

## 2023-03-31 PROCEDURE — A9500: CPT

## 2023-03-31 PROCEDURE — 78452 HT MUSCLE IMAGE SPECT MULT: CPT | Mod: ME

## 2023-03-31 PROCEDURE — 93017 CV STRESS TEST TRACING ONLY: CPT

## 2023-03-31 RX ORDER — ADENOSINE 3 MG/ML
60 INJECTION INTRAVENOUS ONCE
Refills: 0 | Status: DISCONTINUED | OUTPATIENT
Start: 2023-03-31 | End: 2023-04-14

## 2023-04-01 DIAGNOSIS — R07.9 CHEST PAIN, UNSPECIFIED: ICD-10-CM

## 2023-04-14 ENCOUNTER — APPOINTMENT (OUTPATIENT)
Dept: CARDIOLOGY | Facility: CLINIC | Age: 83
End: 2023-04-14
Payer: MEDICARE

## 2023-04-14 PROCEDURE — 93306 TTE W/DOPPLER COMPLETE: CPT

## 2023-04-24 ENCOUNTER — APPOINTMENT (OUTPATIENT)
Dept: CARDIOLOGY | Facility: CLINIC | Age: 83
End: 2023-04-24
Payer: MEDICARE

## 2023-04-24 VITALS
HEIGHT: 67 IN | SYSTOLIC BLOOD PRESSURE: 140 MMHG | DIASTOLIC BLOOD PRESSURE: 78 MMHG | BODY MASS INDEX: 34.84 KG/M2 | WEIGHT: 222 LBS

## 2023-04-24 PROCEDURE — 99214 OFFICE O/P EST MOD 30 MIN: CPT

## 2023-04-24 NOTE — ASSESSMENT
[FreeTextEntry1] : Aortic stenosis moderate on recent TTE\par Normal adenosine thallium stress nuclear scan\par DM\par Hyperlipidemia\par Elevated LFT's \par Liver cirrhosis\par Thrombocytopenia\par Leukopenia

## 2023-04-24 NOTE — REVIEW OF SYSTEMS
No [Dyspnea on exertion] : dyspnea during exertion [Chest Discomfort] : chest discomfort [Negative] : Heme/Lymph

## 2023-04-24 NOTE — DISCUSSION/SUMMARY
[FreeTextEntry1] : IV adenosine thallium was negative for myocardial ischemia\par 2D echo doppler results were also reviewed with the patient.\par Copies of the results were provided to him.\par Patient was instructed to target his T. Cholesterol to less than 200 mg/dl and LDL cholesterol to less than 100 mg/dl.\par Patient is seeing GI and will be having a workup of his liver \par Maintain present medications. \par Patient was advised to repeat a BMP, CBC , fasting lipid profile and hepatic panel.\par RV in 1/24

## 2023-09-21 NOTE — ED PROVIDER NOTE - OBJECTIVE STATEMENT
When Was Your Last Botox Treatment?: 07/2023 82 year old male with pmhx of htn and dm presents with bilateral lower extremity pain x few days. pt admits while hes sleeping, has muscle spasms and has restless leg. Pt denies trauma or injury, swelling, redness, chest pain, sob, fever, chills, or urinary symptoms. no back pain

## 2023-10-30 ENCOUNTER — APPOINTMENT (OUTPATIENT)
Dept: CARDIOLOGY | Facility: CLINIC | Age: 83
End: 2023-10-30
Payer: MEDICARE

## 2023-10-30 VITALS
SYSTOLIC BLOOD PRESSURE: 128 MMHG | HEIGHT: 67 IN | WEIGHT: 214 LBS | HEART RATE: 89 BPM | BODY MASS INDEX: 33.59 KG/M2 | DIASTOLIC BLOOD PRESSURE: 80 MMHG

## 2023-10-30 DIAGNOSIS — R07.89 OTHER CHEST PAIN: ICD-10-CM

## 2023-10-30 PROCEDURE — 99214 OFFICE O/P EST MOD 30 MIN: CPT

## 2023-10-30 PROCEDURE — 93000 ELECTROCARDIOGRAM COMPLETE: CPT

## 2023-10-30 RX ORDER — ISOSORBIDE MONONITRATE 30 MG/1
30 TABLET, EXTENDED RELEASE ORAL
Qty: 90 | Refills: 3 | Status: ACTIVE | COMMUNITY
Start: 2023-10-30 | End: 1900-01-01

## 2023-12-11 NOTE — PHYSICAL THERAPY INITIAL EVALUATION ADULT - TRANSFER SKILLS, REHAB EVAL
[facility]   Cardiology Admission Note              Date:   12/11/2023  Patient name: Monster Colin  Date of admission:  12/11/2023  7:10 AM  MRN:   [id number]  YOB: 1940    Primary Care physician: Marci Alcaraz MD    Reason for Admission:  atrial fibrillation    CHIEF COMPLAINT:  Symptomatic atrial fibrillation     History Obtained From:  patient    HISTORY OF PRESENT ILLNESS:    Mrs. Rodríguez is a pleasant 81year old female with a medical history significant for paroxysmal atrial fibrillation, non-obstructive coronary artery disease, history of non-ischemic cardiomyopathy, and hypothyroidism who presents from home for ablation and dofetilide admission. She has been compliant with her [address] Past Medical History:   has a past medical history of Atrial fibrillation ([address]), Cardiomyopathy ([address]), COVID-19, Hypertension, Thyroid disease, and Urethral stricture. Past Surgical History:   has a past surgical history that includes Hemorrhoid surgery; Tonsillectomy; Knee arthroscopy (Right, 03/10/2016); Cardioversion (03/16/2022); and Hysterectomy. Home Medications:    Prior to Admission medications    Medication Sig Start Date End Date Taking?  Authorizing Provider   tiZANidine (ZANAFLEX) 2 MG tablet Take 1 tablet by mouth as needed  Patient not taking: Reported on 11/7/2023 10/20/23   Mini Norris MD   metoprolol succinate (TOPROL XL) 100 MG extended release tablet Take 1 tablet by mouth in the morning and at bedtime  Patient taking differently: Take 1 tablet by mouth 2 times daily 11/1/23   Oli Garcia DO   ELIQUIS 5 MG TABS tablet TAKE 1 TABLET BY MOUTH TWICE DAILY 10/9/23   Sky Anguiano MD   losartan (COZAAR) 50 MG tablet  4/14/23   Mini Norris MD   pantoprazole (PROTONIX) 40 MG tablet Take 1 tablet by mouth daily    Mini Norris MD   losartan (COZAAR) 25 MG tablet Take 1 tablet by mouth daily 1/10/23   Sky Anguiano MD   aspirin 81 independent

## 2024-03-25 ENCOUNTER — APPOINTMENT (OUTPATIENT)
Dept: CARDIOLOGY | Facility: CLINIC | Age: 84
End: 2024-03-25

## 2024-04-08 ENCOUNTER — APPOINTMENT (OUTPATIENT)
Dept: CARDIOLOGY | Facility: CLINIC | Age: 84
End: 2024-04-08
Payer: MEDICARE

## 2024-04-08 VITALS
SYSTOLIC BLOOD PRESSURE: 110 MMHG | BODY MASS INDEX: 40.02 KG/M2 | DIASTOLIC BLOOD PRESSURE: 64 MMHG | WEIGHT: 212 LBS | HEART RATE: 79 BPM | HEIGHT: 61 IN

## 2024-04-08 DIAGNOSIS — E78.5 HYPERLIPIDEMIA, UNSPECIFIED: ICD-10-CM

## 2024-04-08 DIAGNOSIS — E11.9 TYPE 2 DIABETES MELLITUS W/OUT COMPLICATIONS: ICD-10-CM

## 2024-04-08 DIAGNOSIS — I35.0 NONRHEUMATIC AORTIC (VALVE) STENOSIS: ICD-10-CM

## 2024-04-08 DIAGNOSIS — I10 ESSENTIAL (PRIMARY) HYPERTENSION: ICD-10-CM

## 2024-04-08 PROCEDURE — 99214 OFFICE O/P EST MOD 30 MIN: CPT

## 2024-04-08 PROCEDURE — 93000 ELECTROCARDIOGRAM COMPLETE: CPT

## 2024-04-08 RX ORDER — EZETIMIBE 10 MG/1
10 TABLET ORAL
Qty: 90 | Refills: 3 | Status: ACTIVE | COMMUNITY
Start: 2024-04-08 | End: 1900-01-01

## 2024-04-08 RX ORDER — ISOSORBIDE MONONITRATE 30 MG/1
30 TABLET, EXTENDED RELEASE ORAL
Qty: 90 | Refills: 3 | Status: ACTIVE | COMMUNITY
Start: 2024-04-08 | End: 1900-01-01

## 2024-04-08 NOTE — DISCUSSION/SUMMARY
[FreeTextEntry1] : IV adenosine thallium was negative for myocardial ischemia 2D echo doppler results were reviewed with the patient on his prior office visit. Copies of the results were provided to him on his prior office visit. EKG: NSR, rate of 79 bpm, normal findings. Patient was instructed to target his T. Cholesterol to less than 200 mg/dl and LDL cholesterol to less than 100 mg/dl. Patient is seeing GI and will be having a workup of his liver  Maintain present medications. Will renew isosorbide. Patient was advised to repeat a BMP, CBC , fasting lipid profile and hepatic panel. RV in 6 months. [EKG obtained to assist in diagnosis and management of assessed problem(s)] : EKG obtained to assist in diagnosis and management of assessed problem(s)

## 2024-04-08 NOTE — REASON FOR VISIT
[FreeTextEntry1] : Patient presents for an over due follow up visit.  He has a known history of moderate aortic stenosis

## 2024-04-08 NOTE — HISTORY OF PRESENT ILLNESS
[FreeTextEntry1] : Patient with history of malaria, Schistosomiasis, hepatitis treated in [place] and contracted while residing in [facility]. HTN Heart murmur -aortic stenosis Former cigarette smoker having quit 12 years ago Familial history older brother had coronary stenting vessel unknown by the patient PsurgHx: Appendectomy at 6 yrs of age. His GI physician is DR. Mckenna

## 2024-04-08 NOTE — ASSESSMENT
[FreeTextEntry1] : Aortic stenosis moderate on recent TTE  Normal adenosine thallium stress nuclear scan  DM  Hyperlipidemia  Elevated LFT's  Liver cirrhosis  Thrombocytopenia  Leukopenia.

## 2024-06-18 ENCOUNTER — EMERGENCY (EMERGENCY)
Facility: HOSPITAL | Age: 84
LOS: 0 days | Discharge: HOME | End: 2024-06-18
Attending: EMERGENCY MEDICINE
Payer: MEDICARE

## 2024-06-18 VITALS
RESPIRATION RATE: 18 BRPM | SYSTOLIC BLOOD PRESSURE: 152 MMHG | WEIGHT: 210.1 LBS | DIASTOLIC BLOOD PRESSURE: 73 MMHG | HEART RATE: 78 BPM | OXYGEN SATURATION: 96 % | TEMPERATURE: 99 F

## 2024-06-18 DIAGNOSIS — N40.0 BENIGN PROSTATIC HYPERPLASIA WITHOUT LOWER URINARY TRACT SYMPTOMS: ICD-10-CM

## 2024-06-18 DIAGNOSIS — I10 ESSENTIAL (PRIMARY) HYPERTENSION: ICD-10-CM

## 2024-06-18 DIAGNOSIS — L76.22 POSTPROCEDURAL HEMORRHAGE OF SKIN AND SUBCUTANEOUS TISSUE FOLLOWING OTHER PROCEDURE: ICD-10-CM

## 2024-06-18 DIAGNOSIS — E11.9 TYPE 2 DIABETES MELLITUS WITHOUT COMPLICATIONS: ICD-10-CM

## 2024-06-18 DIAGNOSIS — Z90.49 ACQUIRED ABSENCE OF OTHER SPECIFIED PARTS OF DIGESTIVE TRACT: Chronic | ICD-10-CM

## 2024-06-18 PROCEDURE — 99282 EMERGENCY DEPT VISIT SF MDM: CPT

## 2024-06-18 PROCEDURE — 99283 EMERGENCY DEPT VISIT LOW MDM: CPT

## 2024-06-18 NOTE — ED PROVIDER NOTE - PATIENT PORTAL LINK FT
You can access the FollowMyHealth Patient Portal offered by [facility] by registering at the following website: [email or web address]. By joining [facility]’s FollowMyHealth portal, you will also be able to view your health information using other applications (apps) compatible with our system.

## 2024-06-18 NOTE — ED PROVIDER NOTE - OBJECTIVE STATEMENT
84-year-old male past medical history diabetes, hypertension, BPH, inferior eyelid excision on Monday presents for wound check.  Patient Dors is he had a small amount of bleeding from the surgical site today and is going on a trip tomorrow so wanted to have the wound checked.  Denies fever, discharge, swelling, redness, headache, vision change.

## 2024-06-18 NOTE — ED ADULT NURSE NOTE - NSFALLHARMRISKINTERV_ED_ALL_ED

## 2024-06-18 NOTE — ED PROVIDER NOTE - PHYSICAL EXAMINATION
CONST: Well appearing in NAD  EYES: PERRL, EOMI, Sclera and conjunctiva clear.   ENT: No nasal discharge. Oropharynx normal appearing  SKIN: surgical site c/d/i

## 2024-06-18 NOTE — ED PROVIDER NOTE - NSFOLLOWUPCLINICS_GEN_ALL_ED_FT
[facility]  Ophthalmolgy  [address], [address]  [place] [address]  Phone: [phone]  Fax:

## 2024-06-18 NOTE — ED ADULT TRIAGE NOTE - CHIEF COMPLAINT QUOTE
pt had surgery on his right eye yesterday and is c/o bleeding from the site that has since resolved.

## 2024-06-18 NOTE — ED PROVIDER NOTE - NSFOLLOWUPINSTRUCTIONS_ED_ALL_ED_FT
Follow up with PMD and Ophthalmology in 1-2 days.    Wound Care    Taking care of your wound properly can help to prevent pain and infection. It can also help your wound to heal more quickly.     HOW TO CARE FOR YOUR WOUND   Take or apply over-the-counter and prescription medicines only as told by your health care provider.  If you were prescribed antibiotic medicine, take or apply it as told by your health care provider. Do not stop using the antibiotic even if your condition improves.  Clean the wound each day or as told by your health care provider.  Wash the wound with mild soap and water.  Rinse the wound with water to remove all soap.  Pat the wound dry with a clean towel. Do not rub it.  There are many different ways to close and cover a wound. For example, a wound can be covered with stitches (sutures), skin glue, or adhesive strips. Follow instructions from your health care provider about:  How to take care of your wound.  When and how you should change your bandage (dressing).  When you should remove your dressing.  Removing whatever was used to close your wound.  Check your wound every day for signs of infection. Watch for:  Redness, swelling, or pain.  Fluid, blood, or pus.  Keep the dressing dry until your health care provider says it can be removed. Do not take baths, swim, use a hot tub, or do anything that would put your wound underwater until your health care provider approves.  Raise (elevate) the injured area above the level of your heart while you are sitting or lying down.  Do not scratch or pick at the wound.  Keep all follow-up visits as told by your health care provider. This is important.    SEEK MEDICAL CARE IF:  You received a tetanus shot and you have swelling, severe pain, redness, or bleeding at the injection site.  You have a fever.  Your pain is not controlled with medicine.  You have increased redness, swelling, or pain at the site of your wound.  You have fluid, blood, or pus coming from your wound.  You notice a bad smell coming from your wound or your dressing.    SEEK IMMEDIATE MEDICAL CARE IF:  You have a red streak going away from your wound.

## 2024-06-18 NOTE — ED ADULT NURSE NOTE - NS ED NURSE DC INFO COMPLEXITY
73 yo male no PMHx (only on aspirin 81 mg) who presents to the ED with worsening shortness of breath after testing positive for a known COVID-19 infection. Patient was tested positive for COVID-19 about 10 days previously. Since then, she has had some fatigue and decreased appetite. However, over the last three days, she has had increased dyspnea and dyspnea on exertion. While in the ED, she destaturated and required 3 L of oxygen. Patient was weened down to 1L of oxygen, but still is 91% on room air.
Verbalized Understanding

## 2024-06-18 NOTE — ED PROVIDER NOTE - CLINICAL SUMMARY MEDICAL DECISION MAKING FREE TEXT BOX
83 yo M, hx as noted, 1 day sp entropion repair here for assessment of bleeding from wound site after taking a shower. Bleeding stopped with pressure.     Currently has no bleeding, pain, change in vision.    At this time no indication for intervention, lab, imaging at this time.     Will dc home with continued optho follow up, instructions to follow post up follow up plan.

## 2024-09-09 ENCOUNTER — APPOINTMENT (OUTPATIENT)
Dept: CARDIOLOGY | Facility: CLINIC | Age: 84
End: 2024-09-09
Payer: MEDICARE

## 2024-09-09 VITALS
HEIGHT: 61 IN | BODY MASS INDEX: 41.54 KG/M2 | HEART RATE: 75 BPM | SYSTOLIC BLOOD PRESSURE: 118 MMHG | WEIGHT: 220 LBS | DIASTOLIC BLOOD PRESSURE: 62 MMHG

## 2024-09-09 DIAGNOSIS — E78.5 HYPERLIPIDEMIA, UNSPECIFIED: ICD-10-CM

## 2024-09-09 DIAGNOSIS — I10 ESSENTIAL (PRIMARY) HYPERTENSION: ICD-10-CM

## 2024-09-09 DIAGNOSIS — E11.9 TYPE 2 DIABETES MELLITUS W/OUT COMPLICATIONS: ICD-10-CM

## 2024-09-09 DIAGNOSIS — I35.0 NONRHEUMATIC AORTIC (VALVE) STENOSIS: ICD-10-CM

## 2024-09-09 PROCEDURE — 93000 ELECTROCARDIOGRAM COMPLETE: CPT

## 2024-09-09 PROCEDURE — 99214 OFFICE O/P EST MOD 30 MIN: CPT

## 2024-09-09 NOTE — ASSESSMENT
[FreeTextEntry1] : Aortic stenosis moderate on recent TTE  Normal adenosine stress nuclear scan  DM  Hyperlipidemia  Elevated LFT's  Liver cirrhosis  Thrombocytopenia  Leukopenia.

## 2024-09-09 NOTE — DISCUSSION/SUMMARY
[FreeTextEntry1] : IV adenosine thallium was negative for myocardial ischemia 2D echo doppler results were reviewed with the patient on his prior office visit. Copies of the results were provided to him on his prior office visit. EKG: NSR, rate of 75 bpm, normal findings. Patient was instructed to target his T. Cholesterol to less than 200 mg/dl and LDL cholesterol to less than 100 mg/dl. Patient is seeing GI and will be having a workup of his liver  Maintain present medications. Will renew isosorbide. Patient was advised to repeat a BMP, CBC , fasting lipid profile and hepatic panel. RV in 6 months. [EKG obtained to assist in diagnosis and management of assessed problem(s)] : EKG obtained to assist in diagnosis and management of assessed problem(s)

## 2024-09-09 NOTE — PHYSICAL EXAM
[General Appearance - Well Developed] : well developed [Normal Appearance] : normal appearance [General Appearance - In No Acute Distress] : no acute distress [Normal Conjunctiva] : the conjunctiva exhibited no abnormalities [Normal Oropharynx] : normal oropharynx [Normal Jugular Venous V Waves Present] : normal jugular venous V waves present [Auscultation Breath Sounds / Voice Sounds] : lungs were clear to auscultation bilaterally [Heart Rate And Rhythm] : heart rate and rhythm were normal [Arterial Pulses Normal] : the arterial pulses were normal [Heart Sounds] : normal S1 and S2 [Edema] : no peripheral edema present [Abdomen Soft] : soft [Abnormal Walk] : normal gait [Skin Turgor] : normal skin turgor [] : no rash [No Venous Stasis] : no venous stasis [Impaired Insight] : insight and judgment were intact [FreeTextEntry1] : Grade III/VI MARIELOS over aortic area

## 2024-09-09 NOTE — HISTORY OF PRESENT ILLNESS
[FreeTextEntry1] : Patient with history of malaria, Schistosomiasis, hepatitis treated in [place] and contracted while residing in [facility]. HTN Heart murmur -aortic stenosis Former cigarette smoker having quit 12 years ago Familial history older brother had coronary stenting vessel unknown by the patient PsurgHx: Appendectomy at 6 yrs of age. His GI physician is DR. Mckenna

## 2024-09-09 NOTE — REASON FOR VISIT
[FreeTextEntry1] : Patient presents for a follow up Cardiology visit.  He was recently hospitalized for left shoulder and arm pain. His EKG's, CXR, and hospital visit was unremarkable. He has a known history of moderate aortic stenosis

## 2024-10-02 NOTE — ED ADULT NURSE NOTE - NSFALLRISKASMTTYPE_ED_ALL_ED
How Severe Is Your Skin Lesion?: moderate
Has Your Skin Lesion Been Treated?: not been treated
Is This A New Presentation, Or A Follow-Up?: Growth
Initial (On Arrival)

## 2024-10-07 ENCOUNTER — APPOINTMENT (OUTPATIENT)
Dept: CARDIOLOGY | Facility: CLINIC | Age: 84
End: 2024-10-07

## 2024-10-31 ENCOUNTER — APPOINTMENT (OUTPATIENT)
Age: 84
End: 2024-10-31

## 2024-11-12 ENCOUNTER — APPOINTMENT (OUTPATIENT)
Age: 84
End: 2024-11-12

## 2024-11-12 ENCOUNTER — OUTPATIENT (OUTPATIENT)
Dept: OUTPATIENT SERVICES | Facility: HOSPITAL | Age: 84
LOS: 1 days | End: 2024-11-12
Payer: MEDICARE

## 2024-11-12 ENCOUNTER — APPOINTMENT (OUTPATIENT)
Age: 84
End: 2024-11-12
Payer: MEDICARE

## 2024-11-12 VITALS
DIASTOLIC BLOOD PRESSURE: 76 MMHG | TEMPERATURE: 98.2 F | SYSTOLIC BLOOD PRESSURE: 147 MMHG | HEIGHT: 68.5 IN | HEART RATE: 78 BPM | RESPIRATION RATE: 16 BRPM | OXYGEN SATURATION: 96 % | WEIGHT: 219 LBS | BODY MASS INDEX: 32.81 KG/M2

## 2024-11-12 DIAGNOSIS — Z90.49 ACQUIRED ABSENCE OF OTHER SPECIFIED PARTS OF DIGESTIVE TRACT: Chronic | ICD-10-CM

## 2024-11-12 DIAGNOSIS — D69.6 THROMBOCYTOPENIA, UNSPECIFIED: ICD-10-CM

## 2024-11-12 DIAGNOSIS — K76.0 FATTY (CHANGE OF) LIVER, NOT ELSEWHERE CLASSIFIED: ICD-10-CM

## 2024-11-12 PROCEDURE — 99205 OFFICE O/P NEW HI 60 MIN: CPT

## 2024-11-13 DIAGNOSIS — D69.6 THROMBOCYTOPENIA, UNSPECIFIED: ICD-10-CM

## 2024-12-09 ENCOUNTER — EMERGENCY (EMERGENCY)
Facility: HOSPITAL | Age: 84
LOS: 0 days | Discharge: HOME | End: 2024-12-10
Attending: STUDENT IN AN ORGANIZED HEALTH CARE EDUCATION/TRAINING PROGRAM
Payer: MEDICARE

## 2024-12-09 VITALS
HEART RATE: 80 BPM | RESPIRATION RATE: 18 BRPM | OXYGEN SATURATION: 96 % | TEMPERATURE: 98 F | SYSTOLIC BLOOD PRESSURE: 114 MMHG | DIASTOLIC BLOOD PRESSURE: 62 MMHG

## 2024-12-09 VITALS
TEMPERATURE: 99 F | OXYGEN SATURATION: 96 % | SYSTOLIC BLOOD PRESSURE: 108 MMHG | HEART RATE: 85 BPM | RESPIRATION RATE: 18 BRPM | DIASTOLIC BLOOD PRESSURE: 68 MMHG

## 2024-12-09 DIAGNOSIS — Z90.49 ACQUIRED ABSENCE OF OTHER SPECIFIED PARTS OF DIGESTIVE TRACT: Chronic | ICD-10-CM

## 2024-12-09 LAB
ALBUMIN SERPL ELPH-MCNC: 3.4 G/DL — LOW (ref 3.5–5.2)
ALP SERPL-CCNC: 108 U/L — SIGNIFICANT CHANGE UP (ref 30–115)
ALT FLD-CCNC: 52 U/L — HIGH (ref 0–41)
AMMONIA BLD-MCNC: 16 UMOL/L — SIGNIFICANT CHANGE UP (ref 11–55)
ANION GAP SERPL CALC-SCNC: 12 MMOL/L — SIGNIFICANT CHANGE UP (ref 7–14)
APPEARANCE UR: CLEAR — SIGNIFICANT CHANGE UP
AST SERPL-CCNC: 42 U/L — HIGH (ref 0–41)
BASOPHILS # BLD AUTO: 0.03 K/UL — SIGNIFICANT CHANGE UP (ref 0–0.2)
BASOPHILS NFR BLD AUTO: 0.4 % — SIGNIFICANT CHANGE UP (ref 0–1)
BILIRUB DIRECT SERPL-MCNC: 0.3 MG/DL — SIGNIFICANT CHANGE UP (ref 0–0.3)
BILIRUB INDIRECT FLD-MCNC: 0.3 MG/DL — SIGNIFICANT CHANGE UP (ref 0.2–1.2)
BILIRUB SERPL-MCNC: 0.6 MG/DL — SIGNIFICANT CHANGE UP (ref 0.2–1.2)
BILIRUB UR-MCNC: NEGATIVE — SIGNIFICANT CHANGE UP
BUN SERPL-MCNC: 17 MG/DL — SIGNIFICANT CHANGE UP (ref 10–20)
CALCIUM SERPL-MCNC: 8.6 MG/DL — SIGNIFICANT CHANGE UP (ref 8.4–10.4)
CHLORIDE SERPL-SCNC: 97 MMOL/L — LOW (ref 98–110)
CO2 SERPL-SCNC: 22 MMOL/L — SIGNIFICANT CHANGE UP (ref 17–32)
COLOR SPEC: YELLOW — SIGNIFICANT CHANGE UP
CREAT SERPL-MCNC: 1 MG/DL — SIGNIFICANT CHANGE UP (ref 0.7–1.5)
DIFF PNL FLD: NEGATIVE — SIGNIFICANT CHANGE UP
EGFR: 75 ML/MIN/1.73M2 — SIGNIFICANT CHANGE UP
EOSINOPHIL # BLD AUTO: 0.06 K/UL — SIGNIFICANT CHANGE UP (ref 0–0.7)
EOSINOPHIL NFR BLD AUTO: 0.9 % — SIGNIFICANT CHANGE UP (ref 0–8)
GLUCOSE SERPL-MCNC: 138 MG/DL — HIGH (ref 70–99)
GLUCOSE UR QL: NEGATIVE MG/DL — SIGNIFICANT CHANGE UP
HCT VFR BLD CALC: 39 % — LOW (ref 42–52)
HGB BLD-MCNC: 13.3 G/DL — LOW (ref 14–18)
IMM GRANULOCYTES NFR BLD AUTO: 0.3 % — SIGNIFICANT CHANGE UP (ref 0.1–0.3)
KETONES UR-MCNC: NEGATIVE MG/DL — SIGNIFICANT CHANGE UP
LACTATE SERPL-SCNC: 1.2 MMOL/L — SIGNIFICANT CHANGE UP (ref 0.7–2)
LEUKOCYTE ESTERASE UR-ACNC: ABNORMAL
LIDOCAIN IGE QN: 30 U/L — SIGNIFICANT CHANGE UP (ref 7–60)
LYMPHOCYTES # BLD AUTO: 1.68 K/UL — SIGNIFICANT CHANGE UP (ref 1.2–3.4)
LYMPHOCYTES # BLD AUTO: 25.2 % — SIGNIFICANT CHANGE UP (ref 20.5–51.1)
MCHC RBC-ENTMCNC: 31.7 PG — HIGH (ref 27–31)
MCHC RBC-ENTMCNC: 34.1 G/DL — SIGNIFICANT CHANGE UP (ref 32–37)
MCV RBC AUTO: 93.1 FL — SIGNIFICANT CHANGE UP (ref 80–94)
MONOCYTES # BLD AUTO: 0.96 K/UL — HIGH (ref 0.1–0.6)
MONOCYTES NFR BLD AUTO: 14.4 % — HIGH (ref 1.7–9.3)
NEUTROPHILS # BLD AUTO: 3.92 K/UL — SIGNIFICANT CHANGE UP (ref 1.4–6.5)
NEUTROPHILS NFR BLD AUTO: 58.8 % — SIGNIFICANT CHANGE UP (ref 42.2–75.2)
NITRITE UR-MCNC: NEGATIVE — SIGNIFICANT CHANGE UP
NRBC # BLD: 0 /100 WBCS — SIGNIFICANT CHANGE UP (ref 0–0)
PH UR: 6 — SIGNIFICANT CHANGE UP (ref 5–8)
PLATELET # BLD AUTO: 107 K/UL — LOW (ref 130–400)
PMV BLD: 10.1 FL — SIGNIFICANT CHANGE UP (ref 7.4–10.4)
POTASSIUM SERPL-MCNC: 3.9 MMOL/L — SIGNIFICANT CHANGE UP (ref 3.5–5)
POTASSIUM SERPL-SCNC: 3.9 MMOL/L — SIGNIFICANT CHANGE UP (ref 3.5–5)
PROT SERPL-MCNC: 6.2 G/DL — SIGNIFICANT CHANGE UP (ref 6–8)
PROT UR-MCNC: SIGNIFICANT CHANGE UP MG/DL
RBC # BLD: 4.19 M/UL — LOW (ref 4.7–6.1)
RBC # FLD: 12.9 % — SIGNIFICANT CHANGE UP (ref 11.5–14.5)
SODIUM SERPL-SCNC: 131 MMOL/L — LOW (ref 135–146)
SP GR SPEC: 1.02 — SIGNIFICANT CHANGE UP (ref 1–1.03)
UROBILINOGEN FLD QL: 0.2 MG/DL — SIGNIFICANT CHANGE UP (ref 0.2–1)
WBC # BLD: 6.67 K/UL — SIGNIFICANT CHANGE UP (ref 4.8–10.8)
WBC # FLD AUTO: 6.67 K/UL — SIGNIFICANT CHANGE UP (ref 4.8–10.8)

## 2024-12-09 PROCEDURE — 85025 COMPLETE CBC W/AUTO DIFF WBC: CPT

## 2024-12-09 PROCEDURE — 36415 COLL VENOUS BLD VENIPUNCTURE: CPT

## 2024-12-09 PROCEDURE — 74177 CT ABD & PELVIS W/CONTRAST: CPT | Mod: MC

## 2024-12-09 PROCEDURE — 71046 X-RAY EXAM CHEST 2 VIEWS: CPT

## 2024-12-09 PROCEDURE — 99284 EMERGENCY DEPT VISIT MOD MDM: CPT

## 2024-12-09 PROCEDURE — 74177 CT ABD & PELVIS W/CONTRAST: CPT | Mod: 26,MC

## 2024-12-09 PROCEDURE — 96374 THER/PROPH/DIAG INJ IV PUSH: CPT | Mod: XU

## 2024-12-09 PROCEDURE — 76705 ECHO EXAM OF ABDOMEN: CPT | Mod: 26

## 2024-12-09 PROCEDURE — 71046 X-RAY EXAM CHEST 2 VIEWS: CPT | Mod: 26

## 2024-12-09 PROCEDURE — 83690 ASSAY OF LIPASE: CPT

## 2024-12-09 PROCEDURE — 99284 EMERGENCY DEPT VISIT MOD MDM: CPT | Mod: 25

## 2024-12-09 PROCEDURE — 82140 ASSAY OF AMMONIA: CPT

## 2024-12-09 PROCEDURE — 80048 BASIC METABOLIC PNL TOTAL CA: CPT

## 2024-12-09 PROCEDURE — 81001 URINALYSIS AUTO W/SCOPE: CPT

## 2024-12-09 PROCEDURE — 83605 ASSAY OF LACTIC ACID: CPT

## 2024-12-09 PROCEDURE — 80076 HEPATIC FUNCTION PANEL: CPT

## 2024-12-09 PROCEDURE — 87086 URINE CULTURE/COLONY COUNT: CPT

## 2024-12-09 PROCEDURE — 76705 ECHO EXAM OF ABDOMEN: CPT

## 2024-12-09 RX ORDER — CEFPODOXIME PROXETIL 100 MG/5ML
200 GRANULE, FOR SUSPENSION ORAL ONCE
Refills: 0 | Status: COMPLETED | OUTPATIENT
Start: 2024-12-09 | End: 2024-12-09

## 2024-12-09 RX ORDER — CEFPODOXIME PROXETIL 100 MG/5ML
1 GRANULE, FOR SUSPENSION ORAL
Qty: 20 | Refills: 0
Start: 2024-12-09 | End: 2024-12-18

## 2024-12-09 RX ORDER — SODIUM CHLORIDE 9 MG/ML
1000 INJECTION, SOLUTION INTRAMUSCULAR; INTRAVENOUS; SUBCUTANEOUS ONCE
Refills: 0 | Status: COMPLETED | OUTPATIENT
Start: 2024-12-09 | End: 2024-12-09

## 2024-12-09 RX ORDER — CEFTRIAXONE SODIUM 1 G
1000 VIAL (EA) INJECTION ONCE
Refills: 0 | Status: DISCONTINUED | OUTPATIENT
Start: 2024-12-09 | End: 2024-12-09

## 2024-12-09 RX ORDER — KETOROLAC TROMETHAMINE 30 MG/ML
15 INJECTION INTRAMUSCULAR; INTRAVENOUS ONCE
Refills: 0 | Status: DISCONTINUED | OUTPATIENT
Start: 2024-12-09 | End: 2024-12-09

## 2024-12-09 RX ADMIN — KETOROLAC TROMETHAMINE 15 MILLIGRAM(S): 30 INJECTION INTRAMUSCULAR; INTRAVENOUS at 18:59

## 2024-12-09 RX ADMIN — SODIUM CHLORIDE 1000 MILLILITER(S): 9 INJECTION, SOLUTION INTRAMUSCULAR; INTRAVENOUS; SUBCUTANEOUS at 20:55

## 2024-12-09 RX ADMIN — SODIUM CHLORIDE 1000 MILLILITER(S): 9 INJECTION, SOLUTION INTRAMUSCULAR; INTRAVENOUS; SUBCUTANEOUS at 18:59

## 2024-12-09 RX ADMIN — CEFPODOXIME PROXETIL 200 MILLIGRAM(S): 100 GRANULE, FOR SUSPENSION ORAL at 23:28

## 2024-12-09 NOTE — ED PROVIDER NOTE - CLINICAL SUMMARY MEDICAL DECISION MAKING FREE TEXT BOX
84-year-old male past medical history diabetes presenting for evaluation of fevers. patient is here with his daughter who states that patient was confused during the last week in setting of fever and URI symptoms. was being empirically treated with abx prescribed by his PCP. now, oriented x 3. acting at baseline. however endorses dysuria. denies chest pain or sob. endorses chronic RUQ. no flank pain. on exam, unremarkable neurological exam, on my exam no focal RUQ tenderness. lungs CTA. no cva tenderness. Labs unremarkable. CT abd/pelvis with cirrhosis which family and patient already know about, UA with UTI. given iv abx, abx switched. Patient instructed to return to ED if symptoms persist, worsen, or if they develop any new/concerning symptoms.

## 2024-12-09 NOTE — ED PROVIDER NOTE - PATIENT PORTAL LINK FT
You can access the FollowMyHealth Patient Portal offered by [facility] by registering at the following website: [email or web address]. By joining [employer]’s FollowMyHealth portal, you will also be able to view your health information using other applications (apps) compatible with our system.

## 2024-12-09 NOTE — ED PROVIDER NOTE - PHYSICAL EXAMINATION
Vital Signs: I have reviewed the initial vital signs.  CONSTITUTIONAL: Pt in no acute distress laying on stretcher.  SKIN: Skin exam is warm and dry, no acute rash.  HEAD: Normocephalic; atraumatic.  EYES: PERRL, EOM intact; conjunctiva and sclera clear.  ENT: No nasal discharge; airway clear.   NECK: Supple; FROM  CARD: S1, S2 normal; no murmurs, gallops, or rubs. Regular rate and rhythm.  RESP: CTAB. No wheezes, rales or rhonchi.  ABD: +RUQ tenderness. soft; non-distended;  no hepatosplenomegaly.  MSK: Normal ROM. No clubbing, cyanosis or edema. No CVA tenderness.

## 2024-12-09 NOTE — ED PROVIDER NOTE - CARE PLAN
1 Principal Discharge DX:	UTI (urinary tract infection)  Secondary Diagnosis:	Abdominal pain  Secondary Diagnosis:	Cirrhosis

## 2024-12-09 NOTE — ED PROVIDER NOTE - OBJECTIVE STATEMENT
84-year-old male past medical history diabetes, presents with right upper quadrant abdominal pain and pain with urination X 3 days. Additionally daughter states patient has had fever intermittently during this time, which improved today. Daughter states patient was seen by primary care today for the symptoms and referred to ED. Denies chest pain, shortness of breath, N/V/D.

## 2024-12-11 NOTE — CHART NOTE - NSCHARTNOTEFT_GEN_A_CORE
SPECIALTY: gastroenterology    [facility] MRN [id number] / Left message 12/10 & 12/11 - MARCELL

## 2024-12-11 NOTE — CHART NOTE - NSCHARTNOTEFT_GEN_A_CORE
SPECIALTY: gastroenterology    [facility] MRN [id number] / Left message 12/10 & 12/11 - MARCELL

## 2025-01-24 ENCOUNTER — RX RENEWAL (OUTPATIENT)
Age: 85
End: 2025-01-24

## 2025-03-10 ENCOUNTER — NON-APPOINTMENT (OUTPATIENT)
Age: 85
End: 2025-03-10

## 2025-03-10 ENCOUNTER — APPOINTMENT (OUTPATIENT)
Dept: CARDIOLOGY | Facility: CLINIC | Age: 85
End: 2025-03-10
Payer: MEDICARE

## 2025-03-10 VITALS
DIASTOLIC BLOOD PRESSURE: 64 MMHG | SYSTOLIC BLOOD PRESSURE: 114 MMHG | HEIGHT: 60 IN | WEIGHT: 216 LBS | BODY MASS INDEX: 42.41 KG/M2 | HEART RATE: 95 BPM

## 2025-03-10 DIAGNOSIS — I35.0 NONRHEUMATIC AORTIC (VALVE) STENOSIS: ICD-10-CM

## 2025-03-10 DIAGNOSIS — I10 ESSENTIAL (PRIMARY) HYPERTENSION: ICD-10-CM

## 2025-03-10 DIAGNOSIS — E11.9 TYPE 2 DIABETES MELLITUS W/OUT COMPLICATIONS: ICD-10-CM

## 2025-03-10 DIAGNOSIS — E78.5 HYPERLIPIDEMIA, UNSPECIFIED: ICD-10-CM

## 2025-03-10 PROCEDURE — 99214 OFFICE O/P EST MOD 30 MIN: CPT | Mod: 25

## 2025-03-10 PROCEDURE — 93000 ELECTROCARDIOGRAM COMPLETE: CPT

## 2025-03-10 RX ORDER — SITAGLIPTIN 100 MG/1
TABLET, FILM COATED ORAL DAILY
Refills: 0 | Status: ACTIVE | COMMUNITY

## 2025-04-23 ENCOUNTER — EMERGENCY (EMERGENCY)
Facility: HOSPITAL | Age: 85
LOS: 0 days | Discharge: HOME | End: 2025-04-23
Attending: EMERGENCY MEDICINE
Payer: MEDICARE

## 2025-04-23 ENCOUNTER — APPOINTMENT (OUTPATIENT)
Dept: CARDIOLOGY | Facility: CLINIC | Age: 85
End: 2025-04-23
Payer: MEDICARE

## 2025-04-23 VITALS
OXYGEN SATURATION: 96 % | TEMPERATURE: 98 F | SYSTOLIC BLOOD PRESSURE: 139 MMHG | WEIGHT: 199.96 LBS | HEART RATE: 81 BPM | RESPIRATION RATE: 18 BRPM | DIASTOLIC BLOOD PRESSURE: 73 MMHG

## 2025-04-23 VITALS
SYSTOLIC BLOOD PRESSURE: 98 MMHG | DIASTOLIC BLOOD PRESSURE: 56 MMHG | WEIGHT: 210 LBS | HEIGHT: 64 IN | HEART RATE: 80 BPM | BODY MASS INDEX: 35.85 KG/M2

## 2025-04-23 DIAGNOSIS — R07.9 CHEST PAIN, UNSPECIFIED: ICD-10-CM

## 2025-04-23 DIAGNOSIS — R79.89 OTHER SPECIFIED ABNORMAL FINDINGS OF BLOOD CHEMISTRY: ICD-10-CM

## 2025-04-23 DIAGNOSIS — R05.1 ACUTE COUGH: ICD-10-CM

## 2025-04-23 DIAGNOSIS — K76.0 FATTY (CHANGE OF) LIVER, NOT ELSEWHERE CLASSIFIED: ICD-10-CM

## 2025-04-23 DIAGNOSIS — R05.9 COUGH, UNSPECIFIED: ICD-10-CM

## 2025-04-23 DIAGNOSIS — I11.0 HYPERTENSIVE HEART DISEASE WITH HEART FAILURE: ICD-10-CM

## 2025-04-23 DIAGNOSIS — I25.10 ATHEROSCLEROTIC HEART DISEASE OF NATIVE CORONARY ARTERY WITHOUT ANGINA PECTORIS: ICD-10-CM

## 2025-04-23 DIAGNOSIS — I50.9 HEART FAILURE, UNSPECIFIED: ICD-10-CM

## 2025-04-23 DIAGNOSIS — Z79.01 LONG TERM (CURRENT) USE OF ANTICOAGULANTS: ICD-10-CM

## 2025-04-23 DIAGNOSIS — E11.9 TYPE 2 DIABETES MELLITUS WITHOUT COMPLICATIONS: ICD-10-CM

## 2025-04-23 DIAGNOSIS — I35.0 NONRHEUMATIC AORTIC (VALVE) STENOSIS: ICD-10-CM

## 2025-04-23 LAB
ALBUMIN SERPL ELPH-MCNC: 3.8 G/DL — SIGNIFICANT CHANGE UP (ref 3.5–5.2)
ALP SERPL-CCNC: 100 U/L — SIGNIFICANT CHANGE UP (ref 30–115)
ALT FLD-CCNC: 57 U/L — HIGH (ref 0–41)
ANION GAP SERPL CALC-SCNC: 12 MMOL/L — SIGNIFICANT CHANGE UP (ref 7–14)
AST SERPL-CCNC: 53 U/L — HIGH (ref 0–41)
BASOPHILS # BLD AUTO: 0.01 K/UL — SIGNIFICANT CHANGE UP (ref 0–0.2)
BASOPHILS NFR BLD AUTO: 0.4 % — SIGNIFICANT CHANGE UP (ref 0–1)
BILIRUB SERPL-MCNC: 0.3 MG/DL — SIGNIFICANT CHANGE UP (ref 0.2–1.2)
BUN SERPL-MCNC: 11 MG/DL — SIGNIFICANT CHANGE UP (ref 10–20)
CALCIUM SERPL-MCNC: 9 MG/DL — SIGNIFICANT CHANGE UP (ref 8.4–10.5)
CHLORIDE SERPL-SCNC: 106 MMOL/L — SIGNIFICANT CHANGE UP (ref 98–110)
CO2 SERPL-SCNC: 19 MMOL/L — SIGNIFICANT CHANGE UP (ref 17–32)
CREAT SERPL-MCNC: 0.9 MG/DL — SIGNIFICANT CHANGE UP (ref 0.7–1.5)
EGFR: 85 ML/MIN/1.73M2 — SIGNIFICANT CHANGE UP
EGFR: 85 ML/MIN/1.73M2 — SIGNIFICANT CHANGE UP
EOSINOPHIL # BLD AUTO: 0.08 K/UL — SIGNIFICANT CHANGE UP (ref 0–0.7)
EOSINOPHIL NFR BLD AUTO: 3 % — SIGNIFICANT CHANGE UP (ref 0–8)
GLUCOSE SERPL-MCNC: 145 MG/DL — HIGH (ref 70–99)
HCT VFR BLD CALC: 35 % — LOW (ref 42–52)
HGB BLD-MCNC: 12.3 G/DL — LOW (ref 14–18)
IMM GRANULOCYTES NFR BLD AUTO: 0 % — LOW (ref 0.1–0.3)
LYMPHOCYTES # BLD AUTO: 1.03 K/UL — LOW (ref 1.2–3.4)
LYMPHOCYTES # BLD AUTO: 38.6 % — SIGNIFICANT CHANGE UP (ref 20.5–51.1)
MCHC RBC-ENTMCNC: 32.5 PG — HIGH (ref 27–31)
MCHC RBC-ENTMCNC: 35.1 G/DL — SIGNIFICANT CHANGE UP (ref 32–37)
MCV RBC AUTO: 92.3 FL — SIGNIFICANT CHANGE UP (ref 80–94)
MONOCYTES # BLD AUTO: 0.33 K/UL — SIGNIFICANT CHANGE UP (ref 0.1–0.6)
MONOCYTES NFR BLD AUTO: 12.4 % — HIGH (ref 1.7–9.3)
NEUTROPHILS # BLD AUTO: 1.22 K/UL — LOW (ref 1.4–6.5)
NEUTROPHILS NFR BLD AUTO: 45.6 % — SIGNIFICANT CHANGE UP (ref 42.2–75.2)
NRBC BLD AUTO-RTO: 0 /100 WBCS — SIGNIFICANT CHANGE UP (ref 0–0)
NT-PROBNP SERPL-SCNC: 303 PG/ML — HIGH (ref 0–300)
PLATELET # BLD AUTO: 81 K/UL — LOW (ref 130–400)
PMV BLD: 9.4 FL — SIGNIFICANT CHANGE UP (ref 7.4–10.4)
POTASSIUM SERPL-MCNC: 4.1 MMOL/L — SIGNIFICANT CHANGE UP (ref 3.5–5)
POTASSIUM SERPL-SCNC: 4.1 MMOL/L — SIGNIFICANT CHANGE UP (ref 3.5–5)
PROT SERPL-MCNC: 6.6 G/DL — SIGNIFICANT CHANGE UP (ref 6–8)
RBC # BLD: 3.79 M/UL — LOW (ref 4.7–6.1)
RBC # FLD: 12.5 % — SIGNIFICANT CHANGE UP (ref 11.5–14.5)
SODIUM SERPL-SCNC: 137 MMOL/L — SIGNIFICANT CHANGE UP (ref 135–146)
TROPONIN T, HIGH SENSITIVITY RESULT: 12 NG/L — SIGNIFICANT CHANGE UP (ref 6–21)
TROPONIN T, HIGH SENSITIVITY RESULT: 19 NG/L — SIGNIFICANT CHANGE UP (ref 6–21)
WBC # BLD: 2.67 K/UL — LOW (ref 4.8–10.8)
WBC # FLD AUTO: 2.67 K/UL — LOW (ref 4.8–10.8)

## 2025-04-23 PROCEDURE — 99285 EMERGENCY DEPT VISIT HI MDM: CPT

## 2025-04-23 PROCEDURE — 80053 COMPREHEN METABOLIC PANEL: CPT

## 2025-04-23 PROCEDURE — 83880 ASSAY OF NATRIURETIC PEPTIDE: CPT

## 2025-04-23 PROCEDURE — 99213 OFFICE O/P EST LOW 20 MIN: CPT | Mod: 25

## 2025-04-23 PROCEDURE — 71045 X-RAY EXAM CHEST 1 VIEW: CPT | Mod: 26

## 2025-04-23 PROCEDURE — 93000 ELECTROCARDIOGRAM COMPLETE: CPT

## 2025-04-23 PROCEDURE — 71045 X-RAY EXAM CHEST 1 VIEW: CPT

## 2025-04-23 PROCEDURE — 84484 ASSAY OF TROPONIN QUANT: CPT

## 2025-04-23 PROCEDURE — 99285 EMERGENCY DEPT VISIT HI MDM: CPT | Mod: 25

## 2025-04-23 PROCEDURE — 93005 ELECTROCARDIOGRAM TRACING: CPT

## 2025-04-23 PROCEDURE — 85025 COMPLETE CBC W/AUTO DIFF WBC: CPT

## 2025-04-23 PROCEDURE — 93010 ELECTROCARDIOGRAM REPORT: CPT | Mod: 77

## 2025-04-23 PROCEDURE — 93010 ELECTROCARDIOGRAM REPORT: CPT

## 2025-04-23 PROCEDURE — 36415 COLL VENOUS BLD VENIPUNCTURE: CPT

## 2025-04-23 RX ORDER — DEXTROMETHORPHAN HBR. AND GUAIFENESIN 20; 200 MG/10ML; MG/10ML
10-100 SOLUTION ORAL EVERY 4 HOURS
Qty: 1 | Refills: 0 | Status: ACTIVE | COMMUNITY
Start: 2025-04-23 | End: 1900-01-01

## 2025-04-23 RX ORDER — DEXTROMETHORPHAN HBR, GUAIFENESIN 20; 200 MG/10ML; MG/10ML
10 SOLUTION ORAL ONCE
Refills: 0 | Status: COMPLETED | OUTPATIENT
Start: 2025-04-23 | End: 2025-04-23

## 2025-04-23 RX ORDER — ASPIRIN 325 MG
325 TABLET ORAL ONCE
Refills: 0 | Status: COMPLETED | OUTPATIENT
Start: 2025-04-23 | End: 2025-04-23

## 2025-04-23 RX ADMIN — Medication 325 MILLIGRAM(S): at 04:43

## 2025-04-23 RX ADMIN — DEXTROMETHORPHAN HBR, GUAIFENESIN 10 MILLILITER(S): 20; 200 SOLUTION ORAL at 05:45

## 2025-04-23 NOTE — ED PROVIDER NOTE - OBJECTIVE STATEMENT
85yo m h/o dm cad c/o productive cough and chest pain x 2 weeks. States tonight he felt like he couldn't breathe due to the coughing and not being able to expectorate.

## 2025-04-23 NOTE — ED PROVIDER NOTE - PHYSICAL EXAMINATION
CONST: NAD, WDWN  EYES: PERRLA, no photophobia, EOMI without pain, conjunctiva pink   ENT: Moist mucous membranes, no nasal discharge.   NECK: Supple, non-tender, no resistance  to flexion  CARD: Regular rate and rhythm  RESP: No resp distress, CTA b/l, no w/r/r  GI: (+) BS x 4, soft, non-tender, non-distended, no guarding/rebound  MS: FROM x4.  SKIN: Warm, dry, no acute rashes. Good turgor  NEURO: A&Ox3, No focal deficits. Strength 5/5 with no sensory deficits. Steady gait

## 2025-04-23 NOTE — ED PROVIDER NOTE - CARE PROVIDER_API CALL
Dre Mathur  Interventional Cardiology  [address] [place] [address], [address]  [place] [address]  Phone: [phone]  Fax: [phone]  Established Patient  Follow Up Time: Urgent    PCP,   your PCP  Phone: (   )    -  Fax: (   )    -  Established Patient  Follow Up Time: Urgent

## 2025-04-23 NOTE — ED ADULT NURSE NOTE - NSFALLRISK_ED_ALL_ED
Assessment completed, Pt A&Ox4. Pt up self, denies pain, and nausea. Right PICC with + blood return. Discussed POC, no questions at this time. Call light within reach, hourly rounding in place.   No

## 2025-04-23 NOTE — ED PROVIDER NOTE - CARE PROVIDERS DIRECT ADDRESSES
[email or web address]

## 2025-04-23 NOTE — ED PROVIDER NOTE - ATTENDING APP SHARED VISIT CONTRIBUTION OF CARE
84M pmh ?chf on lasix, dm, htn, cirrhosis p/w cp after coughing episode this evening. Described as sharp substernal cp radiating up to neck. Endorses incr cough last few weeks. Denies f/c, sore throat, uri sx, nv, abd pain, edema. Cardio Dr. Mathur, states he saw him recently - EMR reviewed, last op visit Mar 2025, last nuc stress Mar 2023.    PE:  elderly M nad  skin warm, dry  ncat  neck supple  rrr nl s1s2 no mrg  ctab no wrr  abd soft ntnd no palpable masses no rgr  back non-tender no cvat  ext no cce dpi  neuro aaox3 grossly nf exam

## 2025-04-23 NOTE — ED PROVIDER NOTE - CARE PLAN
Principal Discharge DX:	Chest pain  Secondary Diagnosis:	Cough  Secondary Diagnosis:	Elevated troponin   1

## 2025-04-23 NOTE — ED ADULT NURSE NOTE - OBJECTIVE STATEMENT
pt c/o chest pain w/ cough that started a3 days ago worsening today. denies SOB. denies any cardiac hx.

## 2025-04-23 NOTE — ED PROVIDER NOTE - PATIENT PORTAL LINK FT
You can access the FollowMyHealth Patient Portal offered by [facility] by registering at the following website: [email or web address]. By joining [employer]’s FollowMyHealth portal, you will also be able to view your health information using other applications (apps) compatible with our system.

## 2025-04-23 NOTE — ED PROVIDER NOTE - NSFOLLOWUPINSTRUCTIONS_ED_ALL_ED_FT
Chest Pain    Chest pain can be caused by many different conditions which may or may not be dangerous. Causes include heartburn, lung infections, heart attack, blood clot in lungs, skin infections, strain or damage to muscle, cartilage, or bones, etc. Lab tests or other studies including an electrocardiogram (EKG) may have been performed to find the cause of your pain. Make sure to follow up with a cardiologist or as instructed by your health care professional.    SEEK IMMEDIATE MEDICAL CARE IF YOU HAVE THE FOLLOWING SYMPTOMS: worsening chest pain, coughing up blood, unexplained back/neck/jaw pain, severe abdominal pain, dizziness or lightheadedness, shortness of breath, sweaty or clammy skin, vomiting, or racing heart beat. These symptoms may represent a serious problem that is an emergency. Do not wait to see if the symptoms will go away. Get medical help right away. Call your local emergency services (911 in the [place] Do not drive yourself to the hospital.    Cough    Coughing is a reflex that clears your throat and your airways. Coughing helps to heal and protect your lungs. It is normal to cough occasionally, but a cough that happens with other symptoms or lasts a long time may be a sign of a condition that needs treatment. Coughing may be caused by infections, asthma or COPD, smoking, postnasal drip, gastroesophageal reflux, as well as other medical conditions. Take medicines only as instructed by your health care provider. Avoid anything that causes you to cough at work or at home including smoking.    SEEK IMMEDIATE MEDICAL CARE IF YOU HAVE THE FOLLOWING SYMPTOMS: coughing up blood, shortness of breath, rapid heart rate, chest pain, unexplained weight loss or night sweats.

## 2025-04-23 NOTE — ED PROVIDER NOTE - CLINICAL SUMMARY MEDICAL DECISION MAKING FREE TEXT BOX
Labs, EKG and imaging were ordered, where indicated.  Independent interpretation of any labs, EKG & imaging that was ordered was performed by me, Dr. Stewart. Appropriate medications for patient's presenting complaints were ordered and effects were reassessed, where indicated.  Patient's records (prior hospital, ED visit, and/or nursing home note) were reviewed, if available.  Additional history was obtained from EMS, family, and/or PCP (where available).  Escalation to admission/observation was considered.  Patient requires inpatient hospitalization - monitored setting. PATIENT REQUESTED TO LEAVE AMA.    cp, h/o ?chf on lasix - ekg nsr, cxr clear, labs sig for rising serial tns (12->19) - pt advised of rec for admission, however opt requesting to leave AMA - risks of same explained to pt incl. possible disability/death - pt verbalized understanding of same - all results d/w pt & copies given, strict return precautions discussed, rec urgent op Cardio f/u with Dr. Mathur

## 2025-04-23 NOTE — ED PROVIDER NOTE - PROVIDER TOKENS
PROVIDER:[TOKEN:[[id number]],FOLLOWUP:[Urgent],ESTABLISHEDPATIENT:[T]],FREE:[LAST:[PCP],PHONE:[(   )    -],FAX:[(   )    -],ADDRESS:[your PCP],FOLLOWUP:[Urgent],ESTABLISHEDPATIENT:[T]]

## 2025-04-24 ENCOUNTER — APPOINTMENT (OUTPATIENT)
Dept: CARDIOLOGY | Facility: CLINIC | Age: 85
End: 2025-04-24
Payer: MEDICARE

## 2025-04-24 ENCOUNTER — NON-APPOINTMENT (OUTPATIENT)
Age: 85
End: 2025-04-24

## 2025-04-24 PROCEDURE — 93308 TTE F-UP OR LMTD: CPT

## 2025-05-08 ENCOUNTER — APPOINTMENT (OUTPATIENT)
Dept: CARDIOLOGY | Facility: CLINIC | Age: 85
End: 2025-05-08
Payer: MEDICARE

## 2025-05-08 VITALS
BODY MASS INDEX: 33.8 KG/M2 | SYSTOLIC BLOOD PRESSURE: 108 MMHG | OXYGEN SATURATION: 95 % | HEIGHT: 64 IN | WEIGHT: 198 LBS | RESPIRATION RATE: 16 BRPM | TEMPERATURE: 98.2 F | DIASTOLIC BLOOD PRESSURE: 64 MMHG | HEART RATE: 88 BPM

## 2025-05-08 DIAGNOSIS — I35.0 NONRHEUMATIC AORTIC (VALVE) STENOSIS: ICD-10-CM

## 2025-05-08 DIAGNOSIS — I10 ESSENTIAL (PRIMARY) HYPERTENSION: ICD-10-CM

## 2025-05-08 LAB
ALBUMIN SERPL ELPH-MCNC: 4.1 G/DL
ALP BLD-CCNC: 99 U/L
ALT SERPL-CCNC: 49 U/L
ANION GAP SERPL CALC-SCNC: 10 MMOL/L
AST SERPL-CCNC: 53 U/L
BILIRUB SERPL-MCNC: 0.4 MG/DL
BUN SERPL-MCNC: 10 MG/DL
CALCIUM SERPL-MCNC: 9.5 MG/DL
CHLORIDE SERPL-SCNC: 106 MMOL/L
CO2 SERPL-SCNC: 25 MMOL/L
CREAT SERPL-MCNC: 0.9 MG/DL
EGFRCR SERPLBLD CKD-EPI 2021: 85 ML/MIN/1.73M2
GLUCOSE SERPL-MCNC: 121 MG/DL
HCT VFR BLD CALC: 40.1 %
HGB BLD-MCNC: 13.6 G/DL
INR PPP: 1.11 RATIO
MCHC RBC-ENTMCNC: 32.6 PG
MCHC RBC-ENTMCNC: 33.9 G/DL
MCV RBC AUTO: 96.2 FL
PLATELET # BLD AUTO: 79 K/UL
PMV BLD AUTO: 0 /100 WBCS
PMV BLD: 10.9 FL
POTASSIUM SERPL-SCNC: 4.7 MMOL/L
PROT SERPL-MCNC: 7 G/DL
PT BLD: 13.1 SEC
RBC # BLD: 4.17 M/UL
RBC # FLD: 13 %
SODIUM SERPL-SCNC: 141 MMOL/L
WBC # FLD AUTO: 2.79 K/UL

## 2025-05-08 PROCEDURE — 99204 OFFICE O/P NEW MOD 45 MIN: CPT

## 2025-05-19 ENCOUNTER — NON-APPOINTMENT (OUTPATIENT)
Age: 85
End: 2025-05-19

## 2025-05-20 ENCOUNTER — NON-APPOINTMENT (OUTPATIENT)
Age: 85
End: 2025-05-20

## 2025-05-21 ENCOUNTER — NON-APPOINTMENT (OUTPATIENT)
Age: 85
End: 2025-05-21

## 2025-05-28 VITALS
OXYGEN SATURATION: 97 % | HEART RATE: 87 BPM | RESPIRATION RATE: 17 BRPM | SYSTOLIC BLOOD PRESSURE: 147 MMHG | DIASTOLIC BLOOD PRESSURE: 80 MMHG

## 2025-05-28 NOTE — H&P CARDIOLOGY - HISTORY OF PRESENT ILLNESS
86 y/o M with PMHx of HTN, HLD, DM-II, HFpEF, aortic stenosis, thrombocytopenia (due to hepatic steatosis), who presented to his cardiologist for evaluation of his arotic stenosis.  Pt has been experiencing MEDRANO.................. Denies any chest pain, dizziness, n/v, diaphoresis, orthopnea, PND, LE edema, palpitations, syncope.  TTE 4/25/25: EF 59%, severe AS.  Pt is now referred for [facility] with possible intervention if clinically indicated, prior to possible TAVR.    Pre cath note:  indication:  [ ] STEMI                [ ] NSTEMI                 [ ] Acute coronary syndrome                   [ ]Unstable Angina   [ ] high risk  [ ] intermediate risk  [ ] low risk                   [x ] Stable Angina     non-invasive testing:  TTE         Date:     4/25/25    result: [ ] high risk  [ x] intermediate risk  [ ] low risk    Anti- Anginal medications:                    [ ] not used d/t                     [x ] used   ( ) BB     ( ) CCB      (x ) Nitrate   (  ) Ranexa          [ ] not used but strong indication not to use    Ejection Fraction                   [ ] <29            [ ] 30-39%   [ ] 40-49%     [ x]>50%    CHF          [ ] active (within last 14 days on meds   [x ] Chronic (on meds but no exacerbation)  NYHA Functional Class:  (  ) Class I (no limitations)  ( x ) Class II (slight limitation)  (  ) Class III (marked limitation)  (  ) Class IV (symptoms at rest)    COPD                   [ ] mild (on chronic bronchodilators)  [ ] moderate (on chronic steroid therapy)      [ ] severe (indication for home O2 or PACO2 >50)    Other risk factors:                     [ ] Previous MI                     [ ] CVA/ stroke                    [ ] carotid stent/ CEA                    [ ] PVD/PAD- (arterial aneurysm, non-palpable pulses, tortuous vessel with inability to insert catheter, infra-renal dissection, renal or subclavian artery stenosis)                    [ ] previous CABG                    [ ] Renal Failure:  on HD  (  ) yes  (  ) no                    [ x] Diabetic  (  ) Type 1  (  x) Type 2                                         (  ) Insulin dependent  (x ) non-insulin dependent                                         ( x ) Metformin  (  x) Januvia  (  ) Glimepiride  (  ) Glipizide  (  ) Glyburide  ( x ) Actos                                         (  ) GLP-1 receptor agonists (Ozempic, Mounjaro, Wegovy, Zepbound, Trulicity, Byetta, Victoza)                                         (  ) SGLT2 Inhibitors (Farxiga, Jardiance, Invokana)                                         (  ) Other    Bleeding Risk: 1.1%    Pre-cath Hydration: (  )  cc IV bolus x 1 over 1 hr followed by:    (  ) NS @ 75cc/hr until procedure (up to 2 hrs) if EF> 50%                                                                                                                             (  ) NS @ 50cc/hr until procedure (up to 2 hrs) if EF< 50%                                        (  ) No precath hydration d/t      RIGHT RADIAL ARTERY EVALUATION:  LUIS CARLOS TEST: [] Negative          [] Positive       84 y/o M with PMHx of HTN, HLD, DM-II, HFpEF, aortic stenosis, thrombocytopenia (due to hepatic steatosis), who presented to his cardiologist for evaluation of his arotic stenosis.  Pt has been experiencing MEDRANO. Denies any chest pain, dizziness, n/v, diaphoresis, orthopnea, PND, LE edema, palpitations, syncope.  TTE 4/25/25: EF 59%, severe AS.  Pt is now referred for C with possible intervention if clinically indicated, prior to possible TAVR.    Pre cath note:  indication:  [ ] STEMI                [ ] NSTEMI                 [ ] Acute coronary syndrome                   [ ]Unstable Angina   [ ] high risk  [ ] intermediate risk  [ ] low risk                   [x ] Stable Angina     non-invasive testing:  TTE         Date:     4/25/25    result: [ ] high risk  [ x] intermediate risk  [ ] low risk    Anti- Anginal medications:                    [ ] not used d/t                     [x ] used   ( ) BB     ( ) CCB      (x ) Nitrate   (  ) Ranexa          [ ] not used but strong indication not to use    Ejection Fraction                   [ ] <29            [ ] 30-39%   [ ] 40-49%     [ x]>50%    CHF          [ ] active (within last 14 days on meds   [x ] Chronic (on meds but no exacerbation)  NYHA Functional Class:  (  ) Class I (no limitations)  ( x ) Class II (slight limitation)  (  ) Class III (marked limitation)  (  ) Class IV (symptoms at rest)    COPD                   [ ] mild (on chronic bronchodilators)  [ ] moderate (on chronic steroid therapy)      [ ] severe (indication for home O2 or PACO2 >50)    Other risk factors:                     [ ] Previous MI                     [ ] CVA/ stroke                    [ ] carotid stent/ CEA                    [ ] PVD/PAD- (arterial aneurysm, non-palpable pulses, tortuous vessel with inability to insert catheter, infra-renal dissection, renal or subclavian artery stenosis)                    [ ] previous CABG                    [ ] Renal Failure:  on HD  (  ) yes  (  ) no                    [ x] Diabetic  (  ) Type 1  (  x) Type 2                                         (  ) Insulin dependent  (x ) non-insulin dependent                                         ( x ) Metformin  (  x) Januvia  (  ) Glimepiride  (  ) Glipizide  (  ) Glyburide  ( x ) Actos                                         (  ) GLP-1 receptor agonists (Ozempic, Mounjaro, Wegovy, Zepbound, Trulicity, Byetta, Victoza)                                         (  ) SGLT2 Inhibitors (Farxiga, Jardiance, Invokana)                                         (  ) Other    Bleeding Risk: 1.1%    Pre-cath Hydration: ( x )  cc IV bolus x 1 over 1 hr followed by:    (  ) NS @ 75cc/hr until procedure (up to 2 hrs) if EF> 50%                                                                                                                             (  ) NS @ 50cc/hr until procedure (up to 2 hrs) if EF< 50%                                        (  ) No precath hydration d/t      RIGHT RADIAL ARTERY EVALUATION:  LUIS CARLOS TEST: [] Negative          [x] Positive

## 2025-05-29 ENCOUNTER — TRANSCRIPTION ENCOUNTER (OUTPATIENT)
Age: 85
End: 2025-05-29

## 2025-05-29 ENCOUNTER — OUTPATIENT (OUTPATIENT)
Dept: OUTPATIENT SERVICES | Facility: HOSPITAL | Age: 85
LOS: 1 days | Discharge: HOME | End: 2025-05-29
Payer: MEDICARE

## 2025-05-29 VITALS
RESPIRATION RATE: 18 BRPM | HEART RATE: 80 BPM | OXYGEN SATURATION: 98 % | SYSTOLIC BLOOD PRESSURE: 140 MMHG | DIASTOLIC BLOOD PRESSURE: 80 MMHG

## 2025-05-29 DIAGNOSIS — I35.0 NONRHEUMATIC AORTIC (VALVE) STENOSIS: ICD-10-CM

## 2025-05-29 DIAGNOSIS — Z90.49 ACQUIRED ABSENCE OF OTHER SPECIFIED PARTS OF DIGESTIVE TRACT: Chronic | ICD-10-CM

## 2025-05-29 LAB
ANION GAP SERPL CALC-SCNC: 11 MMOL/L — SIGNIFICANT CHANGE UP (ref 7–14)
ANION GAP SERPL CALC-SCNC: 12 MMOL/L — SIGNIFICANT CHANGE UP (ref 7–14)
BUN SERPL-MCNC: 15 MG/DL — SIGNIFICANT CHANGE UP (ref 10–20)
BUN SERPL-MCNC: 15 MG/DL — SIGNIFICANT CHANGE UP (ref 10–20)
CALCIUM SERPL-MCNC: 8.6 MG/DL — SIGNIFICANT CHANGE UP (ref 8.4–10.5)
CALCIUM SERPL-MCNC: 9.1 MG/DL — SIGNIFICANT CHANGE UP (ref 8.4–10.5)
CHLORIDE SERPL-SCNC: 106 MMOL/L — SIGNIFICANT CHANGE UP (ref 98–110)
CHLORIDE SERPL-SCNC: 108 MMOL/L — SIGNIFICANT CHANGE UP (ref 98–110)
CO2 SERPL-SCNC: 20 MMOL/L — SIGNIFICANT CHANGE UP (ref 17–32)
CO2 SERPL-SCNC: 20 MMOL/L — SIGNIFICANT CHANGE UP (ref 17–32)
CREAT SERPL-MCNC: 0.9 MG/DL — SIGNIFICANT CHANGE UP (ref 0.7–1.5)
CREAT SERPL-MCNC: 1 MG/DL — SIGNIFICANT CHANGE UP (ref 0.7–1.5)
EGFR: 74 ML/MIN/1.73M2 — SIGNIFICANT CHANGE UP
EGFR: 74 ML/MIN/1.73M2 — SIGNIFICANT CHANGE UP
EGFR: 84 ML/MIN/1.73M2 — SIGNIFICANT CHANGE UP
EGFR: 84 ML/MIN/1.73M2 — SIGNIFICANT CHANGE UP
GLUCOSE BLDC GLUCOMTR-MCNC: 154 MG/DL — HIGH (ref 70–99)
GLUCOSE SERPL-MCNC: 122 MG/DL — HIGH (ref 70–99)
GLUCOSE SERPL-MCNC: 161 MG/DL — HIGH (ref 70–99)
HCT VFR BLD CALC: 36 % — LOW (ref 42–52)
HCT VFR BLD CALC: 39.8 % — LOW (ref 42–52)
HGB BLD-MCNC: 12.4 G/DL — LOW (ref 14–18)
HGB BLD-MCNC: 13.3 G/DL — LOW (ref 14–18)
MCHC RBC-ENTMCNC: 31.3 PG — HIGH (ref 27–31)
MCHC RBC-ENTMCNC: 31.9 PG — HIGH (ref 27–31)
MCHC RBC-ENTMCNC: 33.4 G/DL — SIGNIFICANT CHANGE UP (ref 32–37)
MCHC RBC-ENTMCNC: 34.4 G/DL — SIGNIFICANT CHANGE UP (ref 32–37)
MCV RBC AUTO: 92.5 FL — SIGNIFICANT CHANGE UP (ref 80–94)
MCV RBC AUTO: 93.6 FL — SIGNIFICANT CHANGE UP (ref 80–94)
NRBC BLD AUTO-RTO: 0 /100 WBCS — SIGNIFICANT CHANGE UP (ref 0–0)
NRBC BLD AUTO-RTO: 0 /100 WBCS — SIGNIFICANT CHANGE UP (ref 0–0)
PLATELET # BLD AUTO: 72 K/UL — LOW (ref 130–400)
PLATELET # BLD AUTO: 90 K/UL — LOW (ref 130–400)
PMV BLD: 10.2 FL — SIGNIFICANT CHANGE UP (ref 7.4–10.4)
PMV BLD: 9.8 FL — SIGNIFICANT CHANGE UP (ref 7.4–10.4)
POTASSIUM SERPL-MCNC: 4 MMOL/L — SIGNIFICANT CHANGE UP (ref 3.5–5)
POTASSIUM SERPL-MCNC: 4.4 MMOL/L — SIGNIFICANT CHANGE UP (ref 3.5–5)
POTASSIUM SERPL-SCNC: 4 MMOL/L — SIGNIFICANT CHANGE UP (ref 3.5–5)
POTASSIUM SERPL-SCNC: 4.4 MMOL/L — SIGNIFICANT CHANGE UP (ref 3.5–5)
RBC # BLD: 3.89 M/UL — LOW (ref 4.7–6.1)
RBC # BLD: 4.25 M/UL — LOW (ref 4.7–6.1)
RBC # FLD: 12.8 % — SIGNIFICANT CHANGE UP (ref 11.5–14.5)
RBC # FLD: 13 % — SIGNIFICANT CHANGE UP (ref 11.5–14.5)
SODIUM SERPL-SCNC: 138 MMOL/L — SIGNIFICANT CHANGE UP (ref 135–146)
SODIUM SERPL-SCNC: 139 MMOL/L — SIGNIFICANT CHANGE UP (ref 135–146)
WBC # BLD: 2.84 K/UL — LOW (ref 4.8–10.8)
WBC # BLD: 3.23 K/UL — LOW (ref 4.8–10.8)
WBC # FLD AUTO: 2.84 K/UL — LOW (ref 4.8–10.8)
WBC # FLD AUTO: 3.23 K/UL — LOW (ref 4.8–10.8)

## 2025-05-29 PROCEDURE — 85027 COMPLETE CBC AUTOMATED: CPT

## 2025-05-29 PROCEDURE — 93454 CORONARY ARTERY ANGIO S&I: CPT | Mod: 59

## 2025-05-29 PROCEDURE — C1894: CPT

## 2025-05-29 PROCEDURE — C9600: CPT | Mod: RC

## 2025-05-29 PROCEDURE — C1725: CPT

## 2025-05-29 PROCEDURE — 36415 COLL VENOUS BLD VENIPUNCTURE: CPT

## 2025-05-29 PROCEDURE — 93454 CORONARY ARTERY ANGIO S&I: CPT | Mod: 26,XU

## 2025-05-29 PROCEDURE — 80048 BASIC METABOLIC PNL TOTAL CA: CPT

## 2025-05-29 PROCEDURE — 92928 PRQ TCAT PLMT NTRAC ST 1 LES: CPT | Mod: RC

## 2025-05-29 PROCEDURE — C1874: CPT

## 2025-05-29 PROCEDURE — 82962 GLUCOSE BLOOD TEST: CPT

## 2025-05-29 PROCEDURE — C1887: CPT

## 2025-05-29 PROCEDURE — C1769: CPT

## 2025-05-29 RX ORDER — CLOPIDOGREL BISULFATE 75 MG/1
1 TABLET, FILM COATED ORAL
Qty: 30 | Refills: 3
Start: 2025-05-29 | End: 2025-09-25

## 2025-05-29 RX ORDER — ASPIRIN 325 MG
324 TABLET ORAL ONCE
Refills: 0 | Status: COMPLETED | OUTPATIENT
Start: 2025-05-29 | End: 2025-05-29

## 2025-05-29 RX ORDER — ROSUVASTATIN CALCIUM 20 MG/1
1 TABLET, FILM COATED ORAL
Qty: 30 | Refills: 2
Start: 2025-05-29 | End: 2025-08-26

## 2025-05-29 RX ADMIN — Medication 100 MILLILITER(S): at 11:21

## 2025-05-29 RX ADMIN — Medication 324 MILLIGRAM(S): at 08:32

## 2025-05-29 RX ADMIN — Medication 250 MILLILITER(S): at 08:32

## 2025-05-29 NOTE — ASU DISCHARGE PLAN (ADULT/PEDIATRIC) - FINANCIAL ASSISTANCE
[facility] provides services at a reduced cost to those who are determined to be eligible through [facility]’s financial assistance program. Information regarding [facility]’s financial assistance program can be found by going to [email or web address] or by calling [phone].

## 2025-05-29 NOTE — PROGRESS NOTE ADULT - SUBJECTIVE AND OBJECTIVE BOX
Interventional Cardiology Discharge Note:  s/p PCI/PETE mRCA    MARKUS OSULLIVAN   85y Male    PAST MEDICAL & SURGICAL HISTORY:  DM (diabetes mellitus)    HTN (hypertension)    BPH (benign prostatic hyperplasia)    Malaria  Treated more than 20 years ago while he was working in [place]    Schistosomosis  Treated more than 20 years ago while he was working in [place]    History of viral hepatitis  Treated more than 20 years ago    History of appendectomy      HPI:    86 y/o M with PMHx of HTN, HLD, DM-II, HFpEF, aortic stenosis, thrombocytopenia (due to hepatic steatosis), who presented to his cardiologist for evaluation of his arotic stenosis.  Pt has been experiencing MEDRANO. Denies any chest pain, dizziness, n/v, diaphoresis, orthopnea, PND, LE edema, palpitations, syncope.  TTE 4/25/25: EF 59%, severe AS.  Pt is now referred for LHC with possible intervention if clinically indicated, prior to possible TAVR.  S/P LHC 5/29/25: PCI/PETE mRCA    Allergies  No Known Allergies    Patient seen and examined at bedside.   Patient without complaints.   Denies CP, SOB, palpitations, or dizziness    Vital Signs Last 24 Hrs  T(C): 36.6 (29 May 2025 08:12), Max: 36.6 (29 May 2025 08:12)  T(F): 97.9 (29 May 2025 08:12), Max: 97.9 (29 May 2025 08:12)  HR: 89 (29 May 2025 08:12) (87 - 89)  BP: 147/80 (29 May 2025 08:12) (147/80 - 147/80)  BP(mean): 102 (28 May 2025 14:04) (102 - 102)  RR: 16 (29 May 2025 08:12) (16 - 17)  SpO2: 98% (29 May 2025 08:12) (97% - 98%)    Parameters below as of 28 May 2025 14:04  Patient On (Oxygen Delivery Method): room air    MEDICATIONS  (STANDING):  chlorhexidine 4% Liquid 1 Application(s) Topical once  sodium chloride 0.9%. 1000 milliLiter(s) (100 mL/Hr) IV Continuous <Continuous>      REVIEW OF SYSTEMS:          All negative except as mentioned in HPI    PHYSICAL EXAM:           CONSTITUTIONAL: Well-developed; well-nourished; in no acute distress  	SKIN: warm, dry  	HEAD: Normocephalic; atraumatic  	EYES: PERRL.  	ENT: No nasal discharge, airway clear, mucous membranes moist  	NECK: Supple; non tender.  	CARD: +S1, +S2, no murmurs, gallops, or rubs. Regular rate and rhythm    	RESP: No wheezes, rales or rhonchi. CTA B/L  	ABD: soft ntnd, + BS x 4 quadrants  	EXT: moves all extremities,  no clubbing, cyanosis or edema  	NEURO: Alert and oriented x3, no focal deficits          PSYCH: Cooperative, appropriate          VASCULAR:  + Rad / + PTs / + DPs          EXTREMITY:            	   Right Radial: D-stat in place, access site soft, no hematoma, no pain, + pulses, no sign of infection, no numbness              LABS:                        13.3   3.23  )-----------( 90       ( 29 May 2025 08:25 )             39.8     05-29    138  |  106  |  15  ----------------------------<  161[H]  4.4   |  20  |  1.0    Ca    9.1      29 May 2025 08:25      A/P:  I discussed the case with Cardiologist Dr. Ibarra  and recommend the following:    S/P PCI:    Intervention: Successful PCI of mid-RCA with balloon angioplasty s/p PETE x 1  Implants: SYNERGY XD 3.0X12MM      FINDINGS:     Coronary Dominance: Right  LM: Mild disease.   LAD: Mild disease. Medium sized.   D1: Mild disease.   CX: Medium to large sized. Minor luminal irregularities.   OM1: Small. Minor luminal irregularities.   OM2: Medium to large sized. Minor luminal irregularities.   RCA: 90% lesion in mid-RCA s/p PCI with PETE.   RPDA: Minor luminal irregularities.     	         Continue DAPT ( Aspirin 81 mg PO Daily and Plavix 75mg po daily), Lisinopril, Imdur, and added Statin Therapy (Rosuvastatin 20mg)                   No B-Blocker due to soft BP                   Patient given 30 day supply of ( Aspirin 81 mg daily and Plavix 75 mg daily ) to take at home                   **GI prophylaxis with Pepcid 20mg (sent to pharmacy)                   CBC @ 14:45                   EKG prior to d/c @ 14:45                   NS @ 100 ml/hr x 5 hrs                   **OOB to chair/Ambulate with assistance                   Monitor access site/ distal pulses                   Patient agreeing to take DAPT for at least one year or as directed by cardiologist                    Pt given instructions on importance of taking antiplatelet medication or risk acute stent thrombosis/death                   Post cath instructions, access site care and activity restrictions reviewed with patient                     Discussed with patient to return to hospital if experience chest pain, shortness breath, dizziness and site bleeding                   Aggressive risk factor modification, diet counseling, smoking cessation discussed with patient                    Benefits of cardiac rehab discussed with patient.                   Cardiac Rehab info provided /Referral and communication to cardiac rehab completed.                   Patient instructed to call cardiac rehab and make initial appointment after first follow-up visit with Cardiologist                    Can discharge patient @ 16:45 from cardiac standpoint after ambulating without symptoms, access site wnl, labs and ECG reviewed                    Follow up with Cardiology Dr. Mathur in two weeks.  Instructed to call and make an appointment                                       Interventional Cardiology Discharge Note:  s/p PCI/PETE mRCA    MARKUS OSULLIVAN   85y Male    PAST MEDICAL & SURGICAL HISTORY:  DM (diabetes mellitus)    HTN (hypertension)    BPH (benign prostatic hyperplasia)    Malaria  Treated more than 20 years ago while he was working in [place]    Schistosomosis  Treated more than 20 years ago while he was working in [place]    History of viral hepatitis  Treated more than 20 years ago    History of appendectomy      HPI:    84 y/o M with PMHx of HTN, HLD, DM-II, HFpEF, aortic stenosis, thrombocytopenia (due to hepatic steatosis), who presented to his cardiologist for evaluation of his arotic stenosis.  Pt has been experiencing MEDRANO. Denies any chest pain, dizziness, n/v, diaphoresis, orthopnea, PND, LE edema, palpitations, syncope.  TTE 4/25/25: EF 59%, severe AS.  Pt is now referred for LHC with possible intervention if clinically indicated, prior to possible TAVR.  S/P LHC 5/29/25: PCI/PETE mRCA    Allergies  No Known Allergies    Patient seen and examined at bedside.   Patient without complaints.   Denies CP, SOB, palpitations, or dizziness    Vital Signs Last 24 Hrs  T(C): 36.6 (29 May 2025 08:12), Max: 36.6 (29 May 2025 08:12)  T(F): 97.9 (29 May 2025 08:12), Max: 97.9 (29 May 2025 08:12)  HR: 89 (29 May 2025 08:12) (87 - 89)  BP: 147/80 (29 May 2025 08:12) (147/80 - 147/80)  BP(mean): 102 (28 May 2025 14:04) (102 - 102)  RR: 16 (29 May 2025 08:12) (16 - 17)  SpO2: 98% (29 May 2025 08:12) (97% - 98%)    Parameters below as of 28 May 2025 14:04  Patient On (Oxygen Delivery Method): room air    MEDICATIONS  (STANDING):  chlorhexidine 4% Liquid 1 Application(s) Topical once  sodium chloride 0.9%. 1000 milliLiter(s) (100 mL/Hr) IV Continuous <Continuous>      REVIEW OF SYSTEMS:          All negative except as mentioned in HPI    PHYSICAL EXAM:           CONSTITUTIONAL: Well-developed; well-nourished; in no acute distress  	SKIN: warm, dry  	HEAD: Normocephalic; atraumatic  	EYES: PERRL.  	ENT: No nasal discharge, airway clear, mucous membranes moist  	NECK: Supple; non tender.  	CARD: +S1, +S2, no murmurs, gallops, or rubs. Regular rate and rhythm    	RESP: No wheezes, rales or rhonchi. CTA B/L  	ABD: soft ntnd, + BS x 4 quadrants  	EXT: moves all extremities,  no clubbing, cyanosis or edema  	NEURO: Alert and oriented x3, no focal deficits          PSYCH: Cooperative, appropriate          VASCULAR:  + Rad / + PTs / + DPs          EXTREMITY:            	   Right Radial: D-stat in place, access site soft, no hematoma, no pain, + pulses, no sign of infection, no numbness              LABS:                        13.3   3.23  )-----------( 90       ( 29 May 2025 08:25 )             39.8     05-29    138  |  106  |  15  ----------------------------<  161[H]  4.4   |  20  |  1.0    Ca    9.1      29 May 2025 08:25      A/P:  I discussed the case with Cardiologist Dr. Ibarra  and recommend the following:    S/P PCI:    Intervention: Successful PCI of mid-RCA with balloon angioplasty s/p PETE x 1  Implants: SYNERGY XD 3.0X12MM      FINDINGS:     Coronary Dominance: Right  LM: Mild disease.   LAD: Mild disease. Medium sized.   D1: Mild disease.   CX: Medium to large sized. Minor luminal irregularities.   OM1: Small. Minor luminal irregularities.   OM2: Medium to large sized. Minor luminal irregularities.   RCA: 90% lesion in mid-RCA s/p PCI with PETE.   RPDA: Minor luminal irregularities.     	         Continue DAPT ( Aspirin 81 mg PO Daily and Plavix 75mg po daily), Lisinopril, Imdur, and added Statin Therapy (Rosuvastatin 20mg)                   No B-Blocker due to soft BP                   Patient given 30 day supply of ( Aspirin 81 mg daily and Plavix 75 mg daily ) to take at home                   **GI prophylaxis with Pepcid 20mg (sent to pharmacy)                   CBC @ 14:45 - Hb stable @ 12.4 from 13.3                   EKG prior to d/c @ 14:45 - no acute changes                   NS @ 100 ml/hr x 5 hrs                   **OOB to chair/Ambulate with assistance                   Monitor access site/ distal pulses                   Patient agreeing to take DAPT for at least one year or as directed by cardiologist                    Pt given instructions on importance of taking antiplatelet medication or risk acute stent thrombosis/death                   Post cath instructions, access site care and activity restrictions reviewed with patient                     Discussed with patient to return to hospital if experience chest pain, shortness breath, dizziness and site bleeding                   Aggressive risk factor modification, diet counseling, smoking cessation discussed with patient                    Benefits of cardiac rehab discussed with patient.                   Cardiac Rehab info provided /Referral and communication to cardiac rehab completed.                   Patient instructed to call cardiac rehab and make initial appointment after first follow-up visit with Cardiologist                    Can discharge patient @ 16:45 from cardiac standpoint after ambulating without symptoms, access site wnl, labs and ECG reviewed                    Follow up with Cardiology Dr. Mathur in two weeks.  Instructed to call and make an appointment

## 2025-05-29 NOTE — ASU DISCHARGE PLAN (ADULT/PEDIATRIC) - CARE PROVIDER_API CALL
Dre Mathur  Interventional Cardiology  [address], [address]  [place] [address]  Phone: [phone]  Fax: [phone]  Follow Up Time: 2 weeks    Saúl Palacios  Interventional Cardiology  [address] [place] [address], [address]  [place] [address]  Phone: [phone]  Fax: [phone]  Follow Up Time: 1 week

## 2025-05-29 NOTE — ASU DISCHARGE PLAN (ADULT/PEDIATRIC) - ASU DC SPECIAL INSTRUCTIONSFT
Discharge Instructions as follows:  - Continue medical regimen as prescribed to prevent chest pain.  - If you are diabetic and taking medication containing Metformin, do not take them for 48 hours after the procedure. Resume on 6/1 in AM.  - Continue dual anti-platelet therapy, Imdur, Statin   - Resume Lisinopril tomorrow 5/30  - Instructed to call 911 if chest pain, shortness of breath or bleeding from access site.  - No heavy lifting > 10lbs x 1 week.  - No driving x 24 hours.  - No baths, swimming pools x 1 week, may shower  - Low sodium low fat low cholesterol diet  - Follow-up with Cardiologist in 1-2 weeks after discharge  - Soreness or tenderness at the site is possible, it will diminish over time. You may take Tylenol every 4-6 hours as needed. Nothing stronger is needed. NO Motrin/Ibuprofen  - Any questions call cardiac cath lab [phone]

## 2025-05-29 NOTE — ASU DISCHARGE PLAN (ADULT/PEDIATRIC) - PROVIDER TOKENS
PROVIDER:[TOKEN:[[id number]],FOLLOWUP:[2 weeks]],PROVIDER:[TOKEN:[[id number]],FOLLOWUP:[1 week]]

## 2025-05-29 NOTE — CHART NOTE - NSCHARTNOTEFT_GEN_A_CORE
PROCEDURE:   - Left heart cath  - Intervention     PHYSICIAN: Dr. Ibarra  FELLOW: Dr. Beck    Pre-procedure Diagnosis: Severe AS. Dyspnea on exertion    Consent: Patient   Anesthesia: Sedation | Local     ACCESS & CLOSURE:  6 Fr R Radial Artery; D-stat     IV Contrast: 65 mL      Intervention: Successful PCI of mid-RCA with balloon angioplasty s/p PETE x 1    Implants: SYNERGY XD 3.0X12MM    AUC:      FINDINGS:     Coronary Dominance: Right    LM: Mild disease.     LAD: Mild disease. Medium sized.   D1: Mild disease.     CX: Medium to large sized. Minor luminal irregularities.   OM1: Small. Minor luminal irregularities.   OM2: Medium to large sized. Minor luminal irregularities.     RCA: 90% lesion in mid-RCA s/p PCI with PETE.   RPDA: Minor luminal irregularities.     LVEDP: AV not crossed      ESTIMATED BLOOD LOSS: < 10 mL      CONDITION: Good   SPECIMEN REMOVED: N/A     POST-OP DIAGNOSIS:    - 1 Vessel Coronary Artery Disease; Successful PCI of mid-RCA with balloon angioplasty s/p PETE x 1     PLAN OF CARE:   [X] D/C Home Today   [X] Medications:   - Continue ASA 81 mg PO QD  - Continue Clopidogrel 75 mg PO QD  - High intensity statin  [X] IV Fluids: NS @ 100cc/h x 4 hours  [X] Remove D-stat. Hold manual pressure if signs of hematoma or bleeding over radial access site.

## 2025-05-30 DIAGNOSIS — I25.10 ATHEROSCLEROTIC HEART DISEASE OF NATIVE CORONARY ARTERY WITHOUT ANGINA PECTORIS: ICD-10-CM

## 2025-05-30 DIAGNOSIS — I35.0 NONRHEUMATIC AORTIC (VALVE) STENOSIS: ICD-10-CM

## 2025-06-05 DIAGNOSIS — I35.0 NONRHEUMATIC AORTIC (VALVE) STENOSIS: ICD-10-CM

## 2025-06-06 ENCOUNTER — OUTPATIENT (OUTPATIENT)
Dept: OUTPATIENT SERVICES | Facility: HOSPITAL | Age: 85
LOS: 1 days | End: 2025-06-06
Payer: MEDICARE

## 2025-06-06 ENCOUNTER — RESULT REVIEW (OUTPATIENT)
Age: 85
End: 2025-06-06

## 2025-06-06 DIAGNOSIS — I35.0 NONRHEUMATIC AORTIC (VALVE) STENOSIS: ICD-10-CM

## 2025-06-06 DIAGNOSIS — Z90.49 ACQUIRED ABSENCE OF OTHER SPECIFIED PARTS OF DIGESTIVE TRACT: Chronic | ICD-10-CM

## 2025-06-06 PROCEDURE — 75574 CT ANGIO HRT W/3D IMAGE: CPT | Mod: 26

## 2025-06-06 PROCEDURE — 75574 CT ANGIO HRT W/3D IMAGE: CPT

## 2025-06-06 PROCEDURE — 74174 CTA ABD&PLVS W/CONTRAST: CPT | Mod: 26

## 2025-06-06 PROCEDURE — 74174 CTA ABD&PLVS W/CONTRAST: CPT

## 2025-06-07 DIAGNOSIS — I35.0 NONRHEUMATIC AORTIC (VALVE) STENOSIS: ICD-10-CM

## 2025-06-11 ENCOUNTER — NON-APPOINTMENT (OUTPATIENT)
Age: 85
End: 2025-06-11

## 2025-06-20 ENCOUNTER — APPOINTMENT (OUTPATIENT)
Dept: CARDIOTHORACIC SURGERY | Facility: CLINIC | Age: 85
End: 2025-06-20
Payer: MEDICARE

## 2025-06-20 VITALS
OXYGEN SATURATION: 94 % | HEIGHT: 64 IN | WEIGHT: 204 LBS | BODY MASS INDEX: 34.83 KG/M2 | TEMPERATURE: 98.1 F | DIASTOLIC BLOOD PRESSURE: 66 MMHG | SYSTOLIC BLOOD PRESSURE: 109 MMHG | RESPIRATION RATE: 16 BRPM | HEART RATE: 87 BPM

## 2025-06-20 PROCEDURE — 99214 OFFICE O/P EST MOD 30 MIN: CPT

## 2025-06-20 RX ORDER — ASPIRIN 81 MG/1
81 TABLET, CHEWABLE ORAL
Qty: 90 | Refills: 2 | Status: ACTIVE | COMMUNITY
Start: 2025-06-20 | End: 1900-01-01

## 2025-06-20 RX ORDER — CLOPIDOGREL BISULFATE 75 MG/1
75 TABLET, FILM COATED ORAL DAILY
Qty: 3 | Refills: 1 | Status: ACTIVE | COMMUNITY
Start: 2025-06-20 | End: 1900-01-01

## 2025-06-20 RX ORDER — BENZONATATE 200 MG/1
200 CAPSULE ORAL 3 TIMES DAILY
Qty: 42 | Refills: 0 | Status: ACTIVE | COMMUNITY
Start: 2025-06-20 | End: 1900-01-01

## 2025-06-23 ENCOUNTER — NON-APPOINTMENT (OUTPATIENT)
Age: 85
End: 2025-06-23

## 2025-06-27 ENCOUNTER — APPOINTMENT (OUTPATIENT)
Dept: PULMONOLOGY | Facility: HOSPITAL | Age: 85
End: 2025-06-27

## 2025-06-27 ENCOUNTER — OUTPATIENT (OUTPATIENT)
Dept: OUTPATIENT SERVICES | Facility: HOSPITAL | Age: 85
LOS: 1 days | End: 2025-06-27
Payer: MEDICARE

## 2025-06-27 DIAGNOSIS — R06.02 SHORTNESS OF BREATH: ICD-10-CM

## 2025-06-27 DIAGNOSIS — Z90.49 ACQUIRED ABSENCE OF OTHER SPECIFIED PARTS OF DIGESTIVE TRACT: Chronic | ICD-10-CM

## 2025-06-27 PROCEDURE — 94070 EVALUATION OF WHEEZING: CPT

## 2025-06-27 PROCEDURE — 94726 PLETHYSMOGRAPHY LUNG VOLUMES: CPT

## 2025-06-27 PROCEDURE — 94729 DIFFUSING CAPACITY: CPT | Mod: 26

## 2025-06-27 PROCEDURE — 94729 DIFFUSING CAPACITY: CPT

## 2025-06-27 PROCEDURE — 94664 DEMO&/EVAL PT USE INHALER: CPT

## 2025-06-27 PROCEDURE — 94727 GAS DIL/WSHOT DETER LNG VOL: CPT | Mod: 26

## 2025-06-27 PROCEDURE — 94060 EVALUATION OF WHEEZING: CPT | Mod: 26

## 2025-06-28 DIAGNOSIS — R06.02 SHORTNESS OF BREATH: ICD-10-CM

## 2025-06-29 NOTE — ED PROVIDER NOTE - DISPOSITION TYPE
Request for walker submitted to [employer].  Unable to process as office closed.  Attempted to contact [facility] for walker, office closed.    Spoke to patient and hector RASMUSSEN to follow up with DME in am.    
DISCHARGE

## 2025-07-01 ENCOUNTER — OUTPATIENT (OUTPATIENT)
Dept: OUTPATIENT SERVICES | Facility: HOSPITAL | Age: 85
LOS: 1 days | End: 2025-07-01
Payer: MEDICARE

## 2025-07-01 VITALS
TEMPERATURE: 98 F | DIASTOLIC BLOOD PRESSURE: 64 MMHG | OXYGEN SATURATION: 97 % | WEIGHT: 199.96 LBS | HEART RATE: 86 BPM | SYSTOLIC BLOOD PRESSURE: 106 MMHG | RESPIRATION RATE: 14 BRPM | HEIGHT: 66 IN

## 2025-07-01 DIAGNOSIS — Z90.49 ACQUIRED ABSENCE OF OTHER SPECIFIED PARTS OF DIGESTIVE TRACT: Chronic | ICD-10-CM

## 2025-07-01 DIAGNOSIS — Z01.818 ENCOUNTER FOR OTHER PREPROCEDURAL EXAMINATION: ICD-10-CM

## 2025-07-01 DIAGNOSIS — I35.0 NONRHEUMATIC AORTIC (VALVE) STENOSIS: ICD-10-CM

## 2025-07-01 LAB
A1C WITH ESTIMATED AVERAGE GLUCOSE RESULT: 6.8 % — HIGH (ref 4–5.6)
ALBUMIN SERPL ELPH-MCNC: 4.1 G/DL — SIGNIFICANT CHANGE UP (ref 3.5–5.2)
ALP SERPL-CCNC: 110 U/L — SIGNIFICANT CHANGE UP (ref 30–115)
ALT FLD-CCNC: 57 U/L — HIGH (ref 0–41)
ANION GAP SERPL CALC-SCNC: 13 MMOL/L — SIGNIFICANT CHANGE UP (ref 7–14)
APPEARANCE UR: CLEAR — SIGNIFICANT CHANGE UP
APTT BLD: 32.5 SEC — SIGNIFICANT CHANGE UP (ref 27–39.2)
AST SERPL-CCNC: 67 U/L — HIGH (ref 0–41)
BILIRUB SERPL-MCNC: 0.5 MG/DL — SIGNIFICANT CHANGE UP (ref 0.2–1.2)
BILIRUB UR-MCNC: NEGATIVE — SIGNIFICANT CHANGE UP
BLD GP AB SCN SERPL QL: SIGNIFICANT CHANGE UP
BUN SERPL-MCNC: 15 MG/DL — SIGNIFICANT CHANGE UP (ref 10–20)
CALCIUM SERPL-MCNC: 9.2 MG/DL — SIGNIFICANT CHANGE UP (ref 8.4–10.5)
CHLORIDE SERPL-SCNC: 104 MMOL/L — SIGNIFICANT CHANGE UP (ref 98–110)
CO2 SERPL-SCNC: 21 MMOL/L — SIGNIFICANT CHANGE UP (ref 17–32)
COLOR SPEC: YELLOW — SIGNIFICANT CHANGE UP
CREAT SERPL-MCNC: 0.9 MG/DL — SIGNIFICANT CHANGE UP (ref 0.7–1.5)
DIFF PNL FLD: NEGATIVE — SIGNIFICANT CHANGE UP
EGFR: 84 ML/MIN/1.73M2 — SIGNIFICANT CHANGE UP
EGFR: 84 ML/MIN/1.73M2 — SIGNIFICANT CHANGE UP
ESTIMATED AVERAGE GLUCOSE: 148 MG/DL — HIGH (ref 68–114)
GLUCOSE SERPL-MCNC: 201 MG/DL — HIGH (ref 70–99)
GLUCOSE UR QL: NEGATIVE MG/DL — SIGNIFICANT CHANGE UP
HCT VFR BLD CALC: 32.1 % — LOW (ref 42–52)
HGB BLD-MCNC: 10.8 G/DL — LOW (ref 14–18)
INR BLD: 1.14 RATIO — SIGNIFICANT CHANGE UP (ref 0.65–1.3)
KETONES UR QL: ABNORMAL MG/DL
LEUKOCYTE ESTERASE UR-ACNC: NEGATIVE — SIGNIFICANT CHANGE UP
MCHC RBC-ENTMCNC: 32.2 PG — HIGH (ref 27–31)
MCHC RBC-ENTMCNC: 33.6 G/DL — SIGNIFICANT CHANGE UP (ref 32–37)
MCV RBC AUTO: 95.8 FL — HIGH (ref 80–94)
MRSA PCR RESULT.: NEGATIVE — SIGNIFICANT CHANGE UP
NITRITE UR-MCNC: NEGATIVE — SIGNIFICANT CHANGE UP
NRBC BLD AUTO-RTO: 0 /100 WBCS — SIGNIFICANT CHANGE UP (ref 0–0)
NT-PROBNP SERPL-SCNC: 122 PG/ML — SIGNIFICANT CHANGE UP (ref 0–300)
PH UR: 5.5 — SIGNIFICANT CHANGE UP (ref 5–8)
PLATELET # BLD AUTO: 75 K/UL — LOW (ref 130–400)
PMV BLD: 10.9 FL — HIGH (ref 7.4–10.4)
POTASSIUM SERPL-MCNC: 4.6 MMOL/L — SIGNIFICANT CHANGE UP (ref 3.5–5)
POTASSIUM SERPL-SCNC: 4.6 MMOL/L — SIGNIFICANT CHANGE UP (ref 3.5–5)
PROT SERPL-MCNC: 7 G/DL — SIGNIFICANT CHANGE UP (ref 6–8)
PROT UR-MCNC: NEGATIVE MG/DL — SIGNIFICANT CHANGE UP
PROTHROM AB SERPL-ACNC: 13.5 SEC — HIGH (ref 9.95–12.87)
RBC # BLD: 3.35 M/UL — LOW (ref 4.7–6.1)
RBC # FLD: 13.8 % — SIGNIFICANT CHANGE UP (ref 11.5–14.5)
SODIUM SERPL-SCNC: 138 MMOL/L — SIGNIFICANT CHANGE UP (ref 135–146)
SP GR SPEC: 1.02 — SIGNIFICANT CHANGE UP (ref 1–1.03)
UROBILINOGEN FLD QL: 0.2 MG/DL — SIGNIFICANT CHANGE UP (ref 0.2–1)
WBC # BLD: 2.57 K/UL — LOW (ref 4.8–10.8)
WBC # FLD AUTO: 2.57 K/UL — LOW (ref 4.8–10.8)

## 2025-07-01 PROCEDURE — 87086 URINE CULTURE/COLONY COUNT: CPT

## 2025-07-01 PROCEDURE — 81003 URINALYSIS AUTO W/O SCOPE: CPT

## 2025-07-01 PROCEDURE — 99214 OFFICE O/P EST MOD 30 MIN: CPT | Mod: 25

## 2025-07-01 PROCEDURE — 87640 STAPH A DNA AMP PROBE: CPT

## 2025-07-01 PROCEDURE — 86901 BLOOD TYPING SEROLOGIC RH(D): CPT

## 2025-07-01 PROCEDURE — 85730 THROMBOPLASTIN TIME PARTIAL: CPT

## 2025-07-01 PROCEDURE — 36415 COLL VENOUS BLD VENIPUNCTURE: CPT

## 2025-07-01 PROCEDURE — 87641 MR-STAPH DNA AMP PROBE: CPT

## 2025-07-01 PROCEDURE — 93010 ELECTROCARDIOGRAM REPORT: CPT

## 2025-07-01 PROCEDURE — 93005 ELECTROCARDIOGRAM TRACING: CPT

## 2025-07-01 PROCEDURE — 86850 RBC ANTIBODY SCREEN: CPT

## 2025-07-01 PROCEDURE — 85610 PROTHROMBIN TIME: CPT

## 2025-07-01 PROCEDURE — 80053 COMPREHEN METABOLIC PANEL: CPT

## 2025-07-01 PROCEDURE — 83880 ASSAY OF NATRIURETIC PEPTIDE: CPT

## 2025-07-01 PROCEDURE — 86900 BLOOD TYPING SEROLOGIC ABO: CPT

## 2025-07-01 PROCEDURE — 83036 HEMOGLOBIN GLYCOSYLATED A1C: CPT

## 2025-07-01 PROCEDURE — 85027 COMPLETE CBC AUTOMATED: CPT

## 2025-07-01 RX ORDER — SEMAGLUTIDE 1 MG/.5ML
0 INJECTION, SOLUTION SUBCUTANEOUS
Refills: 0 | DISCHARGE

## 2025-07-01 NOTE — H&P PST ADULT - ADDITIONAL PE
patient did not bring meds and unaware off hand what he takes patient did not bring meds and unaware off hand what he takes  daughter sent list of meds reviewed with her and stopped ones he was not taking and adding the ones he does, gave daughter instructions via text message

## 2025-07-01 NOTE — H&P PST ADULT - HISTORY OF PRESENT ILLNESS
85 year old male here for Tavr states "one day coughing alot and could not breath" Went to emergency rans tests and patient and wife are not aware of what was found. Patient states my memory is not the same after the procedure.  fos- 1   Does get + chest pain + sob  denies uri and uti  Anesthesia Alert  YES--Difficult Airway  NO--History of neck surgery or radiation  NO--Limited ROM of neck  NO--History of Malignant hyperthermia  NO--Personal or family history of Pseudocholinesterase deficiency  NO--Prior Anesthesia Complication- memory and confusion after  NO--Latex Allergy  YES--Loose teeth- upper denture  NO--History of Rheumatoid Arthritis  NO--SANDRA  NO BLEEDING RISK  NO--Other_____  As per patient, this is their complete medical and surgical history, including medications both prescribed or over the counter.  Patient verbalized understanding of instructions and was given the opportunity to ask questions and have them answered.    23.45 points  The higher the score (maximum 58.2), the higher the functional status.  5.62 METs    2 points  RCRI Score  10.1 %  Risk of major cardiac even

## 2025-07-02 ENCOUNTER — OUTPATIENT (OUTPATIENT)
Dept: OUTPATIENT SERVICES | Facility: HOSPITAL | Age: 85
LOS: 1 days | End: 2025-07-02
Payer: MEDICARE

## 2025-07-02 ENCOUNTER — APPOINTMENT (OUTPATIENT)
Dept: ULTRASOUND IMAGING | Facility: HOSPITAL | Age: 85
End: 2025-07-02
Payer: MEDICARE

## 2025-07-02 ENCOUNTER — RESULT REVIEW (OUTPATIENT)
Age: 85
End: 2025-07-02

## 2025-07-02 DIAGNOSIS — I35.0 NONRHEUMATIC AORTIC (VALVE) STENOSIS: ICD-10-CM

## 2025-07-02 DIAGNOSIS — Z90.49 ACQUIRED ABSENCE OF OTHER SPECIFIED PARTS OF DIGESTIVE TRACT: Chronic | ICD-10-CM

## 2025-07-02 DIAGNOSIS — Z01.818 ENCOUNTER FOR OTHER PREPROCEDURAL EXAMINATION: ICD-10-CM

## 2025-07-02 DIAGNOSIS — Z00.8 ENCOUNTER FOR OTHER GENERAL EXAMINATION: ICD-10-CM

## 2025-07-02 LAB
CULTURE RESULTS: NO GROWTH — SIGNIFICANT CHANGE UP
SPECIMEN SOURCE: SIGNIFICANT CHANGE UP

## 2025-07-02 PROCEDURE — 93880 EXTRACRANIAL BILAT STUDY: CPT | Mod: 26

## 2025-07-02 PROCEDURE — 93880 EXTRACRANIAL BILAT STUDY: CPT

## 2025-07-03 DIAGNOSIS — I35.0 NONRHEUMATIC AORTIC (VALVE) STENOSIS: ICD-10-CM

## 2025-07-07 ENCOUNTER — APPOINTMENT (OUTPATIENT)
Dept: CARDIOLOGY | Facility: CLINIC | Age: 85
End: 2025-07-07

## 2025-07-07 ENCOUNTER — RESULT REVIEW (OUTPATIENT)
Age: 85
End: 2025-07-07

## 2025-07-07 PROCEDURE — 71046 X-RAY EXAM CHEST 2 VIEWS: CPT | Mod: 26

## 2025-07-08 ENCOUNTER — OUTPATIENT (OUTPATIENT)
Dept: OUTPATIENT SERVICES | Facility: HOSPITAL | Age: 85
LOS: 1 days | End: 2025-07-08
Payer: MEDICARE

## 2025-07-08 DIAGNOSIS — Z02.9 ENCOUNTER FOR ADMINISTRATIVE EXAMINATIONS, UNSPECIFIED: ICD-10-CM

## 2025-07-08 DIAGNOSIS — Z90.49 ACQUIRED ABSENCE OF OTHER SPECIFIED PARTS OF DIGESTIVE TRACT: Chronic | ICD-10-CM

## 2025-07-08 PROCEDURE — 71046 X-RAY EXAM CHEST 2 VIEWS: CPT

## 2025-07-09 DIAGNOSIS — Z02.9 ENCOUNTER FOR ADMINISTRATIVE EXAMINATIONS, UNSPECIFIED: ICD-10-CM

## 2025-07-11 ENCOUNTER — NON-APPOINTMENT (OUTPATIENT)
Age: 85
End: 2025-07-11

## 2025-07-14 ENCOUNTER — APPOINTMENT (OUTPATIENT)
Dept: CARDIOTHORACIC SURGERY | Facility: CLINIC | Age: 85
End: 2025-07-14
Payer: MEDICARE

## 2025-07-14 ENCOUNTER — APPOINTMENT (OUTPATIENT)
Dept: CARDIOLOGY | Facility: CLINIC | Age: 85
End: 2025-07-14
Payer: MEDICARE

## 2025-07-14 VITALS
BODY MASS INDEX: 35.85 KG/M2 | DIASTOLIC BLOOD PRESSURE: 80 MMHG | WEIGHT: 210 LBS | SYSTOLIC BLOOD PRESSURE: 128 MMHG | HEART RATE: 88 BPM | HEIGHT: 64 IN

## 2025-07-14 PROCEDURE — 99214 OFFICE O/P EST MOD 30 MIN: CPT | Mod: 25

## 2025-07-14 PROCEDURE — 99202 OFFICE O/P NEW SF 15 MIN: CPT | Mod: 93

## 2025-07-14 PROCEDURE — 93000 ELECTROCARDIOGRAM COMPLETE: CPT

## 2025-07-15 ENCOUNTER — TRANSCRIPTION ENCOUNTER (OUTPATIENT)
Age: 85
End: 2025-07-15

## 2025-07-15 ENCOUNTER — RESULT REVIEW (OUTPATIENT)
Age: 85
End: 2025-07-15

## 2025-07-15 ENCOUNTER — INPATIENT (INPATIENT)
Facility: HOSPITAL | Age: 85
LOS: 1 days | Discharge: HOME | DRG: 307 | End: 2025-07-17
Attending: THORACIC SURGERY (CARDIOTHORACIC VASCULAR SURGERY) | Admitting: THORACIC SURGERY (CARDIOTHORACIC VASCULAR SURGERY)
Payer: MEDICARE

## 2025-07-15 ENCOUNTER — APPOINTMENT (OUTPATIENT)
Dept: CARDIOTHORACIC SURGERY | Facility: HOSPITAL | Age: 85
End: 2025-07-15

## 2025-07-15 VITALS
WEIGHT: 210.1 LBS | RESPIRATION RATE: 16 BRPM | OXYGEN SATURATION: 98 % | DIASTOLIC BLOOD PRESSURE: 71 MMHG | SYSTOLIC BLOOD PRESSURE: 141 MMHG | HEIGHT: 67 IN

## 2025-07-15 DIAGNOSIS — I35.0 NONRHEUMATIC AORTIC (VALVE) STENOSIS: ICD-10-CM

## 2025-07-15 DIAGNOSIS — Z90.49 ACQUIRED ABSENCE OF OTHER SPECIFIED PARTS OF DIGESTIVE TRACT: Chronic | ICD-10-CM

## 2025-07-15 LAB
ABO RH CONFIRMATION: SIGNIFICANT CHANGE UP
ALBUMIN SERPL ELPH-MCNC: 3.9 G/DL — SIGNIFICANT CHANGE UP (ref 3.5–5.2)
ALBUMIN SERPL ELPH-MCNC: 4 G/DL — SIGNIFICANT CHANGE UP (ref 3.5–5.2)
ALP SERPL-CCNC: 112 U/L — SIGNIFICANT CHANGE UP (ref 30–115)
ALP SERPL-CCNC: 116 U/L — HIGH (ref 30–115)
ALT FLD-CCNC: 56 U/L — HIGH (ref 0–41)
ALT FLD-CCNC: 56 U/L — HIGH (ref 0–41)
ANION GAP SERPL CALC-SCNC: 12 MMOL/L — SIGNIFICANT CHANGE UP (ref 7–14)
ANION GAP SERPL CALC-SCNC: 9 MMOL/L — SIGNIFICANT CHANGE UP (ref 7–14)
AST SERPL-CCNC: 78 U/L — HIGH (ref 0–41)
AST SERPL-CCNC: 81 U/L — HIGH (ref 0–41)
BILIRUB SERPL-MCNC: 0.4 MG/DL — SIGNIFICANT CHANGE UP (ref 0.2–1.2)
BILIRUB SERPL-MCNC: 0.4 MG/DL — SIGNIFICANT CHANGE UP (ref 0.2–1.2)
BUN SERPL-MCNC: 13 MG/DL — SIGNIFICANT CHANGE UP (ref 10–20)
BUN SERPL-MCNC: 14 MG/DL — SIGNIFICANT CHANGE UP (ref 10–20)
CALCIUM SERPL-MCNC: 8.7 MG/DL — SIGNIFICANT CHANGE UP (ref 8.4–10.5)
CALCIUM SERPL-MCNC: 8.9 MG/DL — SIGNIFICANT CHANGE UP (ref 8.4–10.5)
CHLORIDE SERPL-SCNC: 105 MMOL/L — SIGNIFICANT CHANGE UP (ref 98–110)
CHLORIDE SERPL-SCNC: 105 MMOL/L — SIGNIFICANT CHANGE UP (ref 98–110)
CO2 SERPL-SCNC: 21 MMOL/L — SIGNIFICANT CHANGE UP (ref 17–32)
CO2 SERPL-SCNC: 21 MMOL/L — SIGNIFICANT CHANGE UP (ref 17–32)
CREAT SERPL-MCNC: 0.8 MG/DL — SIGNIFICANT CHANGE UP (ref 0.7–1.5)
CREAT SERPL-MCNC: 0.9 MG/DL — SIGNIFICANT CHANGE UP (ref 0.7–1.5)
EGFR: 84 ML/MIN/1.73M2 — SIGNIFICANT CHANGE UP
EGFR: 84 ML/MIN/1.73M2 — SIGNIFICANT CHANGE UP
EGFR: 87 ML/MIN/1.73M2 — SIGNIFICANT CHANGE UP
EGFR: 87 ML/MIN/1.73M2 — SIGNIFICANT CHANGE UP
GLUCOSE BLDC GLUCOMTR-MCNC: 116 MG/DL — HIGH (ref 70–99)
GLUCOSE BLDC GLUCOMTR-MCNC: 158 MG/DL — HIGH (ref 70–99)
GLUCOSE BLDC GLUCOMTR-MCNC: 172 MG/DL — HIGH (ref 70–99)
GLUCOSE BLDC GLUCOMTR-MCNC: 298 MG/DL — HIGH (ref 70–99)
GLUCOSE SERPL-MCNC: 155 MG/DL — HIGH (ref 70–99)
GLUCOSE SERPL-MCNC: 171 MG/DL — HIGH (ref 70–99)
HCT VFR BLD CALC: 31.8 % — LOW (ref 42–52)
HGB BLD-MCNC: 10.4 G/DL — LOW (ref 14–18)
MAGNESIUM SERPL-MCNC: 1.7 MG/DL — LOW (ref 1.8–2.4)
MCHC RBC-ENTMCNC: 31 PG — SIGNIFICANT CHANGE UP (ref 27–31)
MCHC RBC-ENTMCNC: 32.7 G/DL — SIGNIFICANT CHANGE UP (ref 32–37)
MCV RBC AUTO: 94.9 FL — HIGH (ref 80–94)
NRBC BLD AUTO-RTO: 0 /100 WBCS — SIGNIFICANT CHANGE UP (ref 0–0)
NT-PROBNP SERPL-SCNC: 107 PG/ML — SIGNIFICANT CHANGE UP (ref 0–300)
PLATELET # BLD AUTO: 84 K/UL — LOW (ref 130–400)
PMV BLD: 11 FL — HIGH (ref 7.4–10.4)
POTASSIUM SERPL-MCNC: 4.7 MMOL/L — SIGNIFICANT CHANGE UP (ref 3.5–5)
POTASSIUM SERPL-MCNC: 4.7 MMOL/L — SIGNIFICANT CHANGE UP (ref 3.5–5)
POTASSIUM SERPL-SCNC: 4.7 MMOL/L — SIGNIFICANT CHANGE UP (ref 3.5–5)
POTASSIUM SERPL-SCNC: 4.7 MMOL/L — SIGNIFICANT CHANGE UP (ref 3.5–5)
PROT SERPL-MCNC: 6.9 G/DL — SIGNIFICANT CHANGE UP (ref 6–8)
PROT SERPL-MCNC: 7 G/DL — SIGNIFICANT CHANGE UP (ref 6–8)
RBC # BLD: 3.35 M/UL — LOW (ref 4.7–6.1)
RBC # FLD: 13.1 % — SIGNIFICANT CHANGE UP (ref 11.5–14.5)
SODIUM SERPL-SCNC: 135 MMOL/L — SIGNIFICANT CHANGE UP (ref 135–146)
SODIUM SERPL-SCNC: 138 MMOL/L — SIGNIFICANT CHANGE UP (ref 135–146)
WBC # BLD: 2.71 K/UL — LOW (ref 4.8–10.8)
WBC # FLD AUTO: 2.71 K/UL — LOW (ref 4.8–10.8)

## 2025-07-15 PROCEDURE — 33361 REPLACE AORTIC VALVE PERQ: CPT | Mod: Q0

## 2025-07-15 PROCEDURE — 36415 COLL VENOUS BLD VENIPUNCTURE: CPT

## 2025-07-15 PROCEDURE — 85730 THROMBOPLASTIN TIME PARTIAL: CPT

## 2025-07-15 PROCEDURE — 43239 EGD BIOPSY SINGLE/MULTIPLE: CPT

## 2025-07-15 PROCEDURE — 86923 COMPATIBILITY TEST ELECTRIC: CPT

## 2025-07-15 PROCEDURE — C1889: CPT

## 2025-07-15 PROCEDURE — 85018 HEMOGLOBIN: CPT

## 2025-07-15 PROCEDURE — 84100 ASSAY OF PHOSPHORUS: CPT

## 2025-07-15 PROCEDURE — 88305 TISSUE EXAM BY PATHOLOGIST: CPT | Mod: 26

## 2025-07-15 PROCEDURE — 83880 ASSAY OF NATRIURETIC PEPTIDE: CPT

## 2025-07-15 PROCEDURE — 84295 ASSAY OF SERUM SODIUM: CPT

## 2025-07-15 PROCEDURE — 88312 SPECIAL STAINS GROUP 1: CPT

## 2025-07-15 PROCEDURE — 83735 ASSAY OF MAGNESIUM: CPT

## 2025-07-15 PROCEDURE — 99291 CRITICAL CARE FIRST HOUR: CPT

## 2025-07-15 PROCEDURE — C1894: CPT

## 2025-07-15 PROCEDURE — 84132 ASSAY OF SERUM POTASSIUM: CPT

## 2025-07-15 PROCEDURE — 71045 X-RAY EXAM CHEST 1 VIEW: CPT

## 2025-07-15 PROCEDURE — 88305 TISSUE EXAM BY PATHOLOGIST: CPT

## 2025-07-15 PROCEDURE — 82330 ASSAY OF CALCIUM: CPT

## 2025-07-15 PROCEDURE — 82803 BLOOD GASES ANY COMBINATION: CPT

## 2025-07-15 PROCEDURE — C1887: CPT

## 2025-07-15 PROCEDURE — 85027 COMPLETE CBC AUTOMATED: CPT

## 2025-07-15 PROCEDURE — 94010 BREATHING CAPACITY TEST: CPT

## 2025-07-15 PROCEDURE — 82962 GLUCOSE BLOOD TEST: CPT

## 2025-07-15 PROCEDURE — 99223 1ST HOSP IP/OBS HIGH 75: CPT | Mod: 25

## 2025-07-15 PROCEDURE — 85025 COMPLETE CBC W/AUTO DIFF WBC: CPT

## 2025-07-15 PROCEDURE — 80053 COMPREHEN METABOLIC PANEL: CPT

## 2025-07-15 PROCEDURE — 85014 HEMATOCRIT: CPT

## 2025-07-15 PROCEDURE — 88312 SPECIAL STAINS GROUP 1: CPT | Mod: 26

## 2025-07-15 PROCEDURE — C1769: CPT

## 2025-07-15 PROCEDURE — 97161 PT EVAL LOW COMPLEX 20 MIN: CPT | Mod: GP

## 2025-07-15 PROCEDURE — 93306 TTE W/DOPPLER COMPLETE: CPT

## 2025-07-15 PROCEDURE — 93005 ELECTROCARDIOGRAM TRACING: CPT

## 2025-07-15 PROCEDURE — C1760: CPT

## 2025-07-15 PROCEDURE — 83605 ASSAY OF LACTIC ACID: CPT

## 2025-07-15 PROCEDURE — 85610 PROTHROMBIN TIME: CPT

## 2025-07-15 RX ORDER — SODIUM CHLORIDE 9 G/1000ML
1000 INJECTION, SOLUTION INTRAVENOUS
Refills: 0 | Status: DISCONTINUED | OUTPATIENT
Start: 2025-07-15 | End: 2025-07-17

## 2025-07-15 RX ORDER — DEXTROSE 50 % IN WATER 50 %
15 SYRINGE (ML) INTRAVENOUS ONCE
Refills: 0 | Status: DISCONTINUED | OUTPATIENT
Start: 2025-07-15 | End: 2025-07-17

## 2025-07-15 RX ORDER — INSULIN GLARGINE-YFGN 100 [IU]/ML
0 INJECTION, SOLUTION SUBCUTANEOUS
Refills: 0 | DISCHARGE

## 2025-07-15 RX ORDER — BENZONATATE 100 MG
1 CAPSULE ORAL
Refills: 0 | DISCHARGE

## 2025-07-15 RX ORDER — TAMSULOSIN HYDROCHLORIDE 0.4 MG/1
0.4 CAPSULE ORAL AT BEDTIME
Refills: 0 | Status: DISCONTINUED | OUTPATIENT
Start: 2025-07-15 | End: 2025-07-17

## 2025-07-15 RX ORDER — GLUCAGON 3 MG/1
1 POWDER NASAL ONCE
Refills: 0 | Status: DISCONTINUED | OUTPATIENT
Start: 2025-07-15 | End: 2025-07-17

## 2025-07-15 RX ORDER — DEXTROSE 50 % IN WATER 50 %
12.5 SYRINGE (ML) INTRAVENOUS ONCE
Refills: 0 | Status: DISCONTINUED | OUTPATIENT
Start: 2025-07-15 | End: 2025-07-17

## 2025-07-15 RX ORDER — ASPIRIN 325 MG
81 TABLET ORAL ONCE
Refills: 0 | Status: COMPLETED | OUTPATIENT
Start: 2025-07-15 | End: 2025-07-15

## 2025-07-15 RX ORDER — CLOPIDOGREL BISULFATE 75 MG/1
75 TABLET, FILM COATED ORAL ONCE
Refills: 0 | Status: COMPLETED | OUTPATIENT
Start: 2025-07-15 | End: 2025-07-15

## 2025-07-15 RX ORDER — CLOPIDOGREL BISULFATE 75 MG/1
75 TABLET, FILM COATED ORAL DAILY
Refills: 0 | Status: DISCONTINUED | OUTPATIENT
Start: 2025-07-15 | End: 2025-07-17

## 2025-07-15 RX ORDER — DEXTROSE 50 % IN WATER 50 %
25 SYRINGE (ML) INTRAVENOUS ONCE
Refills: 0 | Status: DISCONTINUED | OUTPATIENT
Start: 2025-07-15 | End: 2025-07-17

## 2025-07-15 RX ORDER — EZETIMIBE 10 MG/1
1 TABLET ORAL
Refills: 0 | DISCHARGE

## 2025-07-15 RX ORDER — ASPIRIN 325 MG
1 TABLET ORAL
Refills: 0 | DISCHARGE

## 2025-07-15 RX ORDER — INSULIN LISPRO 100 U/ML
INJECTION, SOLUTION INTRAVENOUS; SUBCUTANEOUS AT BEDTIME
Refills: 0 | Status: DISCONTINUED | OUTPATIENT
Start: 2025-07-15 | End: 2025-07-17

## 2025-07-15 RX ORDER — HEPARIN SODIUM 1000 [USP'U]/ML
5000 INJECTION INTRAVENOUS; SUBCUTANEOUS EVERY 8 HOURS
Refills: 0 | Status: DISCONTINUED | OUTPATIENT
Start: 2025-07-15 | End: 2025-07-16

## 2025-07-15 RX ORDER — SEMAGLUTIDE 1 MG/.5ML
0.5 INJECTION, SOLUTION SUBCUTANEOUS
Refills: 0 | DISCHARGE

## 2025-07-15 RX ORDER — ASPIRIN 325 MG
81 TABLET ORAL DAILY
Refills: 0 | Status: DISCONTINUED | OUTPATIENT
Start: 2025-07-15 | End: 2025-07-17

## 2025-07-15 RX ORDER — INSULIN LISPRO 100 U/ML
INJECTION, SOLUTION INTRAVENOUS; SUBCUTANEOUS
Refills: 0 | Status: DISCONTINUED | OUTPATIENT
Start: 2025-07-15 | End: 2025-07-17

## 2025-07-15 RX ORDER — ROSUVASTATIN CALCIUM 5 MG/1
1 TABLET, FILM COATED ORAL
Refills: 0 | DISCHARGE

## 2025-07-15 RX ADMIN — Medication 1 APPLICATION(S): at 20:30

## 2025-07-15 RX ADMIN — CLOPIDOGREL BISULFATE 75 MILLIGRAM(S): 75 TABLET, FILM COATED ORAL at 21:26

## 2025-07-15 RX ADMIN — Medication 3 MILLILITER(S): at 22:00

## 2025-07-15 RX ADMIN — HEPARIN SODIUM 5000 UNIT(S): 1000 INJECTION INTRAVENOUS; SUBCUTANEOUS at 21:28

## 2025-07-15 RX ADMIN — Medication 40 MILLIGRAM(S): at 21:26

## 2025-07-15 RX ADMIN — INSULIN LISPRO 1: 100 INJECTION, SOLUTION INTRAVENOUS; SUBCUTANEOUS at 17:00

## 2025-07-15 RX ADMIN — TAMSULOSIN HYDROCHLORIDE 0.4 MILLIGRAM(S): 0.4 CAPSULE ORAL at 21:26

## 2025-07-15 RX ADMIN — CLOPIDOGREL BISULFATE 75 MILLIGRAM(S): 75 TABLET, FILM COATED ORAL at 09:35

## 2025-07-15 RX ADMIN — INSULIN LISPRO 1: 100 INJECTION, SOLUTION INTRAVENOUS; SUBCUTANEOUS at 21:29

## 2025-07-15 RX ADMIN — Medication 81 MILLIGRAM(S): at 21:27

## 2025-07-15 RX ADMIN — Medication 81 MILLIGRAM(S): at 09:35

## 2025-07-15 NOTE — CHART NOTE - NSCHARTNOTEFT_GEN_A_CORE
Labs reviewed.  Chronic mildly elevated transaminase and mild thrombocytopenia (macrocytic anemia) dating back approximately a decade.  Cirrhosis incidentally noted on CT.  Normal AST, INR, bilirubin.    Discussed with patient.  Herbert reports longstanding liver disease dating back to  service in [place] (malaria / schistosomiasis hepatitis).  He apparently has been evaluated by gastroenterologist, but no recent follow-up.  No history of GI bleeding or recent decompensated cirrhosis.  Tolerating DAPT s/p recent stent with stable platelet count.    Given longstanding clinical stability, will likely proceed with TAVR, however, hepatology consultation requested to evaluate for esophageal varices in event SHAHID required during TAVR, and for long-term management.

## 2025-07-15 NOTE — PROGRESS NOTE ADULT - SUBJECTIVE AND OBJECTIVE BOX
CTU Attending Progress Daily Note       Procedure: preop tavr  Procedure Day:    HPI:  Mr. MARKUS OSULLIVAN is 85 year old male with PMH of HTN, DLD, CAD with recent pci stent, and hx of aortic stenosis, presented with worsening shortness of breath and chest pain and was found to have progression of his aortic stenosis, NYHA Class II. On 7/15/2025, patient underwent elective TAVR. Prior to TAVR, labs were reviewed.  Chronic mildly elevated transaminase and mild thrombocytopenia (macrocytic anemia) dating back approximately a decade.  Cirrhosis incidentally noted on CT.  Normal AST, INR, bilirubin. Due to longstanding liver disease without recent GI follow up, hepatology was consulted to evaluate for esophageal varices in event SHAHID is requiring during TAVR procedure. TAVR procedure is on hold, however pt is being admitted ro endoscopy and GI work up.       (15 Jul 2025 13:06)      Hospital Course:  7/15 EGD - no varices    OBJECTIVE:  ICU Vital Signs Last 24 Hrs  T(C): 36.4 (15 Jul 2025 16:00), Max: 36.4 (15 Jul 2025 16:00)  T(F): 97.6 (15 Jul 2025 16:00), Max: 97.6 (15 Jul 2025 16:00)  HR: 84 (15 Jul 2025 18:00) (70 - 84)  BP: 126/63 (15 Jul 2025 18:00) (121/63 - 160/76)  BP(mean): 88 (15 Jul 2025 18:00) (88 - 109)  ABP: --  ABP(mean): --  RR: 21 (15 Jul 2025 18:00) (16 - 21)  SpO2: 98% (15 Jul 2025 18:00) (94% - 99%)    O2 Parameters below as of 15 Jul 2025 18:00  Patient On (Oxygen Delivery Method): room air          I&O's Summary    15 Jul 2025 07:01  -  15 Jul 2025 18:24  --------------------------------------------------------  IN: 150 mL / OUT: 300 mL / NET: -150 mL      I&O's Detail    15 Jul 2025 07:01  -  15 Jul 2025 18:24  --------------------------------------------------------  IN:    Oral Fluid: 150 mL  Total IN: 150 mL    OUT:    Voided (mL): 300 mL  Total OUT: 300 mL    Total NET: -150 mL        Adult Advanced Hemodynamics Last 24 Hrs  CVP(mm Hg): --  CVP(cm H2O): --  CO: --  CI: --  PA: --  PA(mean): --  PCWP: --  SVR: --  SVRI: --  PVR: --  PVRI: --    CAPILLARY BLOOD GLUCOSE      POCT Blood Glucose.: 172 mg/dL (15 Jul 2025 16:12)  POCT Blood Glucose.: 116 mg/dL (15 Jul 2025 12:51)  POCT Blood Glucose.: 158 mg/dL (15 Jul 2025 08:54)    LABS:                          10.4   2.71  )-----------( 84       ( 15 Jul 2025 10:50 )             31.8     07-15    135  |  105  |  13  ----------------------------<  171[H]  4.7   |  21  |  0.8    Ca    8.7      15 Jul 2025 16:21  Mg     1.7     07-15    TPro  6.9  /  Alb  3.9  /  TBili  0.4  /  DBili  x   /  AST  78[H]  /  ALT  56[H]  /  AlkPhos  116[H]  07-15      Urinalysis Basic - ( 15 Jul 2025 16:21 )    Color: x / Appearance: x / SG: x / pH: x  Gluc: 171 mg/dL / Ketone: x  / Bili: x / Urobili: x   Blood: x / Protein: x / Nitrite: x   Leuk Esterase: x / RBC: x / WBC x   Sq Epi: x / Non Sq Epi: x / Bacteria: x        Home Medications:  aspirin 81 mg oral capsule: 1 cap(s) orally (15 Jul 2025 09:12)  benzonatate 200 mg oral capsule: 1 cap(s) orally (15 Jul 2025 09:12)  ezetimibe 10 mg oral tablet: 1 tab(s) orally (15 Jul 2025 09:12)  Flomax 0.4 mg oral capsule: 1 cap(s) orally once a day (15 Jul 2025 09:12)  Insulin Glargine:  (15 Jul 2025 09:12)  isosorbide mononitrate 30 mg oral tablet, extended release: 1 tab(s) orally once a day (in the morning) (15 Jul 2025 09:12)  Ozempic 2 mg/1.5 mL (0.25 mg or 0.5 mg dose) subcutaneous solution: 0.5 milligram(s) subcutaneously (15 Jul 2025 09:12)  Plavix 75 mg oral tablet: 1 tab(s) orally (15 Jul 2025 09:15)  rosuvastatin 20 mg oral capsule: 1 cap(s) orally once a day (at bedtime) (15 Jul 2025 09:15)  SITagliptin 100 mg oral tablet: 1 tab(s) orally once a day (15 Jul 2025 09:12)    HOSPITAL MEDICATIONS:  MEDICATIONS  (STANDING):  aspirin  chewable 81 milliGRAM(s) Oral daily  clopidogrel Tablet 75 milliGRAM(s) Oral daily  dextrose 5%. 1000 milliLiter(s) (100 mL/Hr) IV Continuous <Continuous>  dextrose 5%. 1000 milliLiter(s) (50 mL/Hr) IV Continuous <Continuous>  dextrose 50% Injectable 25 Gram(s) IV Push once  dextrose 50% Injectable 12.5 Gram(s) IV Push once  dextrose 50% Injectable 25 Gram(s) IV Push once  glucagon  Injectable 1 milliGRAM(s) IntraMuscular once  heparin   Injectable 5000 Unit(s) SubCutaneous every 8 hours  insulin lispro (ADMELOG) corrective regimen sliding scale   SubCutaneous three times a day before meals  insulin lispro (ADMELOG) corrective regimen sliding scale   SubCutaneous at bedtime  sodium chloride 0.9% lock flush 3 milliLiter(s) IV Push every 8 hours  tamsulosin 0.4 milliGRAM(s) Oral at bedtime    MEDICATIONS  (PRN):  dextrose Oral Gel 15 Gram(s) Oral once PRN Blood Glucose LESS THAN 70 milliGRAM(s)/deciliter    RADIOLOGY:  Chest X-ray Reviewed    REVIEW OF SYSTEMS:  CONSTITUTIONAL: [X] all negative; [ ] weakness, [ ] fevers, [ ] chills  EYES/ENT: [X] all negative; [ ] visual changes, [ ] vertigo, [ ] throat pain   NECK: [X] all negative; [ ] pain, [ ] stiffness  RESPIRATORY: [] all negative, [ ] cough, [ ] wheezing, [ ] hemoptysis, [ ] shortness of breath  CARDIOVASCULAR: [] all negative; [ ] chest pain, [ ] palpitations, [ ] orthopnea  GASTROINTESTINAL: [X] all negative; [ ]abdominal pain, [ ] nausea, [ ] vomiting, [ ] hematemesis, [ ] diarrhea, [ ] constipation, [ ] melena, [ ] hematochezia.  GENITOURINARY: [X] all negative; [ ] dysuria, [ ] frequency, [ ] hematuria  NEUROLOGICAL: [X] all negative; [ ] numbness, [ ] weakness  SKIN: [X] all negative; [ ] itching, [ ] burning, [ ] rashes, [ ] lesions   All other review of systems is negative unless indicated above.  [  ] Unable to assess ROS because       PHYSICAL EXAM:          CONSTITUTIONAL: Well-developed; well-nourished; in no acute distress.   	SKIN: warm, dry  	HEAD: Normocephalic; atraumatic.  	EYES: PERRL, EOM, no conj injection, sclera clear  	ENT: No nasal discharge; airway clear.  	NECK: Supple; non tender.   	CARD: S1, S2 normal; no murmurs, gallops, or rubs. Regular rate and rhythm.  no carotid bruits  	RESP: CTA B/L; good air movement No wheezes, rales or rhonchi.  	ABD: Soft, not tender, not distended,  no rebound or guarding, bowel sounds present  	EXT: Normal ROM.  No clubbing, cyanosis or edema.   warm and well perfused.  pulses palpable  	NEURO: Alert, awake, motor 5/5 R, 5/5 L

## 2025-07-15 NOTE — H&P ADULT - NSHPLABSRESULTS_GEN_ALL_CORE
LABS:                        10.4   2.71  )-----------( 84       ( 15 Jul 2025 10:50 )             31.8     07-15    138  |  105  |  14  ----------------------------<  155[H]  4.7   |  21  |  0.9    Ca    8.9      15 Jul 2025 10:50  Mg     1.7     07-15    TPro  7.0  /  Alb  4.0  /  TBili  0.4  /  DBili  x   /  AST  81[H]  /  ALT  56[H]  /  AlkPhos  112  07-15      Urinalysis Basic - ( 15 Jul 2025 10:50 )    Color: x / Appearance: x / SG: x / pH: x  Gluc: 155 mg/dL / Ketone: x  / Bili: x / Urobili: x   Blood: x / Protein: x / Nitrite: x   Leuk Esterase: x / RBC: x / WBC x   Sq Epi: x / Non Sq Epi: x / Bacteria: x

## 2025-07-15 NOTE — CONSULT NOTE ADULT - ASSESSMENT
85 year old male with PMH of HTN, DLD, CAD with recent pci stent, and hx of aortic stenosis, presented with worsening shortness of breath and chest pain and was found to have progression of his aortic stenosis.       # Cirrhosis  # Metabolic syndrome *BMI 32.9 / DMT2/ HLD *   # Hx of schistosomiasis?  # Hx of Malaria?   # Thrombocytopenia  # Macrocytic anemia  # CAD s/p PCI on Plavix  # AS pending TAVR   - INR 1.14   - AST/ ALT 81/56   - CT noted with liver cirrhosis/ splenomegaly  - On Plavix for recent PCI   - Social alcohol drinking       Plan   - Cardio requesting EGD to r/o esophageal varices prior to scheduling TAVR   - Keep NPO for EGD today  - Discussed with pt and his family at bedside that pt is high risk for cardiopulmonary events given his underlying cardiac condition. Pt and family aware and agreeable  - please send HAV, HBVsAb, HBVsAg, HBVcAb, HCV Ab, HCV RNA for viral etiologies  - please send INGRIS, anti-mitochondrial antibody, anti-smooth muscle antibody, anti-liver kidney antibody, immunoglobulins (IgG, IgM, IgA quantitative) for autoimmune etiologies   85 year old male with PMH of HTN, DLD, CAD with recent pci stent, and hx of aortic stenosis, presented with worsening shortness of breath and chest pain and was found to have progression of his aortic stenosis.       # Cirrhosis  # Metabolic syndrome *BMI 32.9 / DMT2/ HLD *   # Hx of schistosomiasis?  # Hx of Malaria?   # Thrombocytopenia  # Macrocytic anemia  # CAD s/p PCI on Plavix  # AS pending TAVR   - INR 1.14   - AST/ ALT 81/56   - CT noted with liver cirrhosis/ splenomegaly  - On Plavix for recent PCI   - Social alcohol drinking       Plan   - Cardio requesting EGD to r/o esophageal varices prior to scheduling TAVR   - Keep NPO for EGD today  - Discussed with pt and his family at bedside that pt is high risk for cardiopulmonary events given his underlying cardiac condition. Pt and family aware and agreeable  - please send HAV, HBVsAb, HBVsAg, HBVcAb, HCV Ab, HCV RNA for viral etiologies  - please send INGRIS, anti-mitochondrial antibody, anti-smooth muscle antibody, anti-liver kidney antibody, immunoglobulins (IgG, IgM, IgA quantitative) for autoimmune etiologies   - US abdomen to check for ascites. If pocket available please perform paracentesis.   - Rest of recs pending EGD results

## 2025-07-15 NOTE — H&P ADULT - NSHPPHYSICALEXAM_GEN_ALL_CORE
Vital Signs Last 24 Hrs  T(C): 36.3 (15 Jul 2025 13:04), Max: 36.3 (15 Jul 2025 12:44)  T(F): 97.4 (15 Jul 2025 12:44), Max: 97.4 (15 Jul 2025 12:44)  HR: 78 (15 Jul 2025 13:04) (78 - 78)  BP: 151/78 (15 Jul 2025 13:04) (141/71 - 151/78)  RR: 16 (15 Jul 2025 13:04) (16 - 16)  SpO2: 99% (15 Jul 2025 13:04) (98% - 99%)    Parameters below as of 15 Jul 2025 12:44  Patient On (Oxygen Delivery Method): room air      CONSTITUTIONAL:  WNL[ x]   Neuro: WNL [x ] Normal exam oriented to person/place & time with no focal motor or sensory  deficits. Other                     Eyes:    WNL [x ] Normal exam of conjunctiva & lids, pupils equally reactive. Other     ENT:     WNL [ x] Normal exam of nasal/oral mucosa with absence of cyanosis. Other  Neck:   WNL [ x] Normal exam of jugular veins, trachea & thyroid. Other  Chest:  WNL [ x] Normal lung exam with good air movement absence of wheezes, rales, or rhonchi: Other                                                                                CV:  Auscultation: normal [ x] S3[ ] S4[ ] Irregular [ ] Rub[ ] Clicks[ ]    Murmurs none:[ x]systolic [ ]  diastolic [ ] holosystolic [ ]  Carotids: No Bruits[ ] Other                  Abdominal Aorta: normal [ x] nonpalpable[ ]Other                                                                                      GI: WNL[ x] Normal exam of abdomen, liver & spleen with no noted masses or tenderness. Other                                                                                                        Extremities: WNL[x ] Normal no evidence of cyanosis or deformity Edema: none[ x]trace[ ]1+[ ]2+[ ]3+[ ]4+[ ]  Lower Extremity Pulses: Right[ ] Left[ ]Varicosities[ ]  SKIN :WNL[x ] Normal exam to inspection & palpation. Other:

## 2025-07-15 NOTE — PROGRESS NOTE ADULT - ASSESSMENT
Preop TAVR    NPO post MN  Hold BB  Monitor rhythm  Flomax  Bowel regimen  PUD ppx    Upon my evaluation, the above patient has a high probability of imminent or life threatening deterioration due to a high risk for Shock, Cardiogenic shock, arrythmia, acute blood loss, acute kidney injury and acute pulmonary insufficiency which required my direct attention, intervention, and personal management.  Total critical care time indicated in the note includes review of radiology results, ordering, interpreting and reviewing diagnostic studies / lab tests with immediate assessment and treatment, consultant collaboration on findings and treatment options, monitoring for potential decompensation, obtaining history from family, EMT, nursing home staff and/or treating physicians; directing the titration of pressors, oxygen support devices, fluid resuscitation, interpretation of cardiac output measurements, interpretation of radiological assessments, interpretation of EKG / rhythm strips, telemetry.  This time did not overlap with the management of other patients or performing separately reportable procedures.      Management of this patient was discussed with the CT Surgery/Thoracic surgery/Structural Cardiology attending and mid-level providers.    I acknowledge the use of copied documentation.  All copy forward documentation is my own and has been reviewed and revised as appropriate.  If no changes were made, it is because that information remains the same.

## 2025-07-15 NOTE — DISCHARGE NOTE PROVIDER - CARE PROVIDER_API CALL
Beto Flores ()  Nurse Practitioner [address], [address]  [place] [address]  Phone: [phone]  Fax: [phone]  Scheduled Appointment: 07/25/2025 10:30 AM    Iris Stroud ()  Clinical Cardiac Electrophysiology  [address] [place] [address], [address]  [place] [address]  Phone: [phone]  Fax: [phone]  Scheduled Appointment: 08/21/2025 02:30 PM

## 2025-07-15 NOTE — CHART NOTE - NSCHARTNOTEFT_GEN_A_CORE
PACU ANESTHESIA ADMISSION NOTE      Procedure:   Post op diagnosis:      ____  Intubated  TV:______       Rate: ______      FiO2: ______    __x__  Patent Airway    __x__  Full return of protective reflexes    __x__  Full recovery from anesthesia / back to baseline status    Vitals  HR: 72  BP:121/63  RR:20  O2 Sat:97  Temp:97.1    Mental Status:  __x__ Awake   ___x__ Alert   _____ Drowsy   _____ Sedated    Nausea/Vomiting:  __x__ NO  ______Yes,   See Post - Op Orders          Pain Scale (0-10):  _____    Treatment: ____ None    __x__ See Post - Op/PCA Orders    Post - Operative Fluids:   ____ Oral   __x__ See Post - Op Orders    Plan: Discharge when criteria met:   ____Home       _____Floor     _____Critical Care   Other:_________________    Comments: Patient had smooth intraoperative event, no anesthesia complication.

## 2025-07-15 NOTE — DISCHARGE NOTE PROVIDER - PROVIDER TOKENS
PROVIDER:[TOKEN:[[id number]],SCHEDULEDAPPT:[07/25/2025],SCHEDULEDAPPTTIME:[10:30 AM]],PROVIDER:[TOKEN:[[id number]],SCHEDULEDAPPT:[08/21/2025],SCHEDULEDAPPTTIME:[02:30 PM]]

## 2025-07-15 NOTE — H&P ADULT - NSHPREVIEWOFSYSTEMS_GEN_ALL_CORE
Review of Systems  CONSTITUTIONAL:  Fevers[ ] chills[ ] sweats[ ] fatigue[ ] weight loss[ ] weight gain [ ]                                     NEGATIVE [X ]   NEURO:  paresthesias[ ] seizures [ ]  syncope [ ]  confusion [ ]                                                                                NEGATIVE[ X]   EYES: glasses[ ]  blurry vision[ ]  discharge[ ] pain[ ] glaucoma [ ]                                                                          NEGATIVE[X ]   ENMT:  difficulty hearing [ ]  vertigo[ ]  dysphagia[ ] epistaxis[ ] recent dental work [ ]                                    NEGATIVE[ X]   CV:  chest pain[ ] palpitations[ ] MEDRANO [ ] diaphoresis [ ]                                                                                           NEGATIVE[ X]   RESPIRATORY:  wheezing[ ] SOB[ ] cough [ ] sputum[ ] hemoptysis[ ]                                                                  NEGATIVE[ ]   GI:  nausea[ ]  vomiting [ ]  diarrhea[ ] constipation [ ] melena [ ]                                                                         NEGATIVE[ X]   : hematuria[ ]  dysuria[ ] urgency[ ] incontinence[ ]                                                                                            NEGATIVE[ X]   MUSKULOSKELETAL:  arthritis[ ]  joint swelling [ ] muscle weakness [ ] Hx vein stripping [ ]                             NEGATIVE[X ]   SKIN/BREAST:  rash[ ] itching [ ]  hair loss[ ] masses[ ]                                                                                              NEGATIVE[ X]   PSYCH:  dementia [ ] depression [ ] anxiety[ ]                                                                                                               NEGATIVE[X ]   HEME/LYMPH:  bruises easily[ ] enlarged lymph nodes[ ] tender lymph nodes[ ]                                               NEGATIVE[ X]   ENDOCRINE:  cold intolerance[ ] heat intolerance[ ] polydipsia[ ]                                                                          NEGATIVE[ X]

## 2025-07-15 NOTE — DISCHARGE NOTE PROVIDER - NSDCFUADDINST_GEN_ALL_CORE_FT
Activities/Restrictions  1.	Do not – drive, lift, pull or push anything over 10 pounds for 2 weeks.  2.	Shower every night and carefully wash wounds, pat dry.  Cover if wound is draining with dry sterile dressing. No baths or swimming for two months.   3.	Do progressive walking exercises every day, gradually increasing to 30 to 40 minutes/day, five days a week and incentive spirometer 10 times every hour while awake  5.	DO NOT DRIVE OR CONSUME ALCOHOL WHILE TAKING PAIN MEDICATION.  Contact your Physician promptly if:  1.	Signs of wound infection, such as increasing redness, swelling, pain or drainage from incisions.  2.	Progressive shortness of breath or increasing difficulty with breathing when lying down.  3.	Excessive nausea, vomiting, diarrhea or coughing.  4.	Increasing swelling, pain or bruising in the groins or swelling of legs that does not resolve with leg elevation.  5.	Chest pain that spreads to arms, jaw or back or sudden development of numbness, weakness, difficulty speaking or facial droop – Call 911.  6.	Diabetics who are unable to keep finger stick glucose under 150 for three consecutive readings.  Instructions:  1.	 Keep a daily log for Temperature, pulse, blood pressure, and weight twice a day and Glucose if diabetic with each meal. Call office for Temp > 101, pulse greater than 110 or less than 55, BP first # greater than 160 or less than 100, 3 pound weight gain in 1 day or 5 pounds in 3 days.

## 2025-07-15 NOTE — DISCHARGE NOTE PROVIDER - NSDCMRMEDTOKEN_GEN_ALL_CORE_FT
aspirin 81 mg oral capsule: 1 cap(s) orally  benzonatate 200 mg oral capsule: 1 cap(s) orally  ezetimibe 10 mg oral tablet: 1 tab(s) orally  Flomax 0.4 mg oral capsule: 1 cap(s) orally once a day  Insulin Glargine:   isosorbide mononitrate 30 mg oral tablet, extended release: 1 tab(s) orally once a day (in the morning)  Ozempic 2 mg/1.5 mL (0.25 mg or 0.5 mg dose) subcutaneous solution: 0.5 milligram(s) subcutaneously  Plavix 75 mg oral tablet: 1 tab(s) orally  rosuvastatin 20 mg oral capsule: 1 cap(s) orally once a day (at bedtime)  SITagliptin 100 mg oral tablet: 1 tab(s) orally once a day   apixaban 5 mg oral tablet: 1 tab(s) orally 2 times a day  aspirin 81 mg oral capsule: 1 cap(s) orally  benzonatate 200 mg oral capsule: 1 cap(s) orally  ezetimibe 10 mg oral tablet: 1 tab(s) orally  Flomax 0.4 mg oral capsule: 1 cap(s) orally once a day  Insulin Glargine:   isosorbide mononitrate 30 mg oral tablet, extended release: 1 tab(s) orally once a day (in the morning)  metoprolol tartrate 25 mg oral tablet: 0.5 tab(s) orally 2 times a day please hold if blood pressure is less than 100 or heart rate is less than 60  Ozempic 2 mg/1.5 mL (0.25 mg or 0.5 mg dose) subcutaneous solution: 0.5 milligram(s) subcutaneously  rosuvastatin 20 mg oral capsule: 1 cap(s) orally once a day (at bedtime)  SITagliptin 100 mg oral tablet: 1 tab(s) orally once a day

## 2025-07-15 NOTE — CHART NOTE - NSCHARTNOTEFT_GEN_A_CORE
s/p EGD  Normal mucosa in the whole esophagus. No varices seen.   Erosions and erythema in the stomach compatible with erosive gastritis. (Biopsy).  Normal mucosa in the whole examined duodenum.        Plan:	  - Await pathology results   - Recommend starting NSBB Coreg 3.125 BID  - Resume diet   - Avoid NSAID  - PPI PO 40 QD  - No contraindications from GI standpoint to proceed with scheduled TAVR   - Rest of recs as per hepatology note s/p EGD  Normal mucosa in the whole esophagus. No varices seen.   Erosions and erythema in the stomach compatible with erosive gastritis. (Biopsy).  Normal mucosa in the whole examined duodenum.        Plan:	  - Await pathology results   - Recommend starting NSBB Coreg 3.125 BID    - Resume diet   - Avoid NSAID  - PPI PO 40 QD    - No contraindications from GI standpoint to proceed with scheduled TAVR   - Rest of recs as per hepatology note

## 2025-07-15 NOTE — H&P ADULT - HISTORY OF PRESENT ILLNESS
Mr. MARKUS OSULLIVAN is 85 year old male with PMH of HTN, DLD, CAD with recent pci stent, and hx of aortic stenosis, presented with worsening shortness of breath and chest pain and was found to have progression of his aortic stenosis, NYHA Class II. On 7/15/2025, patient underwent elective TAVR. Prior to TAVR, labs were reviewed.  Chronic mildly elevated transaminase and mild thrombocytopenia (macrocytic anemia) dating back approximately a decade.  Cirrhosis incidentally noted on CT.  Normal AST, INR, bilirubin. Due to longstanding liver disease without recent GI follow up, hepatology was consulted to evaluate for esophageal varices in event SHAHID is requiring during TAVR procedure. TAVR procedure is on hold, however pt is being admitted ro endoscopy and GI work up.

## 2025-07-15 NOTE — H&P ADULT - ASSESSMENT
Mr. MARKUS OSULLIVAN is 85 year old male with PMH of HTN, DLD, CAD with recent pci stent, and hx of aortic stenosis, presented with worsening shortness of breath and chest pain and was found to have progression of his aortic stenosis, NYHA Class II. On 7/15/2025, patient underwent elective TAVR. Prior to TAVR, labs were reviewed.  Chronic mildly elevated transaminase and mild thrombocytopenia (macrocytic anemia) dating back approximately a decade.  Cirrhosis incidentally noted on CT.  Normal AST, INR, bilirubin. Due to longstanding liver disease without recent GI follow up, hepatology was consulted to evaluate for esophageal varices in event SHAHID is requiring during TAVR procedure. TAVR procedure is on hold, however pt is being admitted ro endoscopy and GI work up.      -admit to [facility]  -hepatology consult  -endoscopy- f/u results  - pre-op for TAVR tomorrow if no varices seen

## 2025-07-15 NOTE — DISCHARGE NOTE PROVIDER - NSDCFUSCHEDAPPT_GEN_ALL_CORE_FT
Beto Flores  [facility] Physician [facility]  CTSURG [address]  Scheduled Appointment: 07/25/2025    Iris Stroud  [facility] Physician [facility]  ELECTROPH [address]  Scheduled Appointment: 08/21/2025

## 2025-07-15 NOTE — CONSULT NOTE ADULT - SUBJECTIVE AND OBJECTIVE BOX
Gastroenterology Consultation:    Patient is a 85y old  Male who presents with a chief complaint of       Admitted on: 07-15-25      HPI:      85 year old male with PMH of HTN, DLD, CAD with recent pci stent, and hx of aortic stenosis, presented with worsening shortness of breath and chest pain and was found to have progression of his aortic stenosis.           PAST MEDICAL & SURGICAL HISTORY:  DM (diabetes mellitus)  HTN (hypertension)  BPH (benign prostatic hyperplasia)  Malaria  Treated more than 20 years ago while he was working in [place]  Schistosomosis  Treated more than 20 years ago while he was working in [place]  History of viral hepatitis  Treated more than 20 years ago  History of appendectomy          FAMILY HISTORY:  negative     Social History:  Tobacco: negative   Alcohol: socially   Drugs: negative     Home Medications:  aspirin 81 mg oral capsule: 1 cap(s) orally (15 Jul 2025 09:12)  benzonatate 200 mg oral capsule: 1 cap(s) orally (15 Jul 2025 09:12)  ezetimibe 10 mg oral tablet: 1 tab(s) orally (15 Jul 2025 09:12)  Flomax 0.4 mg oral capsule: 1 cap(s) orally once a day (15 Jul 2025 09:12)  Insulin Glargine:  (15 Jul 2025 09:12)  isosorbide mononitrate 30 mg oral tablet, extended release: 1 tab(s) orally once a day (in the morning) (15 Jul 2025 09:12)  Ozempic 2 mg/1.5 mL (0.25 mg or 0.5 mg dose) subcutaneous solution: 0.5 milligram(s) subcutaneously (15 Jul 2025 09:12)  Plavix 75 mg oral tablet: 1 tab(s) orally (15 Jul 2025 09:15)  rosuvastatin 20 mg oral capsule: 1 cap(s) orally once a day (at bedtime) (15 Jul 2025 09:15)  SITagliptin 100 mg oral tablet: 1 tab(s) orally once a day (15 Jul 2025 09:12)        MEDICATIONS  (STANDING):    MEDICATIONS  (PRN):      Allergies  No Known Allergies      Review of Systems:   Constitutional:  No Fever, No Chills  ENT/Mouth:  No Hearing Changes,  No Difficulty Swallowing  Gastrointestinal:  As described in HPI  Musculoskeletal:  No Joint Swelling, No Back Pain  Neuro:  No Syncope, No Dizziness            Physical Examination:  T(C): 36.3 (07-15-25 @ 12:44), Max: 36.3 (07-15-25 @ 12:44)  HR: 78 (07-15-25 @ 12:44) (78 - 78)  BP: 151/78 (07-15-25 @ 12:44) (141/71 - 151/78)  RR: 16 (07-15-25 @ 12:44) (16 - 16)  SpO2: 99% (07-15-25 @ 12:44) (98% - 99%)  Height (cm): 170.2 (07-15-25 @ 11:20)  Weight (kg): 95.3 (07-15-25 @ 11:20)        GENERAL: AAOx3, no acute distress.  HEAD:  Atraumatic, Normocephalic  EYES: conjunctiva and sclera clear  NECK: Supple, no JVD or thyromegaly  CHEST/LUNG: Clear to auscultation bilaterally; No wheeze, rhonchi, or rales  ABDOMEN: Soft, nontender, nondistended; Bowel sounds present          Data:                        10.4   2.71  )-----------( 85       ( 15 Jul 2025 10:50 )             31.8     Hgb Trend:  10.4  07-15-25 @ 10:50      07-15    138  |  105  |  14  ----------------------------<  155[H]  4.7   |  21  |  0.9    Ca    8.9      15 Jul 2025 10:50  Mg     1.7     07-15    TPro  7.0  /  Alb  4.0  /  TBili  0.4  /  DBili  x   /  AST  81[H]  /  ALT  56[H]  /  AlkPhos  112  07-15    Liver panel trend:  TBili 0.4   /   AST 81   /   ALT 56   /   AlkP 112   /   Tptn 7.0   /   Alb 4.0    /   DBili --      07-15              Radiology:      < from: CT Angio Abdomen and Pelvis TAVR ROSENDO w/wo IV Cont (06.06.25 @ 13:19) >  ACC: [id number] EXAM:  CT ANGIO AB PEL TAVR ROSENDO WAWIC   ORDERED BY:   Judy Cortez     ACC: [id number] EXAM:  CT ANGIO HEART STRUCTURAL IC   ORDERED BY: JUDY CORTEZ     PROCEDURE DATE:  06/06/2025          INTERPRETATION:  CLINICAL INDICATION:Aortic Stenosis    COMPARISON: None.    TECHNIQUE:  Scanner: [employer] One 320 slice single source multidetector CT  Acquisition: Unenhanced axial ECG-triggered CT was obtained through the   chest. CT angiography of the heart was then performed utilizing ECG-gated   imaging. 3-D reconstruction images were obtained.    Tube potential: 120 kvp / Tube current: 300 mA    MEDICATION: None    CONTRAST:  110 cc of Omnipaque 350    FINDINGS:    VALVES:  Aortic valve: Trileaflet aortic valve. AV Calcium score: 2667.  Sinotubular junction: 30 mm x 29 mm  Sinus of Valsalva ([id number]-uf-gtieuxhbku): 34 mm x 33 mm x 33 mm  Ascending aorta: 34 mm x 33 mm    Mitral valve: No mitral valve calcification. No mitral annular   calcification. Mild calcification of the aorto-mitral curtain.    Coronary artery calcium (CAC) score:  LM: 0  LAD: 318  LCx: 106  RCA: 409  Total: 833    NATIVE CORONARY ANATOMY: There is no evidence for anomalous coronary   arteries. There is right coronary artery dominance. Coronary artery   calcified plaque, partially imaged.    CARDIAC MORPHOLOGY:  The right and left atria and ventricles are morphologically normal.  The left atrium measures 39 mm in AP diameter and is upper limit normal   caliber. The right atrium is normal size.  The interatrial septum is normal. There is no left atrial appendage   thrombus.  The main pulmonary artery measures 25 mm at the level of the right   pulmonary artery bifurcation, which is normal caliber.    Pericardium: Normal pericardial thickness without calcification. No   pericardial effusion.    IMAGED AORTA:  Normal caliber thoracic aorta in the visualized images.  Aortic arch: Incompletely imaged  Dissection: No aortic dissection in the visualized images.  Thoracic atherosclerosis: Mild thoracic atherosclerosis in the visualized   images.    IMAGED EXTRACARDIAC FINDINGS:  Extracardiac findings section dictated by radiology at the end of the   final report.    IMPRESSION:    1. Severely calcified, trileaflet aortic valve. AV Calcium score: 2667.  2. Normal caliber ascending thoracic aorta, as above.  3. Coronary artery calcified plaque, partially imaged. CAC score: 833,   which represents the 59th percentile for subjects of the same age,   gender, and race/ethnicity.    Enio Painter M.D., Attending Cardiologist  Dr. Wagner Oneill Attending, Radiology M.D., Attending Radiologist    Extracardiac Findings Only    Findings: Nodules within the thyroid gland. Correlate with thyroid   sonography.    The lungs are only partiallyimaged. Calcified granuloma within left   upper lobe. Bibasilar atelectasis. Trachea and proximal bronchi are   patent.    Partially imaged cirrhotic liver with enlargement of the left lobe.   Partially imaged large spleen. Partially imaged varicosities.    The pancreas is normal in contour. Adrenal glands within normal limits.   Both kidneys demonstrate symmetric enhancement. Moderate distention of   the urinary bladder. Large prostate gland.    Colon is only partially imaged. Scattered diverticulosis is noted..    Degenerative changes. Fat-containing inguinal hernias.    Cardiac findings independently dictated by the Cardiology Attending as   above.    --- End of Report ---            WAGNER ONEILL MD; Attending Radiologist  This document has been electronically signed. Jun 13 2025 11:46AM    < end of copied text >   Gastroenterology Consultation:        Admitted on: 07-15-25      HPI:      85 year old male with PMH of HTN, DLD, CAD with recent pci stent, and hx of aortic stenosis, presented with worsening shortness of breath and chest pain and was found to have progression of his aortic stenosis.           PAST MEDICAL & SURGICAL HISTORY:  DM (diabetes mellitus)  HTN (hypertension)  BPH (benign prostatic hyperplasia)  Malaria  Treated more than 20 years ago while he was working in [place]  Schistosomosis  Treated more than 20 years ago while he was working in [place]  History of viral hepatitis  Treated more than 20 years ago  History of appendectomy          FAMILY HISTORY:  negative     Social History:  Tobacco: negative   Alcohol: socially   Drugs: negative     Home Medications:  aspirin 81 mg oral capsule: 1 cap(s) orally (15 Jul 2025 09:12)  benzonatate 200 mg oral capsule: 1 cap(s) orally (15 Jul 2025 09:12)  ezetimibe 10 mg oral tablet: 1 tab(s) orally (15 Jul 2025 09:12)  Flomax 0.4 mg oral capsule: 1 cap(s) orally once a day (15 Jul 2025 09:12)  Insulin Glargine:  (15 Jul 2025 09:12)  isosorbide mononitrate 30 mg oral tablet, extended release: 1 tab(s) orally once a day (in the morning) (15 Jul 2025 09:12)  Ozempic 2 mg/1.5 mL (0.25 mg or 0.5 mg dose) subcutaneous solution: 0.5 milligram(s) subcutaneously (15 Jul 2025 09:12)  Plavix 75 mg oral tablet: 1 tab(s) orally (15 Jul 2025 09:15)  rosuvastatin 20 mg oral capsule: 1 cap(s) orally once a day (at bedtime) (15 Jul 2025 09:15)  SITagliptin 100 mg oral tablet: 1 tab(s) orally once a day (15 Jul 2025 09:12)        MEDICATIONS  (STANDING):    MEDICATIONS  (PRN):      Allergies  No Known Allergies      Review of Systems:   Constitutional:  No Fever, No Chills  ENT/Mouth:  No Hearing Changes,  No Difficulty Swallowing  Gastrointestinal:  As described in HPI  Musculoskeletal:  No Joint Swelling, No Back Pain  Neuro:  No Syncope, No Dizziness            Physical Examination:  T(C): 36.3 (07-15-25 @ 12:44), Max: 36.3 (07-15-25 @ 12:44)  HR: 78 (07-15-25 @ 12:44) (78 - 78)  BP: 151/78 (07-15-25 @ 12:44) (141/71 - 151/78)  RR: 16 (07-15-25 @ 12:44) (16 - 16)  SpO2: 99% (07-15-25 @ 12:44) (98% - 99%)  Height (cm): 170.2 (07-15-25 @ 11:20)  Weight (kg): 95.3 (07-15-25 @ 11:20)        GENERAL: AAOx3, no acute distress.  HEAD:  Atraumatic, Normocephalic  EYES: conjunctiva and sclera clear  NECK: Supple, no JVD or thyromegaly  CHEST/LUNG: Clear to auscultation bilaterally; No wheeze, rhonchi, or rales  ABDOMEN: Soft, nontender, nondistended; Bowel sounds present          Data:                        10.4   2.71  )-----------( 85       ( 15 Jul 2025 10:50 )             31.8     Hgb Trend:  10.4  07-15-25 @ 10:50      07-15    138  |  105  |  14  ----------------------------<  155[H]  4.7   |  21  |  0.9    Ca    8.9      15 Jul 2025 10:50  Mg     1.7     07-15    TPro  7.0  /  Alb  4.0  /  TBili  0.4  /  DBili  x   /  AST  81[H]  /  ALT  56[H]  /  AlkPhos  112  07-15    Liver panel trend:  TBili 0.4   /   AST 81   /   ALT 56   /   AlkP 112   /   Tptn 7.0   /   Alb 4.0    /   DBili --      07-15              Radiology:      < from: CT Angio Abdomen and Pelvis TAVR ROSENDO w/wo IV Cont (06.06.25 @ 13:19) >  ACC: [id number] EXAM:  CT ANGIO AB PEL TAVR ROSENDO WAWIC   ORDERED BY:   Judy Cortez     ACC: [id number] EXAM:  CT ANGIO HEART STRUCTURAL IC   ORDERED BY: JUDY CORTEZ     PROCEDURE DATE:  06/06/2025          INTERPRETATION:  CLINICAL INDICATION:Aortic Stenosis    COMPARISON: None.    TECHNIQUE:  Scanner: [employer] One 320 slice single source multidetector CT  Acquisition: Unenhanced axial ECG-triggered CT was obtained through the   chest. CT angiography of the heart was then performed utilizing ECG-gated   imaging. 3-D reconstruction images were obtained.    Tube potential: 120 kvp / Tube current: 300 mA    MEDICATION: None    CONTRAST:  110 cc of Omnipaque 350    FINDINGS:    VALVES:  Aortic valve: Trileaflet aortic valve. AV Calcium score: 2667.  Sinotubular junction: 30 mm x 29 mm  Sinus of Valsalva ([id number]-dw-[id number]): 34 mm x 33 mm x 33 mm  Ascending aorta: 34 mm x 33 mm    Mitral valve: No mitral valve calcification. No mitral annular   calcification. Mild calcification of the aorto-mitral curtain.    Coronary artery calcium (CAC) score:  LM: 0  LAD: 318  LCx: 106  RCA: 409  Total: 833    NATIVE CORONARY ANATOMY: There is no evidence for anomalous coronary   arteries. There is right coronary artery dominance. Coronary artery   calcified plaque, partially imaged.    CARDIAC MORPHOLOGY:  The right and left atria and ventricles are morphologically normal.  The left atrium measures 39 mm in AP diameter and is upper limit normal   caliber. The right atrium is normal size.  The interatrial septum is normal. There is no left atrial appendage   thrombus.  The main pulmonary artery measures 25 mm at the level of the right   pulmonary artery bifurcation, which is normal caliber.    Pericardium: Normal pericardial thickness without calcification. No   pericardial effusion.    IMAGED AORTA:  Normal caliber thoracic aorta in the visualized images.  Aortic arch: Incompletely imaged  Dissection: No aortic dissection in the visualized images.  Thoracic atherosclerosis: Mild thoracic atherosclerosis in the visualized   images.    IMAGED EXTRACARDIAC FINDINGS:  Extracardiac findings section dictated by radiology at the end of the   final report.    IMPRESSION:    1. Severely calcified, trileaflet aortic valve. AV Calcium score: 2667.  2. Normal caliber ascending thoracic aorta, as above.  3. Coronary artery calcified plaque, partially imaged. CAC score: 833,   which represents the 59th percentile for subjects of the same age,   gender, and race/ethnicity.    Enio Painter M.D., Attending Cardiologist  Dr. Wagner Oneill Attending, Radiology M.D., Attending Radiologist    Extracardiac Findings Only    Findings: Nodules within the thyroid gland. Correlate with thyroid   sonography.    The lungs are only partiallyimaged. Calcified granuloma within left   upper lobe. Bibasilar atelectasis. Trachea and proximal bronchi are   patent.    Partially imaged cirrhotic liver with enlargement of the left lobe.   Partially imaged large spleen. Partially imaged varicosities.    The pancreas is normal in contour. Adrenal glands within normal limits.   Both kidneys demonstrate symmetric enhancement. Moderate distention of   the urinary bladder. Large prostate gland.    Colon is only partially imaged. Scattered diverticulosis is noted..    Degenerative changes. Fat-containing inguinal hernias.    Cardiac findings independently dictated by the Cardiology Attending as   above.    --- End of Report ---            WAGNER ONEILL MD; Attending Radiologist  This document has been electronically signed. Jun 13 2025 11:46AM    < end of copied text >

## 2025-07-15 NOTE — DISCHARGE NOTE PROVIDER - HOSPITAL COURSE
Mr. MARKUS OSULLIVAN is 85 year old male with PMH of HTN, DLD, CAD with recent pci stent, and hx of aortic stenosis, presented with worsening shortness of breath and chest pain and was found to have progression of his aortic stenosis, NYHA Class II. On 7/15/2025, patient underwent elective TAVR.       Patient will be discharged on an MCOT monitor for 30 days to evaluate for potential rhythm disturbances due to TAVR. MCOT device was placed on patient in CTU and patient instructed on proper use and care. Patient expressed understanding confirmed by teach back. Patient advised to call CT surgery clinic with any questions or concerns.      A discussion was conducted with the patient regarding the benefits and importance of participating in a cardiothoracic rehabilitation program. Contact information given to patient.  Patient instructed to inquire further upon seeing his cardiologist post operatively.   Mr. MARKUS OSULLIVAN is 85 year old male with PMH of HTN, DLD, CAD with recent pci stent, and hx of aortic stenosis, presented with worsening shortness of breath and chest pain and was found to have progression of his aortic stenosis, NYHA Class II. On 7/15/2025, patient underwent elective TAVR. Postoperatively, no significant events overnight. TVP removed with no complications. Echo completed, TAVR valve in proper aortic position. Patient is medically optimized and is being discharged home, denied home care services.       Patient will be discharged on an MCOT monitor for 30 days to evaluate for potential rhythm disturbances due to TAVR. MCOT device was placed on patient in CTU and patient instructed on proper use and care. Patient expressed understanding confirmed by teach back. Patient advised to call CT surgery clinic with any questions or concerns.      A discussion was conducted with the patient regarding the benefits and importance of participating in a cardiothoracic rehabilitation program. Contact information given to patient.  Patient instructed to inquire further upon seeing his cardiologist post operatively.

## 2025-07-15 NOTE — CONSULT NOTE ADULT - ATTENDING COMMENTS
85 year old  M with  T2DM HTN, DLD, CAD with recent  stenting aortic stenosis admitted with worsening shortness of breath and chest pain due to worsening  aortic stenosis seen for cirrhosis    Aware of cirrhosis for many years. ? hx of hepatitis C schistosomiasis. No hx of EGD     Obese  Alert oriented x 3  No icterus  Abdomen patulous soft non tender  Ext no edema    MASLD cirrhosis compensated   EGD no varices, has atrophic gastritis  Portal HTN ectopic varices recanalization of UV    Start NSBB if no cardiac contraindication   Can proced with TAVR from hepatology standpoint  OP MR abdomen 85 year old  M with  T2DM HTN, DLD, CAD with recent  stenting aortic stenosis admitted with worsening shortness of breath and chest pain due to worsening  aortic stenosis seen for cirrhosis    Aware of cirrhosis for many years. ? hx of hepatitis C schistosomiasis. No hx of EGD     Obese  Alert oriented x 3  No icterus  Abdomen patulous soft non tender  Ext no edema    MASLD cirrhosis compensated   EGD no varices, has atrophic gastritis  Portal HTN ectopic varices recanalization of UV    Start NSBB if no cardiac contraindication   Can proced with TAVR from hepatology standpoint  Hepatitis B  C screen  OP MR abdomen

## 2025-07-16 ENCOUNTER — RESULT REVIEW (OUTPATIENT)
Age: 85
End: 2025-07-16

## 2025-07-16 LAB
ALBUMIN SERPL ELPH-MCNC: 3.5 G/DL — SIGNIFICANT CHANGE UP (ref 3.5–5.2)
ALBUMIN SERPL ELPH-MCNC: 4 G/DL — SIGNIFICANT CHANGE UP (ref 3.5–5.2)
ALP SERPL-CCNC: 103 U/L — SIGNIFICANT CHANGE UP (ref 30–115)
ALP SERPL-CCNC: 120 U/L — HIGH (ref 30–115)
ALT FLD-CCNC: 51 U/L — HIGH (ref 0–41)
ALT FLD-CCNC: 59 U/L — HIGH (ref 0–41)
ANION GAP SERPL CALC-SCNC: 12 MMOL/L — SIGNIFICANT CHANGE UP (ref 7–14)
ANION GAP SERPL CALC-SCNC: 13 MMOL/L — SIGNIFICANT CHANGE UP (ref 7–14)
APTT BLD: 31.8 SEC — SIGNIFICANT CHANGE UP (ref 27–39.2)
AST SERPL-CCNC: 75 U/L — HIGH (ref 0–41)
AST SERPL-CCNC: 81 U/L — HIGH (ref 0–41)
BASOPHILS # BLD AUTO: 0.02 K/UL — SIGNIFICANT CHANGE UP (ref 0–0.2)
BASOPHILS NFR BLD AUTO: 0.6 % — SIGNIFICANT CHANGE UP (ref 0–1)
BILIRUB SERPL-MCNC: 0.3 MG/DL — SIGNIFICANT CHANGE UP (ref 0.2–1.2)
BILIRUB SERPL-MCNC: 0.4 MG/DL — SIGNIFICANT CHANGE UP (ref 0.2–1.2)
BUN SERPL-MCNC: 15 MG/DL — SIGNIFICANT CHANGE UP (ref 10–20)
BUN SERPL-MCNC: 15 MG/DL — SIGNIFICANT CHANGE UP (ref 10–20)
CALCIUM SERPL-MCNC: 8 MG/DL — LOW (ref 8.4–10.5)
CALCIUM SERPL-MCNC: 9 MG/DL — SIGNIFICANT CHANGE UP (ref 8.4–10.5)
CHLORIDE SERPL-SCNC: 105 MMOL/L — SIGNIFICANT CHANGE UP (ref 98–110)
CHLORIDE SERPL-SCNC: 106 MMOL/L — SIGNIFICANT CHANGE UP (ref 98–110)
CO2 SERPL-SCNC: 19 MMOL/L — SIGNIFICANT CHANGE UP (ref 17–32)
CO2 SERPL-SCNC: 21 MMOL/L — SIGNIFICANT CHANGE UP (ref 17–32)
CREAT SERPL-MCNC: 0.8 MG/DL — SIGNIFICANT CHANGE UP (ref 0.7–1.5)
CREAT SERPL-MCNC: 0.9 MG/DL — SIGNIFICANT CHANGE UP (ref 0.7–1.5)
EGFR: 84 ML/MIN/1.73M2 — SIGNIFICANT CHANGE UP
EGFR: 84 ML/MIN/1.73M2 — SIGNIFICANT CHANGE UP
EGFR: 87 ML/MIN/1.73M2 — SIGNIFICANT CHANGE UP
EGFR: 87 ML/MIN/1.73M2 — SIGNIFICANT CHANGE UP
EOSINOPHIL # BLD AUTO: 0.08 K/UL — SIGNIFICANT CHANGE UP (ref 0–0.7)
EOSINOPHIL NFR BLD AUTO: 2.3 % — SIGNIFICANT CHANGE UP (ref 0–8)
GAS PNL BLDA: SIGNIFICANT CHANGE UP
GLUCOSE BLDC GLUCOMTR-MCNC: 155 MG/DL — HIGH (ref 70–99)
GLUCOSE BLDC GLUCOMTR-MCNC: 184 MG/DL — HIGH (ref 70–99)
GLUCOSE BLDC GLUCOMTR-MCNC: 222 MG/DL — HIGH (ref 70–99)
GLUCOSE SERPL-MCNC: 161 MG/DL — HIGH (ref 70–99)
GLUCOSE SERPL-MCNC: 216 MG/DL — HIGH (ref 70–99)
HCT VFR BLD CALC: 31.1 % — LOW (ref 42–52)
HCT VFR BLD CALC: 32 % — LOW (ref 42–52)
HGB BLD-MCNC: 10.4 G/DL — LOW (ref 14–18)
HGB BLD-MCNC: 10.7 G/DL — LOW (ref 14–18)
IMM GRANULOCYTES NFR BLD AUTO: 0.3 % — SIGNIFICANT CHANGE UP (ref 0.1–0.3)
INR BLD: 1.31 RATIO — HIGH (ref 0.65–1.3)
LYMPHOCYTES # BLD AUTO: 1.01 K/UL — LOW (ref 1.2–3.4)
LYMPHOCYTES # BLD AUTO: 29.4 % — SIGNIFICANT CHANGE UP (ref 20.5–51.1)
MAGNESIUM SERPL-MCNC: 1.8 MG/DL — SIGNIFICANT CHANGE UP (ref 1.8–2.4)
MCHC RBC-ENTMCNC: 31.3 PG — HIGH (ref 27–31)
MCHC RBC-ENTMCNC: 31.4 PG — HIGH (ref 27–31)
MCHC RBC-ENTMCNC: 33.4 G/DL — SIGNIFICANT CHANGE UP (ref 32–37)
MCHC RBC-ENTMCNC: 33.4 G/DL — SIGNIFICANT CHANGE UP (ref 32–37)
MCV RBC AUTO: 93.6 FL — SIGNIFICANT CHANGE UP (ref 80–94)
MCV RBC AUTO: 94 FL — SIGNIFICANT CHANGE UP (ref 80–94)
MONOCYTES # BLD AUTO: 0.29 K/UL — SIGNIFICANT CHANGE UP (ref 0.1–0.6)
MONOCYTES NFR BLD AUTO: 8.4 % — SIGNIFICANT CHANGE UP (ref 1.7–9.3)
NEUTROPHILS # BLD AUTO: 2.03 K/UL — SIGNIFICANT CHANGE UP (ref 1.4–6.5)
NEUTROPHILS NFR BLD AUTO: 59 % — SIGNIFICANT CHANGE UP (ref 42.2–75.2)
NRBC BLD AUTO-RTO: 0 /100 WBCS — SIGNIFICANT CHANGE UP (ref 0–0)
NRBC BLD AUTO-RTO: 0 /100 WBCS — SIGNIFICANT CHANGE UP (ref 0–0)
PLATELET # BLD AUTO: 71 K/UL — LOW (ref 130–400)
PLATELET # BLD AUTO: 74 K/UL — LOW (ref 130–400)
PMV BLD: 10.1 FL — SIGNIFICANT CHANGE UP (ref 7.4–10.4)
PMV BLD: 10.5 FL — HIGH (ref 7.4–10.4)
POTASSIUM SERPL-MCNC: 3.9 MMOL/L — SIGNIFICANT CHANGE UP (ref 3.5–5)
POTASSIUM SERPL-MCNC: 4.4 MMOL/L — SIGNIFICANT CHANGE UP (ref 3.5–5)
POTASSIUM SERPL-SCNC: 3.9 MMOL/L — SIGNIFICANT CHANGE UP (ref 3.5–5)
POTASSIUM SERPL-SCNC: 4.4 MMOL/L — SIGNIFICANT CHANGE UP (ref 3.5–5)
PROT SERPL-MCNC: 6.1 G/DL — SIGNIFICANT CHANGE UP (ref 6–8)
PROT SERPL-MCNC: 6.9 G/DL — SIGNIFICANT CHANGE UP (ref 6–8)
PROTHROM AB SERPL-ACNC: 15.6 SEC — HIGH (ref 9.95–12.87)
RBC # BLD: 3.31 M/UL — LOW (ref 4.7–6.1)
RBC # BLD: 3.42 M/UL — LOW (ref 4.7–6.1)
RBC # FLD: 12.8 % — SIGNIFICANT CHANGE UP (ref 11.5–14.5)
RBC # FLD: 13 % — SIGNIFICANT CHANGE UP (ref 11.5–14.5)
SODIUM SERPL-SCNC: 137 MMOL/L — SIGNIFICANT CHANGE UP (ref 135–146)
SODIUM SERPL-SCNC: 139 MMOL/L — SIGNIFICANT CHANGE UP (ref 135–146)
SURGICAL PATHOLOGY STUDY: SIGNIFICANT CHANGE UP
WBC # BLD: 2.24 K/UL — LOW (ref 4.8–10.8)
WBC # BLD: 3.44 K/UL — LOW (ref 4.8–10.8)
WBC # FLD AUTO: 2.24 K/UL — LOW (ref 4.8–10.8)
WBC # FLD AUTO: 3.44 K/UL — LOW (ref 4.8–10.8)

## 2025-07-16 PROCEDURE — 93306 TTE W/DOPPLER COMPLETE: CPT | Mod: 26

## 2025-07-16 PROCEDURE — 99291 CRITICAL CARE FIRST HOUR: CPT

## 2025-07-16 PROCEDURE — 33361 REPLACE AORTIC VALVE PERQ: CPT | Mod: 62,Q0

## 2025-07-16 PROCEDURE — 93010 ELECTROCARDIOGRAM REPORT: CPT | Mod: 76

## 2025-07-16 PROCEDURE — 99292 CRITICAL CARE ADDL 30 MIN: CPT

## 2025-07-16 PROCEDURE — 71045 X-RAY EXAM CHEST 1 VIEW: CPT | Mod: 26

## 2025-07-16 RX ORDER — CALCIUM GLUCONATE 20 MG/ML
1 INJECTION, SOLUTION INTRAVENOUS ONCE
Refills: 0 | Status: COMPLETED | OUTPATIENT
Start: 2025-07-16 | End: 2025-07-16

## 2025-07-16 RX ORDER — ACETAMINOPHEN 500 MG/5ML
1000 LIQUID (ML) ORAL ONCE
Refills: 0 | Status: COMPLETED | OUTPATIENT
Start: 2025-07-17 | End: 2025-07-17

## 2025-07-16 RX ORDER — NOREPINEPHRINE BITARTRATE 8 MG
0.05 SOLUTION INTRAVENOUS
Qty: 8 | Refills: 0 | Status: DISCONTINUED | OUTPATIENT
Start: 2025-07-16 | End: 2025-07-17

## 2025-07-16 RX ORDER — METOPROLOL SUCCINATE 50 MG/1
12.5 TABLET, EXTENDED RELEASE ORAL EVERY 6 HOURS
Refills: 0 | Status: DISCONTINUED | OUTPATIENT
Start: 2025-07-16 | End: 2025-07-17

## 2025-07-16 RX ORDER — NICARDIPINE HCL 30 MG
5 CAPSULE ORAL
Qty: 40 | Refills: 0 | Status: DISCONTINUED | OUTPATIENT
Start: 2025-07-16 | End: 2025-07-17

## 2025-07-16 RX ORDER — ACETAMINOPHEN 500 MG/5ML
1000 LIQUID (ML) ORAL ONCE
Refills: 0 | Status: COMPLETED | OUTPATIENT
Start: 2025-07-16 | End: 2025-07-16

## 2025-07-16 RX ORDER — HEPARIN SODIUM 1000 [USP'U]/ML
3000 INJECTION INTRAVENOUS; SUBCUTANEOUS EVERY 12 HOURS
Refills: 0 | Status: DISCONTINUED | OUTPATIENT
Start: 2025-07-17 | End: 2025-07-17

## 2025-07-16 RX ORDER — SODIUM BICARBONATE 1 MEQ/ML
25 SYRINGE (ML) INTRAVENOUS ONCE
Refills: 0 | Status: COMPLETED | OUTPATIENT
Start: 2025-07-16 | End: 2025-07-16

## 2025-07-16 RX ADMIN — Medication 3 MILLILITER(S): at 13:40

## 2025-07-16 RX ADMIN — METOPROLOL SUCCINATE 12.5 MILLIGRAM(S): 50 TABLET, EXTENDED RELEASE ORAL at 22:58

## 2025-07-16 RX ADMIN — Medication 15 MILLILITER(S): at 06:36

## 2025-07-16 RX ADMIN — INSULIN LISPRO 2: 100 INJECTION, SOLUTION INTRAVENOUS; SUBCUTANEOUS at 06:35

## 2025-07-16 RX ADMIN — Medication 3 MILLILITER(S): at 22:58

## 2025-07-16 RX ADMIN — Medication 100 MILLIEQUIVALENT(S): at 20:45

## 2025-07-16 RX ADMIN — Medication 3 MILLILITER(S): at 06:58

## 2025-07-16 RX ADMIN — Medication 1000 MILLIGRAM(S): at 20:48

## 2025-07-16 RX ADMIN — Medication 40 MILLIGRAM(S): at 06:18

## 2025-07-16 RX ADMIN — Medication 400 MILLIGRAM(S): at 20:40

## 2025-07-16 RX ADMIN — CALCIUM GLUCONATE 100 GRAM(S): 20 INJECTION, SOLUTION INTRAVENOUS at 22:58

## 2025-07-16 RX ADMIN — TAMSULOSIN HYDROCHLORIDE 0.4 MILLIGRAM(S): 0.4 CAPSULE ORAL at 22:58

## 2025-07-16 RX ADMIN — Medication 25 MILLIEQUIVALENT(S): at 20:46

## 2025-07-16 NOTE — PROGRESS NOTE ADULT - SUBJECTIVE AND OBJECTIVE BOX
CTU Attending Progress Daily Note     16 Jul 2025 18:43  Procedure: TAVR on 07-16-25      History of DM (diabetes mellitus)    HTN (hypertension)    BPH (benign prostatic hyperplasia)    Malaria    Schistosomosis    History of viral hepatitis      HPI:  Mr. MARKUS OSULLIVAN is 85 year old male with PMH of HTN, DLD, CAD with recent pci stent, and hx of aortic stenosis, presented with worsening shortness of breath and chest pain and was found to have progression of his aortic stenosis, NYHA Class II. On 7/15/2025, patient underwent elective TAVR. Prior to TAVR, labs were reviewed.  Chronic mildly elevated transaminase and mild thrombocytopenia (macrocytic anemia) dating back approximately a decade.  Cirrhosis incidentally noted on CT.  Normal AST, INR, bilirubin. Due to longstanding liver disease without recent GI follow up, hepatology was consulted to evaluate for esophageal varices in event SHAHID is requiring during TAVR procedure. TAVR procedure is on hold, however pt is being admitted ro endoscopy and GI work up.       (15 Jul 2025 13:06)        Hospital Course:  7/16 TAVR    OBJECTIVE:  ICU Vital Signs Last 24 Hrs  T(C): 36.4 (16 Jul 2025 15:25), Max: 36.7 (15 Jul 2025 20:00)  T(F): 97.5 (16 Jul 2025 15:14), Max: 98 (15 Jul 2025 20:00)  HR: 74 (16 Jul 2025 15:25) (71 - 99)  BP: 126/59 (16 Jul 2025 15:25) (124/68 - 165/77)  BP(mean): 85 (16 Jul 2025 15:25) (85 - 110)  ABP: --  ABP(mean): --  RR: 24 (16 Jul 2025 15:25) (19 - 34)  SpO2: 97% (16 Jul 2025 15:25) (88% - 98%)    O2 Parameters below as of 16 Jul 2025 15:00  Patient On (Oxygen Delivery Method): room air          I&O's Summary    15 Jul 2025 07:01  -  16 Jul 2025 07:00  --------------------------------------------------------  IN: 450 mL / OUT: 1650 mL / NET: -1200 mL    16 Jul 2025 07:01  -  16 Jul 2025 18:43  --------------------------------------------------------  IN: 0 mL / OUT: 1 mL / NET: -1 mL      I&O's Detail    15 Jul 2025 07:01  -  16 Jul 2025 07:00  --------------------------------------------------------  IN:    Oral Fluid: 450 mL  Total IN: 450 mL    OUT:    Voided (mL): 1650 mL  Total OUT: 1650 mL    Total NET: -1200 mL      16 Jul 2025 07:01  -  16 Jul 2025 18:43  --------------------------------------------------------  IN:  Total IN: 0 mL    OUT:    Voided (mL): 1 mL  Total OUT: 1 mL    Total NET: -1 mL        Adult Advanced Hemodynamics Last 24 Hrs  CVP(mm Hg): --  CVP(cm H2O): --  CO: --  CI: --  PA: --  PA(mean): --  PCWP: --  SVR: --  SVRI: --  PVR: --  PVRI: --    CAPILLARY BLOOD GLUCOSE      POCT Blood Glucose.: 155 mg/dL (16 Jul 2025 13:12)  POCT Blood Glucose.: 222 mg/dL (16 Jul 2025 06:31)  POCT Blood Glucose.: 298 mg/dL (15 Jul 2025 21:20)    LABS:                          10.7   2.24  )-----------( 74       ( 16 Jul 2025 02:35 )             32.0     07-16    139  |  106  |  15  ----------------------------<  216[H]  4.4   |  21  |  0.9    Ca    9.0      16 Jul 2025 02:35  Mg     1.8     07-16    TPro  6.9  /  Alb  4.0  /  TBili  0.3  /  DBili  x   /  AST  81[H]  /  ALT  59[H]  /  AlkPhos  120[H]  07-16      Urinalysis Basic - ( 16 Jul 2025 02:35 )    Color: x / Appearance: x / SG: x / pH: x  Gluc: 216 mg/dL / Ketone: x  / Bili: x / Urobili: x   Blood: x / Protein: x / Nitrite: x   Leuk Esterase: x / RBC: x / WBC x   Sq Epi: x / Non Sq Epi: x / Bacteria: x        Home Medications:  aspirin 81 mg oral capsule: 1 cap(s) orally (15 Jul 2025 09:12)  benzonatate 200 mg oral capsule: 1 cap(s) orally (15 Jul 2025 09:12)  ezetimibe 10 mg oral tablet: 1 tab(s) orally (15 Jul 2025 09:12)  Flomax 0.4 mg oral capsule: 1 cap(s) orally once a day (15 Jul 2025 09:12)  Insulin Glargine:  (15 Jul 2025 09:12)  isosorbide mononitrate 30 mg oral tablet, extended release: 1 tab(s) orally once a day (in the morning) (15 Jul 2025 09:12)  Ozempic 2 mg/1.5 mL (0.25 mg or 0.5 mg dose) subcutaneous solution: 0.5 milligram(s) subcutaneously (15 Jul 2025 09:12)  Plavix 75 mg oral tablet: 1 tab(s) orally (15 Jul 2025 09:15)  rosuvastatin 20 mg oral capsule: 1 cap(s) orally once a day (at bedtime) (15 Jul 2025 09:15)  SITagliptin 100 mg oral tablet: 1 tab(s) orally once a day (15 Jul 2025 09:12)    HOSPITAL MEDICATIONS:  MEDICATIONS  (STANDING):  acetaminophen   IVPB .. 1000 milliGRAM(s) IV Intermittent once  aspirin  chewable 81 milliGRAM(s) Oral daily  chlorhexidine 0.12% Liquid 15 milliLiter(s) Oral Mucosa every 12 hours  clopidogrel Tablet 75 milliGRAM(s) Oral daily  dextrose 5%. 1000 milliLiter(s) (100 mL/Hr) IV Continuous <Continuous>  dextrose 5%. 1000 milliLiter(s) (50 mL/Hr) IV Continuous <Continuous>  dextrose 50% Injectable 25 Gram(s) IV Push once  dextrose 50% Injectable 12.5 Gram(s) IV Push once  dextrose 50% Injectable 25 Gram(s) IV Push once  glucagon  Injectable 1 milliGRAM(s) IntraMuscular once  heparin   Injectable 5000 Unit(s) SubCutaneous every 8 hours  insulin lispro (ADMELOG) corrective regimen sliding scale   SubCutaneous three times a day before meals  insulin lispro (ADMELOG) corrective regimen sliding scale   SubCutaneous at bedtime  meperidine     Injectable 25 milliGRAM(s) IV Push once  niCARdipine Infusion 5 mG/Hr (25 mL/Hr) IV Continuous <Continuous>  norepinephrine Infusion 0.05 MICROgram(s)/kG/Min (8.93 mL/Hr) IV Continuous <Continuous>  pantoprazole  Injectable 40 milliGRAM(s) IV Push two times a day  sodium chloride 0.9% lock flush 3 milliLiter(s) IV Push every 8 hours  sodium chloride 0.9%. 1000 milliLiter(s) (10 mL/Hr) IV Continuous <Continuous>  tamsulosin 0.4 milliGRAM(s) Oral at bedtime    MEDICATIONS  (PRN):  dextrose Oral Gel 15 Gram(s) Oral once PRN Blood Glucose LESS THAN 70 milliGRAM(s)/deciliter    RADIOLOGY:  Chest X-ray Reviewed    REVIEW OF SYSTEMS:  CONSTITUTIONAL: [X] all negative; [ ] weakness, [ ] fevers, [ ] chills  EYES/ENT: [X] all negative; [ ] visual changes, [ ] vertigo, [ ] throat pain   NECK: [X] all negative; [ ] pain, [ ] stiffness  RESPIRATORY: [] all negative, [ ] cough, [ ] wheezing, [ ] hemoptysis, [ ] shortness of breath  CARDIOVASCULAR: [] all negative; [ ] chest pain, [ ] palpitations, [ ] orthopnea  GASTROINTESTINAL: [X] all negative; [ ]abdominal pain, [ ] nausea, [ ] vomiting, [ ] hematemesis, [ ] diarrhea, [ ] constipation, [ ] melena, [ ] hematochezia.  GENITOURINARY: [X] all negative; [ ] dysuria, [ ] frequency, [ ] hematuria  NEUROLOGICAL: [X] all negative; [ ] numbness, [ ] weakness  SKIN: [X] all negative; [ ] itching, [ ] burning, [ ] rashes, [ ] lesions   All other review of systems is negative unless indicated above.  [  ] Unable to assess ROS because     PHYSICAL EXAM:          CONSTITUTIONAL: Well-developed; well-nourished; in no acute distress.   	SKIN: warm, dry  	HEAD: Normocephalic; atraumatic.  	EYES: PERRL, EOM, no conj injection, sclera clear  	ENT: No nasal discharge; airway clear.  	NECK: Supple; non tender.   	CARD: S1, S2 normal; no murmurs, gallops, or rubs. Regular rate and rhythm.  no carotid bruits  	RESP: CTA B/L; good air movement No wheezes, rales or rhonchi.  	ABD: Soft, not tender, not distended,  no rebound or guarding, bowel sounds present  	EXT: Normal ROM.  No clubbing, cyanosis or edema.   warm and well perfused.  pulses palpable;  Groin sites C/D/I, no hematoma  	NEURO: Alert, awake, motor 5/5 R, 5/5 L    Assessment  85y  S/p TAVR    Plan:  Monitor neuro status post optes   Pain control as needed    S/p TAVR  Monitor rhythm  EKG post op and in 4hrs  Telemetry monitoring  TVP in place - pacing as needed  Monitor for coronary ischemia  Monitor groin sites for bleeding  Post op labs to check for bleeding / coagulopathy  Volume resuscitation as needed  Echo tomorrow  Cardene as needed for Systolic < 140s  Resume home BP meds when able  Resume home AC if applicable tomorrow    Oxygen via NRB  Wean to NC as tolerated  IS / Chest PT / OOBTC / Ambulate    Cirrhosis - no varices  Prior hepatitis  Low platelets  Monitor for bleeding  Advance diet once awake  Bowel regimen  GI PPx    High risk for GUERDA post procedure  Monitor UOP / lytes / creatinine    Periop ppx abx    DVT ppx    Upon my evaluation, the above patient has a high probability of imminent or life threatening deterioration due to a high risk for Shock, Cardiogenic shock, arrhythmias acute blood loss, acute kidney injury and acute pulmonary insufficiency which required my direct attention, intervention, and personal management.  Total critical care time documented below includes review of radiology results, ordering, interpreting and reviewing diagnostic studies / lab tests with immediate assessment and treatment, consultant collaboration on findings and treatment options, monitoring for potential decompensation, obtaining history from family, EMT, nursing home staff and/or treating physicians; directing the titration of pressors, oxygen support devices, fluid resuscitation interpretation of cardiac output measurements, interpretation of radiological assessments, interpretation of EKG / rhythm strips, telemetry.  This time did not overlap with the management of other patients or performing separately reportable procedures.      Management of this patient was discussed with the CT surgery attending and mid-level providers.    I acknowledge the use of copied documentation.  All copy forward documentation is my own and has been reviewed and revised as appropriate.  If no changes were made, it is because that information remains the same.

## 2025-07-16 NOTE — PRE-ANESTHESIA EVALUATION ADULT - LAST ECHOCARDIOGRAM
NORMAL LV AND RV FUNCTION. SEVERE AS

## 2025-07-16 NOTE — BRIEF OPERATIVE NOTE - OPERATION/FINDINGS
Medtronic Evolut FX+ 34mm valve, right femoral artery 18Fr access, right femoral venous access for TVP, left femoral 5Fr access, bilateral femoral arterial Perclose Medtronic Evolut FX+ 34mm valve, right femoral artery 18Fr access, right femoral venous access for TVP, left femoral 8Fr access (manual hold), bilateral femoral arterial Perclose

## 2025-07-16 NOTE — CHART NOTE - NSCHARTNOTEFT_GEN_A_CORE
ICU ANESTHESIA ADMISSION NOTE      Procedure: TAVR, percutaneous      Post op diagnosis:  Aortic stenosis        ____  Intubated  TV:______       Rate: ______      FiO2: ______    __x__  Patent Airway    __x__  Full return of protective reflexes    __x__  Full recovery from anesthesia / back to baseline status    Vitals:  T(C): 36.4 (07-16-25 @ 15:25), Max: 36.7 (07-15-25 @ 20:00)  HR: 74 (07-16-25 @ 15:25) (71 - 99)  BP: 126/59 (07-16-25 @ 15:25) (124/68 - 165/77)  RR: 24 (07-16-25 @ 15:25) (19 - 34)  SpO2: 97% (07-16-25 @ 15:25) (88% - 98%)    Mental Status:  __x__ Awake   ___x__ Alert   _____ Drowsy   _____ Sedated    Nausea/Vomiting:  __x__ NO  ______Yes,   See Post - Op Orders          Pain Scale (0-10):  _____    Treatment: ____ None    __x__ See Post - Op/PCA Orders    Post - Operative Fluids:   ____ Oral   __x__ See Post - Op Orders    Plan: Discharge:   ____Home       _____Floor     _____Critical Care    _____  Other:_________________    Comments: Patient had smooth intraoperative event, no anesthesia complication.  PACU Vital signs: HR: 112           BP: 142       /  70        RR: 14            O2 Sat:  96     %     Temp 37C

## 2025-07-16 NOTE — PRE-ANESTHESIA EVALUATION ADULT - NSANTHOSAYNRD_GEN_A_CORE
No. SANDRA screening performed.  STOP BANG Legend: 0-2 = LOW Risk; 3-4 = INTERMEDIATE Risk; 5-8 = HIGH Risk

## 2025-07-16 NOTE — PRE-ANESTHESIA EVALUATION ADULT - NSANTHADDINFOFT_GEN_ALL_CORE
INTERVAL ASSESSMENT FROM HEPATOLOGY AND GI NOTED
MAC planned, backup GA  Discussed risks, including dental injury and more serious complications including cardiac and pulmonary complications and stroke.  Patient expresses understanding with regard to risks of anesthesia and wishes to proceed.

## 2025-07-17 ENCOUNTER — TRANSCRIPTION ENCOUNTER (OUTPATIENT)
Age: 85
End: 2025-07-17

## 2025-07-17 ENCOUNTER — RESULT REVIEW (OUTPATIENT)
Age: 85
End: 2025-07-17

## 2025-07-17 VITALS
RESPIRATION RATE: 24 BRPM | DIASTOLIC BLOOD PRESSURE: 58 MMHG | OXYGEN SATURATION: 99 % | SYSTOLIC BLOOD PRESSURE: 125 MMHG | HEART RATE: 78 BPM

## 2025-07-17 LAB
ALBUMIN SERPL ELPH-MCNC: 3.2 G/DL — LOW (ref 3.5–5.2)
ALP SERPL-CCNC: 94 U/L — SIGNIFICANT CHANGE UP (ref 30–115)
ALT FLD-CCNC: 54 U/L — HIGH (ref 0–41)
ANION GAP SERPL CALC-SCNC: 12 MMOL/L — SIGNIFICANT CHANGE UP (ref 7–14)
AST SERPL-CCNC: 111 U/L — HIGH (ref 0–41)
BASOPHILS # BLD AUTO: 0.02 K/UL — SIGNIFICANT CHANGE UP (ref 0–0.2)
BASOPHILS NFR BLD AUTO: 0.9 % — SIGNIFICANT CHANGE UP (ref 0–1)
BILIRUB SERPL-MCNC: 0.3 MG/DL — SIGNIFICANT CHANGE UP (ref 0.2–1.2)
BUN SERPL-MCNC: 16 MG/DL — SIGNIFICANT CHANGE UP (ref 10–20)
CALCIUM SERPL-MCNC: 7.9 MG/DL — LOW (ref 8.4–10.5)
CHLORIDE SERPL-SCNC: 107 MMOL/L — SIGNIFICANT CHANGE UP (ref 98–110)
CO2 SERPL-SCNC: 22 MMOL/L — SIGNIFICANT CHANGE UP (ref 17–32)
CREAT SERPL-MCNC: 0.9 MG/DL — SIGNIFICANT CHANGE UP (ref 0.7–1.5)
EGFR: 84 ML/MIN/1.73M2 — SIGNIFICANT CHANGE UP
EGFR: 84 ML/MIN/1.73M2 — SIGNIFICANT CHANGE UP
EOSINOPHIL # BLD AUTO: 0.05 K/UL — SIGNIFICANT CHANGE UP (ref 0–0.7)
EOSINOPHIL NFR BLD AUTO: 2.1 % — SIGNIFICANT CHANGE UP (ref 0–8)
GLUCOSE BLDC GLUCOMTR-MCNC: 198 MG/DL — HIGH (ref 70–99)
GLUCOSE BLDC GLUCOMTR-MCNC: 234 MG/DL — HIGH (ref 70–99)
GLUCOSE BLDC GLUCOMTR-MCNC: 253 MG/DL — HIGH (ref 70–99)
GLUCOSE SERPL-MCNC: 238 MG/DL — HIGH (ref 70–99)
HCT VFR BLD CALC: 28.7 % — LOW (ref 42–52)
HGB BLD-MCNC: 9.5 G/DL — LOW (ref 14–18)
IMM GRANULOCYTES NFR BLD AUTO: 0 % — LOW (ref 0.1–0.3)
LYMPHOCYTES # BLD AUTO: 0.73 K/UL — LOW (ref 1.2–3.4)
LYMPHOCYTES # BLD AUTO: 31.2 % — SIGNIFICANT CHANGE UP (ref 20.5–51.1)
MAGNESIUM SERPL-MCNC: 1.8 MG/DL — SIGNIFICANT CHANGE UP (ref 1.8–2.4)
MCHC RBC-ENTMCNC: 31.4 PG — HIGH (ref 27–31)
MCHC RBC-ENTMCNC: 33.1 G/DL — SIGNIFICANT CHANGE UP (ref 32–37)
MCV RBC AUTO: 94.7 FL — HIGH (ref 80–94)
MONOCYTES # BLD AUTO: 0.26 K/UL — SIGNIFICANT CHANGE UP (ref 0.1–0.6)
MONOCYTES NFR BLD AUTO: 11.1 % — HIGH (ref 1.7–9.3)
NEUTROPHILS # BLD AUTO: 1.28 K/UL — LOW (ref 1.4–6.5)
NEUTROPHILS NFR BLD AUTO: 54.7 % — SIGNIFICANT CHANGE UP (ref 42.2–75.2)
NRBC BLD AUTO-RTO: 0 /100 WBCS — SIGNIFICANT CHANGE UP (ref 0–0)
PHOSPHATE SERPL-MCNC: 3.3 MG/DL — SIGNIFICANT CHANGE UP (ref 2.1–4.9)
PLATELET # BLD AUTO: 67 K/UL — LOW (ref 130–400)
PMV BLD: 10.9 FL — HIGH (ref 7.4–10.4)
POTASSIUM SERPL-MCNC: 3.9 MMOL/L — SIGNIFICANT CHANGE UP (ref 3.5–5)
POTASSIUM SERPL-SCNC: 3.9 MMOL/L — SIGNIFICANT CHANGE UP (ref 3.5–5)
PROT SERPL-MCNC: 5.4 G/DL — LOW (ref 6–8)
RBC # BLD: 3.03 M/UL — LOW (ref 4.7–6.1)
RBC # FLD: 13.2 % — SIGNIFICANT CHANGE UP (ref 11.5–14.5)
SODIUM SERPL-SCNC: 141 MMOL/L — SIGNIFICANT CHANGE UP (ref 135–146)
WBC # BLD: 2.34 K/UL — LOW (ref 4.8–10.8)
WBC # FLD AUTO: 2.34 K/UL — LOW (ref 4.8–10.8)

## 2025-07-17 PROCEDURE — 99232 SBSQ HOSP IP/OBS MODERATE 35: CPT

## 2025-07-17 PROCEDURE — 99291 CRITICAL CARE FIRST HOUR: CPT

## 2025-07-17 PROCEDURE — 93306 TTE W/DOPPLER COMPLETE: CPT | Mod: 26

## 2025-07-17 PROCEDURE — 71045 X-RAY EXAM CHEST 1 VIEW: CPT | Mod: 26

## 2025-07-17 PROCEDURE — 93010 ELECTROCARDIOGRAM REPORT: CPT

## 2025-07-17 RX ORDER — MINERAL OIL
133 OIL (ML) MISCELLANEOUS ONCE
Refills: 0 | Status: DISCONTINUED | OUTPATIENT
Start: 2025-07-17 | End: 2025-07-17

## 2025-07-17 RX ORDER — MAGNESIUM SULFATE 500 MG/ML
1 SYRINGE (ML) INJECTION ONCE
Refills: 0 | Status: COMPLETED | OUTPATIENT
Start: 2025-07-17 | End: 2025-07-17

## 2025-07-17 RX ORDER — APIXABAN 5 MG/1
1 TABLET, FILM COATED ORAL
Qty: 60 | Refills: 0
Start: 2025-07-17 | End: 2025-08-15

## 2025-07-17 RX ORDER — BISACODYL 5 MG
10 TABLET, DELAYED RELEASE (ENTERIC COATED) ORAL ONCE
Refills: 0 | Status: COMPLETED | OUTPATIENT
Start: 2025-07-17 | End: 2025-07-17

## 2025-07-17 RX ORDER — METOPROLOL SUCCINATE 50 MG/1
0.5 TABLET, EXTENDED RELEASE ORAL
Qty: 30 | Refills: 0
Start: 2025-07-17 | End: 2025-08-15

## 2025-07-17 RX ORDER — CLOPIDOGREL BISULFATE 75 MG/1
1 TABLET, FILM COATED ORAL
Refills: 0 | DISCHARGE

## 2025-07-17 RX ADMIN — Medication 20 MILLIEQUIVALENT(S): at 09:01

## 2025-07-17 RX ADMIN — INSULIN LISPRO 2: 100 INJECTION, SOLUTION INTRAVENOUS; SUBCUTANEOUS at 07:42

## 2025-07-17 RX ADMIN — Medication 3 MILLILITER(S): at 06:05

## 2025-07-17 RX ADMIN — Medication 100 MILLIEQUIVALENT(S): at 06:05

## 2025-07-17 RX ADMIN — HEPARIN SODIUM 3000 UNIT(S): 1000 INJECTION INTRAVENOUS; SUBCUTANEOUS at 06:04

## 2025-07-17 RX ADMIN — Medication 20 MILLIEQUIVALENT(S): at 10:42

## 2025-07-17 RX ADMIN — Medication 400 MILLIGRAM(S): at 06:05

## 2025-07-17 RX ADMIN — Medication 1000 MILLIGRAM(S): at 06:05

## 2025-07-17 RX ADMIN — INSULIN LISPRO 1: 100 INJECTION, SOLUTION INTRAVENOUS; SUBCUTANEOUS at 11:55

## 2025-07-17 RX ADMIN — Medication 100 GRAM(S): at 06:05

## 2025-07-17 RX ADMIN — Medication 81 MILLIGRAM(S): at 11:21

## 2025-07-17 RX ADMIN — CLOPIDOGREL BISULFATE 75 MILLIGRAM(S): 75 TABLET, FILM COATED ORAL at 11:21

## 2025-07-17 RX ADMIN — INSULIN LISPRO 1: 100 INJECTION, SOLUTION INTRAVENOUS; SUBCUTANEOUS at 02:45

## 2025-07-17 RX ADMIN — METOPROLOL SUCCINATE 12.5 MILLIGRAM(S): 50 TABLET, EXTENDED RELEASE ORAL at 06:04

## 2025-07-17 RX ADMIN — METOPROLOL SUCCINATE 12.5 MILLIGRAM(S): 50 TABLET, EXTENDED RELEASE ORAL at 11:21

## 2025-07-17 RX ADMIN — Medication 40 MILLIGRAM(S): at 06:04

## 2025-07-17 NOTE — DISCHARGE NOTE NURSING/CASE MANAGEMENT/SOCIAL WORK - PATIENT PORTAL LINK FT
You can access the FollowMyHealth Patient Portal offered by [facility] by registering at the following website: [email or web address]. By joining [employer]’s FollowMyHealth portal, you will also be able to view your health information using other applications (apps) compatible with our system.

## 2025-07-17 NOTE — DISCHARGE NOTE NURSING/CASE MANAGEMENT/SOCIAL WORK - FINANCIAL ASSISTANCE
[facility] provides services at a reduced cost to those who are determined to be eligible through [facility]’s financial assistance program. Information regarding [facility]’s financial assistance program can be found by going to [email or web address] or by calling [phone].

## 2025-07-17 NOTE — PROGRESS NOTE ADULT - NS ATTEND AMEND GEN_ALL_CORE FT
Patient seen and examined along with the team in the ICU    Postop day 1  Status post transcatheter aortic valve replacement    Doing very well  Hemodynamically stable  Neurologically intact    Denies any complaints    He did have some brief atrial fibrillation postprocedure but this morning he is in sinus rhythm    Has not required any pacing overnight    Groins are clean and dry without significant hematomas    Overall the patient is making an excellent recovery    The patient, the family and the care team updated regarding the plan and progress

## 2025-07-17 NOTE — DIETITIAN INITIAL EVALUATION ADULT - ORAL INTAKE PTA/DIET HISTORY
Pt reports having a good appetite prior to admit; consumed 3 meals daily. Pt takes vitamin and mineral supplements, however does not recall specific names. Denies drinking protein shake supplements. NKFA; denies any restrictive cultural or [employer] food preferences. Pt states "I drink salt with water every day to stay healthy".

## 2025-07-17 NOTE — DIETITIAN INITIAL EVALUATION ADULT - ADD RECOMMEND
1. Continue with current diet order; if appetites declines, supplement with Ensure Max (150 kcal, 30g pro per carton)  2. Encourage PO intake    Low risk f/u

## 2025-07-17 NOTE — DIETITIAN INITIAL EVALUATION ADULT - OTHER INFO
Pertinent Medical Information: Per H&P, pt is an 85 year old male with PMH of HTN, DLD, CAD with recent pci stent, and hx of aortic stenosis, presented with worsening shortness of breath and chest pain and was found to have progression of his aortic stenosis, NYHA Class II. On 7/15/2025, patient underwent elective TAVR. S/p TAVR.     Pertinent Subjective Information: Pt reports having a good appetite; consumed 100% of meal today. Tolerating diet texture/consistency well. No nausea or vomiting reported.     Weight hx: #/95 KG -- checked 1 month ago per pt. Current dosing weight is 95.3 KG. Weight stable within 1 month at this time. Pt does not meet weight criteria for malnutrition.

## 2025-07-17 NOTE — DIETITIAN INITIAL EVALUATION ADULT - PERTINENT MEDS FT
MEDICATIONS  (STANDING):  aspirin  chewable 81 milliGRAM(s) Oral daily  chlorhexidine 2% Cloths 1 Application(s) Topical daily  clopidogrel Tablet 75 milliGRAM(s) Oral daily  dextrose 5%. 1000 milliLiter(s) (100 mL/Hr) IV Continuous <Continuous>  dextrose 5%. 1000 milliLiter(s) (50 mL/Hr) IV Continuous <Continuous>  dextrose 50% Injectable 25 Gram(s) IV Push once  dextrose 50% Injectable 12.5 Gram(s) IV Push once  dextrose 50% Injectable 25 Gram(s) IV Push once  glucagon  Injectable 1 milliGRAM(s) IntraMuscular once  heparin   Injectable 3000 Unit(s) SubCutaneous every 12 hours  insulin lispro (ADMELOG) corrective regimen sliding scale   SubCutaneous three times a day before meals  insulin lispro (ADMELOG) corrective regimen sliding scale   SubCutaneous at bedtime  meperidine     Injectable 25 milliGRAM(s) IV Push once  metoprolol tartrate 12.5 milliGRAM(s) Oral every 6 hours  pantoprazole  Injectable 40 milliGRAM(s) IV Push two times a day  sodium chloride 0.9% lock flush 3 milliLiter(s) IV Push every 8 hours  sodium chloride 0.9%. 1000 milliLiter(s) (10 mL/Hr) IV Continuous <Continuous>  tamsulosin 0.4 milliGRAM(s) Oral at bedtime    MEDICATIONS  (PRN):  dextrose Oral Gel 15 Gram(s) Oral once PRN Blood Glucose LESS THAN 70 milliGRAM(s)/deciliter  mineral oil enema 133 milliLiter(s) Rectal once PRN constipation

## 2025-07-17 NOTE — PROGRESS NOTE ADULT - SUBJECTIVE AND OBJECTIVE BOX
CTU Attending Progress Daily Note     16 Jul 2025 18:43  Procedure: TAVR on 07-16-25      History of DM (diabetes mellitus)    HTN (hypertension)    BPH (benign prostatic hyperplasia)    Malaria    Schistosomosis    History of viral hepatitis      HPI:  Mr. MARKUS OSULLIVAN is 85 year old male with PMH of HTN, DLD, CAD with recent pci stent, and hx of aortic stenosis, presented with worsening shortness of breath and chest pain and was found to have progression of his aortic stenosis, NYHA Class II. On 7/15/2025, patient underwent elective TAVR. Prior to TAVR, labs were reviewed.  Chronic mildly elevated transaminase and mild thrombocytopenia (macrocytic anemia) dating back approximately a decade.  Cirrhosis incidentally noted on CT.  Normal AST, INR, bilirubin. Due to longstanding liver disease without recent GI follow up, hepatology was consulted to evaluate for esophageal varices in event SHAHID is requiring during TAVR procedure. TAVR procedure is on hold, however pt is being admitted ro endoscopy and GI work up.       (15 Jul 2025 13:06)        Hospital Course:  7/16 TAVR    OBJECTIVE:  ICU Vital Signs Last 24 Hrs  T(C): 36.4 (16 Jul 2025 15:25), Max: 36.7 (15 Jul 2025 20:00)  T(F): 97.5 (16 Jul 2025 15:14), Max: 98 (15 Jul 2025 20:00)  HR: 74 (16 Jul 2025 15:25) (71 - 99)  BP: 126/59 (16 Jul 2025 15:25) (124/68 - 165/77)  BP(mean): 85 (16 Jul 2025 15:25) (85 - 110)  ABP: --  ABP(mean): --  RR: 24 (16 Jul 2025 15:25) (19 - 34)  SpO2: 97% (16 Jul 2025 15:25) (88% - 98%)    O2 Parameters below as of 16 Jul 2025 15:00  Patient On (Oxygen Delivery Method): room air          I&O's Summary    15 Jul 2025 07:01  -  16 Jul 2025 07:00  --------------------------------------------------------  IN: 450 mL / OUT: 1650 mL / NET: -1200 mL    16 Jul 2025 07:01  -  16 Jul 2025 18:43  --------------------------------------------------------  IN: 0 mL / OUT: 1 mL / NET: -1 mL      I&O's Detail    15 Jul 2025 07:01  -  16 Jul 2025 07:00  --------------------------------------------------------  IN:    Oral Fluid: 450 mL  Total IN: 450 mL    OUT:    Voided (mL): 1650 mL  Total OUT: 1650 mL    Total NET: -1200 mL      16 Jul 2025 07:01  -  16 Jul 2025 18:43  --------------------------------------------------------  IN:  Total IN: 0 mL    OUT:    Voided (mL): 1 mL  Total OUT: 1 mL    Total NET: -1 mL        Adult Advanced Hemodynamics Last 24 Hrs  CVP(mm Hg): --  CVP(cm H2O): --  CO: --  CI: --  PA: --  PA(mean): --  PCWP: --  SVR: --  SVRI: --  PVR: --  PVRI: --    CAPILLARY BLOOD GLUCOSE      POCT Blood Glucose.: 155 mg/dL (16 Jul 2025 13:12)  POCT Blood Glucose.: 222 mg/dL (16 Jul 2025 06:31)  POCT Blood Glucose.: 298 mg/dL (15 Jul 2025 21:20)    LABS:                          10.7   2.24  )-----------( 74       ( 16 Jul 2025 02:35 )             32.0     07-16    139  |  106  |  15  ----------------------------<  216[H]  4.4   |  21  |  0.9    Ca    9.0      16 Jul 2025 02:35  Mg     1.8     07-16    TPro  6.9  /  Alb  4.0  /  TBili  0.3  /  DBili  x   /  AST  81[H]  /  ALT  59[H]  /  AlkPhos  120[H]  07-16      Urinalysis Basic - ( 16 Jul 2025 02:35 )    Color: x / Appearance: x / SG: x / pH: x  Gluc: 216 mg/dL / Ketone: x  / Bili: x / Urobili: x   Blood: x / Protein: x / Nitrite: x   Leuk Esterase: x / RBC: x / WBC x   Sq Epi: x / Non Sq Epi: x / Bacteria: x        Home Medications:  aspirin 81 mg oral capsule: 1 cap(s) orally (15 Jul 2025 09:12)  benzonatate 200 mg oral capsule: 1 cap(s) orally (15 Jul 2025 09:12)  ezetimibe 10 mg oral tablet: 1 tab(s) orally (15 Jul 2025 09:12)  Flomax 0.4 mg oral capsule: 1 cap(s) orally once a day (15 Jul 2025 09:12)  Insulin Glargine:  (15 Jul 2025 09:12)  isosorbide mononitrate 30 mg oral tablet, extended release: 1 tab(s) orally once a day (in the morning) (15 Jul 2025 09:12)  Ozempic 2 mg/1.5 mL (0.25 mg or 0.5 mg dose) subcutaneous solution: 0.5 milligram(s) subcutaneously (15 Jul 2025 09:12)  Plavix 75 mg oral tablet: 1 tab(s) orally (15 Jul 2025 09:15)  rosuvastatin 20 mg oral capsule: 1 cap(s) orally once a day (at bedtime) (15 Jul 2025 09:15)  SITagliptin 100 mg oral tablet: 1 tab(s) orally once a day (15 Jul 2025 09:12)    HOSPITAL MEDICATIONS:  MEDICATIONS  (STANDING):  acetaminophen   IVPB .. 1000 milliGRAM(s) IV Intermittent once  aspirin  chewable 81 milliGRAM(s) Oral daily  chlorhexidine 0.12% Liquid 15 milliLiter(s) Oral Mucosa every 12 hours  clopidogrel Tablet 75 milliGRAM(s) Oral daily  dextrose 5%. 1000 milliLiter(s) (100 mL/Hr) IV Continuous <Continuous>  dextrose 5%. 1000 milliLiter(s) (50 mL/Hr) IV Continuous <Continuous>  dextrose 50% Injectable 25 Gram(s) IV Push once  dextrose 50% Injectable 12.5 Gram(s) IV Push once  dextrose 50% Injectable 25 Gram(s) IV Push once  glucagon  Injectable 1 milliGRAM(s) IntraMuscular once  heparin   Injectable 5000 Unit(s) SubCutaneous every 8 hours  insulin lispro (ADMELOG) corrective regimen sliding scale   SubCutaneous three times a day before meals  insulin lispro (ADMELOG) corrective regimen sliding scale   SubCutaneous at bedtime  meperidine     Injectable 25 milliGRAM(s) IV Push once  niCARdipine Infusion 5 mG/Hr (25 mL/Hr) IV Continuous <Continuous>  norepinephrine Infusion 0.05 MICROgram(s)/kG/Min (8.93 mL/Hr) IV Continuous <Continuous>  pantoprazole  Injectable 40 milliGRAM(s) IV Push two times a day  sodium chloride 0.9% lock flush 3 milliLiter(s) IV Push every 8 hours  sodium chloride 0.9%. 1000 milliLiter(s) (10 mL/Hr) IV Continuous <Continuous>  tamsulosin 0.4 milliGRAM(s) Oral at bedtime    MEDICATIONS  (PRN):  dextrose Oral Gel 15 Gram(s) Oral once PRN Blood Glucose LESS THAN 70 milliGRAM(s)/deciliter    RADIOLOGY:  Chest X-ray Reviewed    REVIEW OF SYSTEMS:  CONSTITUTIONAL: [X] all negative; [ ] weakness, [ ] fevers, [ ] chills  EYES/ENT: [X] all negative; [ ] visual changes, [ ] vertigo, [ ] throat pain   NECK: [X] all negative; [ ] pain, [ ] stiffness  RESPIRATORY: [] all negative, [ ] cough, [ ] wheezing, [ ] hemoptysis, [ ] shortness of breath  CARDIOVASCULAR: [] all negative; [ ] chest pain, [ ] palpitations, [ ] orthopnea  GASTROINTESTINAL: [X] all negative; [ ]abdominal pain, [ ] nausea, [ ] vomiting, [ ] hematemesis, [ ] diarrhea, [ ] constipation, [ ] melena, [ ] hematochezia.  GENITOURINARY: [X] all negative; [ ] dysuria, [ ] frequency, [ ] hematuria  NEUROLOGICAL: [X] all negative; [ ] numbness, [ ] weakness  SKIN: [X] all negative; [ ] itching, [ ] burning, [ ] rashes, [ ] lesions   All other review of systems is negative unless indicated above.  [  ] Unable to assess ROS because     PHYSICAL EXAM:          CONSTITUTIONAL: Well-developed; well-nourished; in no acute distress.   	SKIN: warm, dry  	HEAD: Normocephalic; atraumatic.  	EYES: PERRL, EOM, no conj injection, sclera clear  	ENT: No nasal discharge; airway clear.  	NECK: Supple; non tender.   	CARD: S1, S2 normal; no murmurs, gallops, or rubs. Regular rate and rhythm.  no carotid bruits  	RESP: CTA B/L; good air movement No wheezes, rales or rhonchi.  	ABD: Soft, not tender, not distended,  no rebound or guarding, bowel sounds present  	EXT: Normal ROM.  No clubbing, cyanosis or edema.   warm and well perfused.  pulses palpable;  Groin sites C/D/I, no hematoma  	NEURO: Alert, awake, motor 5/5 R, 5/5 L    Assessment  85y  S/p TAVR    Plan:  Monitor neuro status post optes   Pain control as needed    S/p TAVR  Monitor rhythm  EKG post op and in 4hrs  Telemetry monitoring  TVP in place - pacing as needed  Monitor for coronary ischemia  Monitor groin sites for bleeding  Post op labs to check for bleeding / coagulopathy  Volume resuscitation as needed  Echo tomorrow  Cardene as needed for Systolic < 140s  Resume home BP meds when able  Resume home AC if applicable tomorrow    Oxygen via NRB  Wean to NC as tolerated  IS / Chest PT / OOBTC / Ambulate    Cirrhosis - no varices  Prior hepatitis  Low platelets  Monitor for bleeding  Advance diet once awake  Bowel regimen  GI PPx    High risk for GUERDA post procedure  Monitor UOP / lytes / creatinine    Periop ppx abx    DVT ppx    Upon my evaluation, the above patient has a high probability of imminent or life threatening deterioration due to a high risk for Shock, Cardiogenic shock, arrhythmias acute blood loss, acute kidney injury and acute pulmonary insufficiency which required my direct attention, intervention, and personal management.  Total critical care time documented below includes review of radiology results, ordering, interpreting and reviewing diagnostic studies / lab tests with immediate assessment and treatment, consultant collaboration on findings and treatment options, monitoring for potential decompensation, obtaining history from family, EMT, nursing home staff and/or treating physicians; directing the titration of pressors, oxygen support devices, fluid resuscitation interpretation of cardiac output measurements, interpretation of radiological assessments, interpretation of EKG / rhythm strips, telemetry.  This time did not overlap with the management of other patients or performing separately reportable procedures.      Management of this patient was discussed with the CT surgery attending and mid-level providers.    I acknowledge the use of copied documentation.  All copy forward documentation is my own and has been reviewed and revised as appropriate.  If no changes were made, it is because that information remains the same. CTU Attending Progress Daily Note     16 Jul 2025 18:43  Procedure: TAVR on 07-16-25      History of DM (diabetes mellitus)    HTN (hypertension)    BPH (benign prostatic hyperplasia)    Malaria    Schistosomosis    History of viral hepatitis      HPI:  Mr. MARKUS OSULLIVAN is 85 year old male with PMH of HTN, DLD, CAD with recent pci stent, and hx of aortic stenosis, presented with worsening shortness of breath and chest pain and was found to have progression of his aortic stenosis, NYHA Class II. On 7/15/2025, patient underwent elective TAVR. Prior to TAVR, labs were reviewed.  Chronic mildly elevated transaminase and mild thrombocytopenia (macrocytic anemia) dating back approximately a decade.  Cirrhosis incidentally noted on CT.  Normal AST, INR, bilirubin. Due to longstanding liver disease without recent GI follow up, hepatology was consulted to evaluate for esophageal varices in event SHAHID is requiring during TAVR procedure. TAVR procedure is on hold, however pt is being admitted ro endoscopy and GI work up.       (15 Jul 2025 13:06)        Hospital Course:  7/16 TAVR  OBJECTIVE:  ICU Vital Signs Last 24 Hrs  T(C): 36.1 (16 Jul 2025 23:00), Max: 36.4 (16 Jul 2025 15:14)  T(F): 97 (16 Jul 2025 23:00), Max: 97.5 (16 Jul 2025 15:14)  HR: 77 (17 Jul 2025 10:00) (71 - 116)  BP: 122/58 (17 Jul 2025 10:00) (93/56 - 163/91)  BP(mean): 90 (17 Jul 2025 09:00) (69 - 120)  ABP: 117/43 (17 Jul 2025 09:00) (92/49 - 174/71)  ABP(mean): 70 (17 Jul 2025 09:00) (57 - 107)  RR: 22 (17 Jul 2025 10:00) (16 - 28)  SpO2: 95% (17 Jul 2025 10:00) (88% - 99%)    O2 Parameters below as of 17 Jul 2025 10:00  Patient On (Oxygen Delivery Method): room air          I&O's Summary    16 Jul 2025 07:01  -  17 Jul 2025 07:00  --------------------------------------------------------  IN: 958.7 mL / OUT: 1576 mL / NET: -617.3 mL    17 Jul 2025 07:01  -  17 Jul 2025 11:34  --------------------------------------------------------  IN: 240 mL / OUT: 200 mL / NET: 40 mL      I&O's Detail    16 Jul 2025 07:01  -  17 Jul 2025 07:00  --------------------------------------------------------  IN:    IV PiggyBack: 450 mL    Norepinephrine: 18.7 mL    Oral Fluid: 360 mL    sodium chloride 0.9%: 130 mL  Total IN: 958.7 mL    OUT:    Voided (mL): 1576 mL  Total OUT: 1576 mL    Total NET: -617.3 mL      17 Jul 2025 07:01  -  17 Jul 2025 11:34  --------------------------------------------------------  IN:    Oral Fluid: 240 mL  Total IN: 240 mL    OUT:    Voided (mL): 200 mL  Total OUT: 200 mL    Total NET: 40 mL        Adult Advanced Hemodynamics Last 24 Hrs  CVP(mm Hg): --  CVP(cm H2O): --  CO: --  CI: --  PA: --  PA(mean): --  PCWP: --  SVR: --  SVRI: --  PVR: --  PVRI: --    CAPILLARY BLOOD GLUCOSE      POCT Blood Glucose.: 234 mg/dL (17 Jul 2025 07:05)  POCT Blood Glucose.: 253 mg/dL (17 Jul 2025 02:42)  POCT Blood Glucose.: 184 mg/dL (16 Jul 2025 18:42)  POCT Blood Glucose.: 155 mg/dL (16 Jul 2025 13:12)    LABS:  ABG - ( 16 Jul 2025 18:47 )  pH, Arterial: 7.37  pH, Blood: x     /  pCO2: 33    /  pO2: 140   / HCO3: 19    / Base Excess: -5.4  /  SaO2: 98.3                                    9.5    2.34  )-----------( 67       ( 17 Jul 2025 03:23 )             28.7     07-17    141  |  107  |  16  ----------------------------<  238[H]  3.9   |  22  |  0.9    Ca    7.9[L]      17 Jul 2025 03:23  Phos  3.3     07-17  Mg     1.8     07-17    TPro  5.4[L]  /  Alb  3.2[L]  /  TBili  0.3  /  DBili  x   /  AST  111[H]  /  ALT  54[H]  /  AlkPhos  94  07-17    PT/INR - ( 16 Jul 2025 18:49 )   PT: 15.60 sec;   INR: 1.31 ratio         PTT - ( 16 Jul 2025 18:49 )  PTT:31.8 sec  Urinalysis Basic - ( 17 Jul 2025 03:23 )    Color: x / Appearance: x / SG: x / pH: x  Gluc: 238 mg/dL / Ketone: x  / Bili: x / Urobili: x   Blood: x / Protein: x / Nitrite: x   Leuk Esterase: x / RBC: x / WBC x   Sq Epi: x / Non Sq Epi: x / Bacteria: x        Home Medications:  aspirin 81 mg oral capsule: 1 cap(s) orally (15 Jul 2025 09:12)  benzonatate 200 mg oral capsule: 1 cap(s) orally (15 Jul 2025 09:12)  ezetimibe 10 mg oral tablet: 1 tab(s) orally (15 Jul 2025 09:12)  Flomax 0.4 mg oral capsule: 1 cap(s) orally once a day (15 Jul 2025 09:12)  Insulin Glargine:  (15 Jul 2025 09:12)  isosorbide mononitrate 30 mg oral tablet, extended release: 1 tab(s) orally once a day (in the morning) (15 Jul 2025 09:12)  Ozempic 2 mg/1.5 mL (0.25 mg or 0.5 mg dose) subcutaneous solution: 0.5 milligram(s) subcutaneously (15 Jul 2025 09:12)  Plavix 75 mg oral tablet: 1 tab(s) orally (15 Jul 2025 09:15)  rosuvastatin 20 mg oral capsule: 1 cap(s) orally once a day (at bedtime) (15 Jul 2025 09:15)  SITagliptin 100 mg oral tablet: 1 tab(s) orally once a day (15 Jul 2025 09:12)    HOSPITAL MEDICATIONS:  MEDICATIONS  (STANDING):  aspirin  chewable 81 milliGRAM(s) Oral daily  bisacodyl Suppository 10 milliGRAM(s) Rectal once  chlorhexidine 2% Cloths 1 Application(s) Topical daily  clopidogrel Tablet 75 milliGRAM(s) Oral daily  dextrose 5%. 1000 milliLiter(s) (100 mL/Hr) IV Continuous <Continuous>  dextrose 5%. 1000 milliLiter(s) (50 mL/Hr) IV Continuous <Continuous>  dextrose 50% Injectable 25 Gram(s) IV Push once  dextrose 50% Injectable 12.5 Gram(s) IV Push once  dextrose 50% Injectable 25 Gram(s) IV Push once  glucagon  Injectable 1 milliGRAM(s) IntraMuscular once  heparin   Injectable 3000 Unit(s) SubCutaneous every 12 hours  insulin lispro (ADMELOG) corrective regimen sliding scale   SubCutaneous three times a day before meals  insulin lispro (ADMELOG) corrective regimen sliding scale   SubCutaneous at bedtime  meperidine     Injectable 25 milliGRAM(s) IV Push once  metoprolol tartrate 12.5 milliGRAM(s) Oral every 6 hours  pantoprazole  Injectable 40 milliGRAM(s) IV Push two times a day  sodium chloride 0.9% lock flush 3 milliLiter(s) IV Push every 8 hours  sodium chloride 0.9%. 1000 milliLiter(s) (10 mL/Hr) IV Continuous <Continuous>  tamsulosin 0.4 milliGRAM(s) Oral at bedtime    MEDICATIONS  (PRN):  dextrose Oral Gel 15 Gram(s) Oral once PRN Blood Glucose LESS THAN 70 milliGRAM(s)/deciliter  mineral oil enema 133 milliLiter(s) Rectal once PRN constipation    RADIOLOGY:  Chest X-ray Reviewed    REVIEW OF SYSTEMS:  CONSTITUTIONAL: [X] all negative; [ ] weakness, [ ] fevers, [ ] chills  EYES/ENT: [X] all negative; [ ] visual changes, [ ] vertigo, [ ] throat pain   NECK: [X] all negative; [ ] pain, [ ] stiffness  RESPIRATORY: [] all negative, [ ] cough, [ ] wheezing, [ ] hemoptysis, [ ] shortness of breath  CARDIOVASCULAR: [] all negative; [ ] chest pain, [ ] palpitations, [ ] orthopnea  GASTROINTESTINAL: [X] all negative; [ ]abdominal pain, [ ] nausea, [ ] vomiting, [ ] hematemesis, [ ] diarrhea, [ ] constipation, [ ] melena, [ ] hematochezia.  GENITOURINARY: [X] all negative; [ ] dysuria, [ ] frequency, [ ] hematuria  NEUROLOGICAL: [X] all negative; [ ] numbness, [ ] weakness  SKIN: [X] all negative; [ ] itching, [ ] burning, [ ] rashes, [ ] lesions   All other review of systems is negative unless indicated above.  [  ] Unable to assess ROS because       PHYSICAL EXAM:          CONSTITUTIONAL: Well-developed; well-nourished; in no acute distress.   	SKIN: warm, dry  	HEAD: Normocephalic; atraumatic.  	EYES: PERRL, EOM, no conj injection, sclera clear  	ENT: No nasal discharge; airway clear.  	NECK: Supple; non tender.   	CARD: S1, S2 normal; no murmurs, gallops, or rubs. Regular rate and rhythm.  no carotid bruits  	RESP: CTA B/L; good air movement No wheezes, rales or rhonchi.  	ABD: Soft, not tender, not distended,  no rebound or guarding, bowel sounds present  	EXT: Normal ROM.  No clubbing, cyanosis or edema.   warm and well perfused.  pulses palpable;  Groin sites C/D/I, no hematoma  	NEURO: Alert, awake, motor 5/5 R, 5/5 L    Assessment  85y  S/p TAVR POD#2    EKG NSR   yestrday was in a flutter       Plan:  Monitor neuro status post optes   Pain control as needed    S/p TAVR  Monitor rhythm  -A flutter now RSR after BB started yestrday   Telemetry monitoring  d/c TVP   Monitor for coronary ischemia  Monitor groin sites for bleeding  Post op labs to check for bleeding / coagulopathy  Volume resuscitation as needed  Echo tomorrow    Resume home BP meds when able  as per Dr kaye  pt can go home on Eliquis and ASA no plavic     Oxygen via NRB  Wean to NC as tolerated  IS / Chest PT / OOBTC / Ambulate    Cirrhosis - no varices  Prior hepatitis  Low platelets  Monitor for bleeding  Advance diet once awake  Bowel regimen  GI PPx    High risk for GUERDA post procedure  Monitor UOP / lytes / creatinine    Periop ppx abx    DVT ppx    Upon my evaluation, the above patient has a high probability of imminent or life threatening deterioration due to a high risk for Shock, Cardiogenic shock, arrhythmias acute blood loss, acute kidney injury and acute pulmonary insufficiency which required my direct attention, intervention, and personal management.  Total critical care time documented below includes review of radiology results, ordering, interpreting and reviewing diagnostic studies / lab tests with immediate assessment and treatment, consultant collaboration on findings and treatment options, monitoring for potential decompensation, obtaining history from family, EMT, nursing home staff and/or treating physicians; directing the titration of pressors, oxygen support devices, fluid resuscitation interpretation of cardiac output measurements, interpretation of radiological assessments, interpretation of EKG / rhythm strips, telemetry.  This time did not overlap with the management of other patients or performing separately reportable procedures.      Management of this patient was discussed with the CT surgery attending and mid-level providers.    I acknowledge the use of copied documentation.  All copy forward documentation is my own and has been reviewed and revised as appropriate.  If no changes were made, it is because that information remains the same.

## 2025-07-17 NOTE — DIETITIAN INITIAL EVALUATION ADULT - NAME AND PHONE
Zoya [phone] or TEAMS    Nutrition Interventions: Meals, snacks, supplements; Nutrition Monitoring: Diet order, PO intake, weights, labs, NFPF, body composition, BM and tolerance to medical food supplements

## 2025-07-17 NOTE — DIETITIAN INITIAL EVALUATION ADULT - NS FNS DIET ORDER
Diet, DASH/TLC:   Sodium & Cholesterol Restricted  Consistent Carbohydrate {No Snacks} (CSTCHO) (07-17-25 @ 08:38)

## 2025-07-17 NOTE — PROGRESS NOTE ADULT - SUBJECTIVE AND OBJECTIVE BOX
OPERATIVE PROCEDURE(s):                POD #                       SURGEON(s): CONCEPCION Fry    HPI:  Mr. AMRKUS OSULLIVAN is 85 year old male with PMH of HTN, DLD, CAD with recent pci stent, and hx of aortic stenosis, presented with worsening shortness of breath and chest pain and was found to have progression of his aortic stenosis, NYHA Class II. On 7/15/2025, patient underwent elective TAVR. Prior to TAVR, labs were reviewed.  Chronic mildly elevated transaminase and mild thrombocytopenia (macrocytic anemia) dating back approximately a decade.  Cirrhosis incidentally noted on CT.  Normal AST, INR, bilirubin. Due to longstanding liver disease without recent GI follow up, hepatology was consulted to evaluate for esophageal varices in event SHAHID is requiring during TAVR procedure. TAVR procedure is on hold, however pt is being admitted ro endoscopy and GI work up.       (15 Jul 2025 13:06)      SUBJECTIVE ASSESSMENT:85yMale patient seen and examined at bedside.    Vital Signs Last 24 Hrs  T(F): 97 (16 Jul 2025 23:00), Max: 97.5 (16 Jul 2025 15:14)  HR: 80 (17 Jul 2025 07:00) (71 - 116)  BP: 124/59 (17 Jul 2025 07:00) (93/56 - 163/91)  BP(mean): 85 (17 Jul 2025 07:00) (69 - 120)  ABP: 112/53 (17 Jul 2025 07:00) (92/49 - 174/71)  ABP(mean): 76 (17 Jul 2025 07:00)  RR: 20 (17 Jul 2025 07:00) (16 - 32)  SpO2: 97% (17 Jul 2025 07:00) (88% - 99%)  CVP(mm Hg): --  CVP(cm H2O): --  CO: --  CI: --  PA: --  SVR: --    I&O's Detail    16 Jul 2025 07:01  -  17 Jul 2025 07:00  --------------------------------------------------------  IN:    IV PiggyBack: 450 mL    Norepinephrine: 18.7 mL    Oral Fluid: 360 mL    sodium chloride 0.9%: 130 mL  Total IN: 958.7 mL    OUT:    Voided (mL): 1576 mL  Total OUT: 1576 mL        Net: I&O's Detail    15 Jul 2025 07:01  -  16 Jul 2025 07:00  --------------------------------------------------------  Total NET: -1200 mL      16 Jul 2025 07:01  -  17 Jul 2025 07:00  --------------------------------------------------------  Total NET: -617.3 mL        CAPILLARY BLOOD GLUCOSE      POCT Blood Glucose.: 234 mg/dL (17 Jul 2025 07:05)  POCT Blood Glucose.: 253 mg/dL (17 Jul 2025 02:42)  POCT Blood Glucose.: 184 mg/dL (16 Jul 2025 18:42)  POCT Blood Glucose.: 155 mg/dL (16 Jul 2025 13:12)    A1C with Estimated Average Glucose Result: 6.8 % (07-01-25 @ 13:08)      Physical Exam:  General: NAD; A&Ox3  Neuro: pupils equal and reactive, speech clear, no overt sensory or motor deficts  Cardiac: S1/S2, RRR, no murmur, no rubs  Lungs: unlabored respirations, CTA b/l, no wheeze, no rales, no crackles  Abdomen: Soft/NT/ND; positive bowel sounds x 4  Sternum: Intact, no click, incision healing well with no drainage  Incisions: Incisions clean/dry/intact  Extremities: No edema b/l lower extremities; good capillary refill; no cyanosis; palpable 1+ pedal pulses b/l    Central Venous Catheter: Yes: critical illness, intravenous access  Day #  [place] Catheter: Yes: critical illness; monitor strict i/o's                    Day #  EPICARDIAL WIRES:  YES                                                                         Day #  BOWEL MOVEMENT:  [] YES [] NO, If No, Timing since last BM Day #  CHEST TUBE(MS/Left/Right):  [] YES [] NO, If yes -  AIR LEAKS:  YES/NO        LABS:                        9.5[L]  2.34[L] )-----------( 67[L]    ( 17 Jul 2025 03:23 )             28.7[L]                        10.4[L]  3.44[L] )-----------( 71[L]    ( 16 Jul 2025 18:49 )             31.1[L]    07-17    141  |  107  |  16  ----------------------------<  238[H]  3.9   |  22  |  0.9  07-16    137  |  105  |  15  ----------------------------<  161[H]  3.9   |  19  |  0.8    Ca    7.9[L]      17 Jul 2025 03:23  Phos  3.3     07-17  Mg     1.8     07-17    TPro  5.4[L] [6.0 - 8.0]  /  Alb  3.2[L] [3.5 - 5.2]  /  TBili  0.3 [0.2 - 1.2]  /  DBili  x   /  AST  111[H] [0 - 41]  /  ALT  54[H] [0 - 41]  /  AlkPhos  94 [30 - 115]  07-17    PT/INR - ( 16 Jul 2025 18:49 )   PT: ;   INR: 1.31 ratio         PTT - ( 16 Jul 2025 18:49 )  PTT:31.8 sec  Urinalysis Basic - ( 17 Jul 2025 03:23 )    Color: x / Appearance: x / SG: x / pH: x  Gluc: 238 mg/dL / Ketone: x  / Bili: x / Urobili: x   Blood: x / Protein: x / Nitrite: x   Leuk Esterase: x / RBC: x / WBC x   Sq Epi: x / Non Sq Epi: x / Bacteria: x      ABG - ( 16 Jul 2025 18:47 )  pH: 7.37  /  pCO2: 33    /  pO2: 140   / HCO3: 19    / Base Excess: -5.4  /  SaO2: 98.3  /  LA: 1.5              RADIOLOGY & ADDITIONAL TESTS:  CXR:   EKG:    Allergies    No Known Allergies    Intolerances      MEDICATIONS  (STANDING):  aspirin  chewable 81 milliGRAM(s) Oral daily  chlorhexidine 0.12% Liquid 15 milliLiter(s) Oral Mucosa every 12 hours  clopidogrel Tablet 75 milliGRAM(s) Oral daily  dextrose 5%. 1000 milliLiter(s) (100 mL/Hr) IV Continuous <Continuous>  dextrose 5%. 1000 milliLiter(s) (50 mL/Hr) IV Continuous <Continuous>  dextrose 50% Injectable 25 Gram(s) IV Push once  dextrose 50% Injectable 12.5 Gram(s) IV Push once  dextrose 50% Injectable 25 Gram(s) IV Push once  glucagon  Injectable 1 milliGRAM(s) IntraMuscular once  heparin   Injectable 3000 Unit(s) SubCutaneous every 12 hours  insulin lispro (ADMELOG) corrective regimen sliding scale   SubCutaneous three times a day before meals  insulin lispro (ADMELOG) corrective regimen sliding scale   SubCutaneous at bedtime  meperidine     Injectable 25 milliGRAM(s) IV Push once  metoprolol tartrate 12.5 milliGRAM(s) Oral every 6 hours  niCARdipine Infusion 5 mG/Hr (25 mL/Hr) IV Continuous <Continuous>  norepinephrine Infusion 0.05 MICROgram(s)/kG/Min (8.93 mL/Hr) IV Continuous <Continuous>  pantoprazole  Injectable 40 milliGRAM(s) IV Push two times a day  sodium chloride 0.9% lock flush 3 milliLiter(s) IV Push every 8 hours  sodium chloride 0.9%. 1000 milliLiter(s) (10 mL/Hr) IV Continuous <Continuous>  tamsulosin 0.4 milliGRAM(s) Oral at bedtime    MEDICATIONS  (PRN):  dextrose Oral Gel 15 Gram(s) Oral once PRN Blood Glucose LESS THAN 70 milliGRAM(s)/deciliter    Home Medications:  aspirin 81 mg oral capsule: 1 cap(s) orally (15 Jul 2025 09:12)  benzonatate 200 mg oral capsule: 1 cap(s) orally (15 Jul 2025 09:12)  ezetimibe 10 mg oral tablet: 1 tab(s) orally (15 Jul 2025 09:12)  Flomax 0.4 mg oral capsule: 1 cap(s) orally once a day (15 Jul 2025 09:12)  Insulin Glargine:  (15 Jul 2025 09:12)  isosorbide mononitrate 30 mg oral tablet, extended release: 1 tab(s) orally once a day (in the morning) (15 Jul 2025 09:12)  Ozempic 2 mg/1.5 mL (0.25 mg or 0.5 mg dose) subcutaneous solution: 0.5 milligram(s) subcutaneously (15 Jul 2025 09:12)  Plavix 75 mg oral tablet: 1 tab(s) orally (15 Jul 2025 09:15)  rosuvastatin 20 mg oral capsule: 1 cap(s) orally once a day (at bedtime) (15 Jul 2025 09:15)  SITagliptin 100 mg oral tablet: 1 tab(s) orally once a day (15 Jul 2025 09:12)      Pharmacologic DVT Prophylaxis [] YES, [] NO: Contraindication:  SCD's: YES b/l    GI Prophylaxis:     Post-Op Beta-Blockers: [] Yes, [] No: contraindication:  Post-Op CCB: [] Yes, [] No: contraindication:  Post-Op Aspirin:  [] Yes, [] No: contraindication:  Post-Op Statin:  [] Yes, [] No: contraindication:    Ambulation/Activity Status:    Assessment/Plan:  85y Male status-post  - Case and plan discussed with CTU Intensivist and CT Surgeon - Dr. Tang/Kylie/Lisandra/Chuck  - Continue CTU supportive care and ongoing plan of care as per continuing CTU rounds.   - Continue DVT/GI prophylaxis  - Incentive Spirometry 10 times an hour  - Continue to advance physical activity as tolerated and continue PT/OT as directed  1. CAD s/p CABG: Continue ASA, statin, BB  2. HTN:   3. Post-op A. Fib ppx:   4. COPD/Hypoxia:   5. DM/Glucose Control:     Social Service Disposition:     OPERATIVE PROCEDURE(s): TAVR               POD #  1                     SURGEON(s): CONCEPCION Fry      SUBJECTIVE ASSESSMENT:85yMale patient seen and examined at bedside. Afib intraop TAVR, converted to NSR overnight. Eager to go home    Vital Signs Last 24 Hrs  T(F): 97 (16 Jul 2025 23:00), Max: 97.5 (16 Jul 2025 15:14)  HR: 80 (17 Jul 2025 07:00) (71 - 116)  BP: 124/59 (17 Jul 2025 07:00) (93/56 - 163/91)  BP(mean): 85 (17 Jul 2025 07:00) (69 - 120)  ABP: 112/53 (17 Jul 2025 07:00) (92/49 - 174/71)  ABP(mean): 76 (17 Jul 2025 07:00)  RR: 20 (17 Jul 2025 07:00) (16 - 32)  SpO2: 97% (17 Jul 2025 07:00) (88% - 99%)      I&O's Detail    16 Jul 2025 07:01  -  17 Jul 2025 07:00  --------------------------------------------------------  IN:    IV PiggyBack: 450 mL    Norepinephrine: 18.7 mL    Oral Fluid: 360 mL    sodium chloride 0.9%: 130 mL  Total IN: 958.7 mL    OUT:    Voided (mL): 1576 mL  Total OUT: 1576 mL        Net: I&O's Detail    15 Jul 2025 07:01  -  16 Jul 2025 07:00  --------------------------------------------------------  Total NET: -1200 mL      16 Jul 2025 07:01  -  17 Jul 2025 07:00  --------------------------------------------------------  Total NET: -617.3 mL        CAPILLARY BLOOD GLUCOSE      POCT Blood Glucose.: 234 mg/dL (17 Jul 2025 07:05)  POCT Blood Glucose.: 253 mg/dL (17 Jul 2025 02:42)  POCT Blood Glucose.: 184 mg/dL (16 Jul 2025 18:42)  POCT Blood Glucose.: 155 mg/dL (16 Jul 2025 13:12)    A1C with Estimated Average Glucose Result: 6.8 % (07-01-25 @ 13:08)      Physical Exam:  General: NAD; A&Ox3  Neuro: pupils equal and reactive, speech clear, no overt sensory or motor deficits  Cardiac: S1/S2, RRR, no murmur, no rubs  Lungs: unlabored respirations, CTA b/l, no wheeze, no rales, no crackles  Abdomen: Soft/NT/ND; positive bowel sounds x 4  Incisions: Incisions clean/dry/intact  Extremities: No edema b/l lower extremities; good capillary refill; no cyanosis; palpable 1+ pedal pulses b/l        BOWEL MOVEMENT:  [X] YES [] NO, If No, Timing since last BM Day #        LABS:                        9.5[L]  2.34[L] )-----------( 67[L]    ( 17 Jul 2025 03:23 )             28.7[L]                        10.4[L]  3.44[L] )-----------( 71[L]    ( 16 Jul 2025 18:49 )             31.1[L]    07-17    141  |  107  |  16  ----------------------------<  238[H]  3.9   |  22  |  0.9  07-16    137  |  105  |  15  ----------------------------<  161[H]  3.9   |  19  |  0.8    Ca    7.9[L]      17 Jul 2025 03:23  Phos  3.3     07-17  Mg     1.8     07-17    TPro  5.4[L] [6.0 - 8.0]  /  Alb  3.2[L] [3.5 - 5.2]  /  TBili  0.3 [0.2 - 1.2]  /  DBili  x   /  AST  111[H] [0 - 41]  /  ALT  54[H] [0 - 41]  /  AlkPhos  94 [30 - 115]  07-17    PT/INR - ( 16 Jul 2025 18:49 )   PT: ;   INR: 1.31 ratio         PTT - ( 16 Jul 2025 18:49 )  PTT:31.8 sec      ABG - ( 16 Jul 2025 18:47 )  pH: 7.37  /  pCO2: 33    /  pO2: 140   / HCO3: 19    / Base Excess: -5.4  /  SaO2: 98.3  /  LA: 1.5        RADIOLOGY & ADDITIONAL TESTS:  CXR:   < from: Xray Chest 1 View- PORTABLE-Urgent (Xray Chest 1 View- PORTABLE-Urgent .) (07.16.25 @ 23:01) >  INTERPRETATION:  CLINICAL HISTORY / REASON FOR EXAM: Post-operative   evaluation.    COMPARISON: Chest radiograph from July 7, 2025.    TECHNIQUE/POSITIONING: Satisfactory. Two images, AP chest radiographs.    FINDINGS:    SUPPORT DEVICES: Femoral position pacing lead.    CARDIAC/MEDIASTINUM/HILUM: Status post TAVR.    LUNG PARENCHYMA/PLEURA: No focal consolidation or pleural effusion. No   pneumothorax.    SKELETON/SOFT TISSUES: Unchanged.      IMPRESSION:    No radiographic evidence of acute cardiopulmonary disease.        EKG:  < from: 12 Lead ECG (07.17.25 @ 08:07) >    Ventricular Rate 80 BPM    Atrial Rate 80 BPM    P-R Interval 186 ms    QRS Duration 80 ms    Q-T Interval 384 ms    QTC Calculation(Bazett) 442 ms    P Axis 75 degrees    R Axis 45 degrees    T Axis 223 degrees    Diagnosis Line Normal sinus rhythm  Nonspecific T wave abnormality  Abnormal ECG    Confirmed by Octaviano Ibarra ([id number]) on 7/17/2025 1:33:26 PM        Allergies    No Known Allergies    Intolerances      MEDICATIONS  (STANDING):  aspirin  chewable 81 milliGRAM(s) Oral daily  chlorhexidine 0.12% Liquid 15 milliLiter(s) Oral Mucosa every 12 hours  clopidogrel Tablet 75 milliGRAM(s) Oral daily  dextrose 5%. 1000 milliLiter(s) (100 mL/Hr) IV Continuous <Continuous>  dextrose 5%. 1000 milliLiter(s) (50 mL/Hr) IV Continuous <Continuous>  dextrose 50% Injectable 25 Gram(s) IV Push once  dextrose 50% Injectable 12.5 Gram(s) IV Push once  dextrose 50% Injectable 25 Gram(s) IV Push once  glucagon  Injectable 1 milliGRAM(s) IntraMuscular once  heparin   Injectable 3000 Unit(s) SubCutaneous every 12 hours  insulin lispro (ADMELOG) corrective regimen sliding scale   SubCutaneous three times a day before meals  insulin lispro (ADMELOG) corrective regimen sliding scale   SubCutaneous at bedtime  meperidine     Injectable 25 milliGRAM(s) IV Push once  metoprolol tartrate 12.5 milliGRAM(s) Oral every 6 hours  niCARdipine Infusion 5 mG/Hr (25 mL/Hr) IV Continuous <Continuous>  norepinephrine Infusion 0.05 MICROgram(s)/kG/Min (8.93 mL/Hr) IV Continuous <Continuous>  pantoprazole  Injectable 40 milliGRAM(s) IV Push two times a day  sodium chloride 0.9% lock flush 3 milliLiter(s) IV Push every 8 hours  sodium chloride 0.9%. 1000 milliLiter(s) (10 mL/Hr) IV Continuous <Continuous>  tamsulosin 0.4 milliGRAM(s) Oral at bedtime    MEDICATIONS  (PRN):  dextrose Oral Gel 15 Gram(s) Oral once PRN Blood Glucose LESS THAN 70 milliGRAM(s)/deciliter    Home Medications:  aspirin 81 mg oral capsule: 1 cap(s) orally (15 Jul 2025 09:12)  benzonatate 200 mg oral capsule: 1 cap(s) orally (15 Jul 2025 09:12)  ezetimibe 10 mg oral tablet: 1 tab(s) orally (15 Jul 2025 09:12)  Flomax 0.4 mg oral capsule: 1 cap(s) orally once a day (15 Jul 2025 09:12)  Insulin Glargine:  (15 Jul 2025 09:12)  isosorbide mononitrate 30 mg oral tablet, extended release: 1 tab(s) orally once a day (in the morning) (15 Jul 2025 09:12)  Ozempic 2 mg/1.5 mL (0.25 mg or 0.5 mg dose) subcutaneous solution: 0.5 milligram(s) subcutaneously (15 Jul 2025 09:12)  Plavix 75 mg oral tablet: 1 tab(s) orally (15 Jul 2025 09:15)  rosuvastatin 20 mg oral capsule: 1 cap(s) orally once a day (at bedtime) (15 Jul 2025 09:15)  SITagliptin 100 mg oral tablet: 1 tab(s) orally once a day (15 Jul 2025 09:12)      Pharmacologic DVT Prophylaxis [X] YES, [] NO: Contraindication:  SCD's: YES b/l    GI Prophylaxis: protonix    Post-Op Beta-Blockers: [X] Yes, [] No: contraindication:  Post-Op Aspirin:  [X] Yes, [] No: contraindication:  Post-Op Statin:  [X] Yes, [] No: contraindication:    Ambulation/Activity Status: ambulate as tolerated    Assessment/Plan:  85y Male status-post TAVR  POD #1  - Case and plan discussed with CTU Intensivist and CT Surgeon - Dr. Tang/Kylie/Lisandra/Chuck  - Continue CTU supportive care and ongoing plan of care as per continuing CTU rounds.   - Continue DVT/GI prophylaxis  - Incentive Spirometry 10 times an hour  - Continue to advance physical activity as tolerated and continue PT/OT as directed  1. AS s/p TAVR: Continue ASA, statin, BB. TVP removed. Echo completed -> TAVR valve in proper aortic position  2. HTN: Continue PO lisinopril  3. Post-op A. Fib ppx: currently NSR, on PO BB, eliquis 5mg BID started  4. COPD/Hypoxia: currently on room air, encourage cough and deep breathing   5. DM/Glucose Control: A1c 6.8, continue RISS coverage. Restart home medications on discharge    Social Service Disposition: d/c home with home care today, MCOT placed

## 2025-07-17 NOTE — PHYSICAL THERAPY INITIAL EVALUATION ADULT - GAIT DEVIATIONS NOTED, PT EVAL
Increased antoni, VC's to slow down for safety, especially when turning around./decreased step length

## 2025-07-17 NOTE — DIETITIAN INITIAL EVALUATION ADULT - PERTINENT LABORATORY DATA
07-17    141  |  107  |  16  ----------------------------<  238[H]  3.9   |  22  |  0.9    Ca    7.9[L]      17 Jul 2025 03:23  Phos  3.3     07-17  Mg     1.8     07-17    TPro  5.4[L]  /  Alb  3.2[L]  /  TBili  0.3  /  DBili  x   /  AST  111[H]  /  ALT  54[H]  /  AlkPhos  94  07-17  POCT Blood Glucose.: 198 mg/dL (07-17-25 @ 11:38)  A1C with Estimated Average Glucose Result: 6.8 % (07-01-25 @ 13:08)

## 2025-07-17 NOTE — DIETITIAN INITIAL EVALUATION ADULT - OTHER CALCULATIONS
Using ABW: ENERGY: 8186-9915 kcal/day (20-25 kcal/kg); PROTEIN: 101-134 g/day (1.5-2 g/kg IBW 67.3 KG); FLUID: 1mL/kcal -- with consideration for age, BMI, surgery

## 2025-07-18 ENCOUNTER — APPOINTMENT (OUTPATIENT)
Dept: CARE COORDINATION | Facility: HOME HEALTH | Age: 85
End: 2025-07-18

## 2025-07-20 VITALS
SYSTOLIC BLOOD PRESSURE: 122 MMHG | DIASTOLIC BLOOD PRESSURE: 70 MMHG | OXYGEN SATURATION: 95 % | RESPIRATION RATE: 12 BRPM | HEART RATE: 80 BPM

## 2025-07-20 RX ORDER — APIXABAN 5 MG/1
5 TABLET, FILM COATED ORAL
Refills: 0 | Status: ACTIVE | COMMUNITY
Start: 2025-07-20

## 2025-07-22 ENCOUNTER — RX RENEWAL (OUTPATIENT)
Age: 85
End: 2025-07-22

## 2025-07-25 ENCOUNTER — APPOINTMENT (OUTPATIENT)
Dept: CARDIOTHORACIC SURGERY | Facility: CLINIC | Age: 85
End: 2025-07-25
Payer: MEDICARE

## 2025-07-25 VITALS
SYSTOLIC BLOOD PRESSURE: 95 MMHG | HEART RATE: 78 BPM | HEIGHT: 64 IN | TEMPERATURE: 98.6 F | RESPIRATION RATE: 14 BRPM | DIASTOLIC BLOOD PRESSURE: 56 MMHG | OXYGEN SATURATION: 96 %

## 2025-07-25 DIAGNOSIS — Z79.4 LONG TERM (CURRENT) USE OF INSULIN: ICD-10-CM

## 2025-07-25 DIAGNOSIS — I11.0 HYPERTENSIVE HEART DISEASE WITH HEART FAILURE: ICD-10-CM

## 2025-07-25 DIAGNOSIS — Z86.13 PERSONAL HISTORY OF MALARIA: ICD-10-CM

## 2025-07-25 DIAGNOSIS — D53.9 NUTRITIONAL ANEMIA, UNSPECIFIED: ICD-10-CM

## 2025-07-25 DIAGNOSIS — K76.6 PORTAL HYPERTENSION: ICD-10-CM

## 2025-07-25 DIAGNOSIS — Z79.85 LONG-TERM (CURRENT) USE OF INJECTABLE NON-INSULIN ANTIDIABETIC DRUGS: ICD-10-CM

## 2025-07-25 DIAGNOSIS — E78.00 PURE HYPERCHOLESTEROLEMIA, UNSPECIFIED: ICD-10-CM

## 2025-07-25 DIAGNOSIS — Z00.6 ENCOUNTER FOR EXAMINATION FOR NORMAL COMPARISON AND CONTROL IN CLINICAL RESEARCH PROGRAM: ICD-10-CM

## 2025-07-25 DIAGNOSIS — E11.9 TYPE 2 DIABETES MELLITUS WITHOUT COMPLICATIONS: ICD-10-CM

## 2025-07-25 DIAGNOSIS — Z87.891 PERSONAL HISTORY OF NICOTINE DEPENDENCE: ICD-10-CM

## 2025-07-25 DIAGNOSIS — K29.40 CHRONIC ATROPHIC GASTRITIS WITHOUT BLEEDING: ICD-10-CM

## 2025-07-25 DIAGNOSIS — E83.42 HYPOMAGNESEMIA: ICD-10-CM

## 2025-07-25 DIAGNOSIS — I35.0 NONRHEUMATIC AORTIC (VALVE) STENOSIS: ICD-10-CM

## 2025-07-25 DIAGNOSIS — Z95.5 PRESENCE OF CORONARY ANGIOPLASTY IMPLANT AND GRAFT: ICD-10-CM

## 2025-07-25 DIAGNOSIS — D69.6 THROMBOCYTOPENIA, UNSPECIFIED: ICD-10-CM

## 2025-07-25 DIAGNOSIS — Z86.19 PERSONAL HISTORY OF OTHER INFECTIOUS AND PARASITIC DISEASES: ICD-10-CM

## 2025-07-25 DIAGNOSIS — N40.0 BENIGN PROSTATIC HYPERPLASIA WITHOUT LOWER URINARY TRACT SYMPTOMS: ICD-10-CM

## 2025-07-25 DIAGNOSIS — I25.10 ATHEROSCLEROTIC HEART DISEASE OF NATIVE CORONARY ARTERY WITHOUT ANGINA PECTORIS: ICD-10-CM

## 2025-07-25 DIAGNOSIS — I48.92 UNSPECIFIED ATRIAL FLUTTER: ICD-10-CM

## 2025-07-25 DIAGNOSIS — E88.810 METABOLIC SYNDROME: ICD-10-CM

## 2025-07-25 DIAGNOSIS — Z79.82 LONG TERM (CURRENT) USE OF ASPIRIN: ICD-10-CM

## 2025-07-25 DIAGNOSIS — K74.69 OTHER CIRRHOSIS OF LIVER: ICD-10-CM

## 2025-07-25 DIAGNOSIS — I50.32 CHRONIC DIASTOLIC (CONGESTIVE) HEART FAILURE: ICD-10-CM

## 2025-07-25 PROCEDURE — 99214 OFFICE O/P EST MOD 30 MIN: CPT

## 2025-07-26 ENCOUNTER — TRANSCRIPTION ENCOUNTER (OUTPATIENT)
Age: 85
End: 2025-07-26

## 2025-07-26 ENCOUNTER — EMERGENCY (EMERGENCY)
Facility: HOSPITAL | Age: 85
LOS: 0 days | Discharge: HOME | End: 2025-07-27
Attending: EMERGENCY MEDICINE
Payer: MEDICARE

## 2025-07-26 VITALS
WEIGHT: 210.1 LBS | RESPIRATION RATE: 18 BRPM | HEIGHT: 67 IN | DIASTOLIC BLOOD PRESSURE: 66 MMHG | HEART RATE: 85 BPM | OXYGEN SATURATION: 97 % | SYSTOLIC BLOOD PRESSURE: 108 MMHG | TEMPERATURE: 99 F

## 2025-07-26 DIAGNOSIS — Z95.5 PRESENCE OF CORONARY ANGIOPLASTY IMPLANT AND GRAFT: ICD-10-CM

## 2025-07-26 DIAGNOSIS — Z87.891 PERSONAL HISTORY OF NICOTINE DEPENDENCE: ICD-10-CM

## 2025-07-26 DIAGNOSIS — Z95.4 PRESENCE OF OTHER HEART-VALVE REPLACEMENT: ICD-10-CM

## 2025-07-26 DIAGNOSIS — M79.641 PAIN IN RIGHT HAND: ICD-10-CM

## 2025-07-26 DIAGNOSIS — Z79.02 LONG TERM (CURRENT) USE OF ANTITHROMBOTICS/ANTIPLATELETS: ICD-10-CM

## 2025-07-26 DIAGNOSIS — E11.9 TYPE 2 DIABETES MELLITUS WITHOUT COMPLICATIONS: ICD-10-CM

## 2025-07-26 DIAGNOSIS — I65.23 OCCLUSION AND STENOSIS OF BILATERAL CAROTID ARTERIES: ICD-10-CM

## 2025-07-26 DIAGNOSIS — Z90.49 ACQUIRED ABSENCE OF OTHER SPECIFIED PARTS OF DIGESTIVE TRACT: Chronic | ICD-10-CM

## 2025-07-26 DIAGNOSIS — I66.02 OCCLUSION AND STENOSIS OF LEFT MIDDLE CEREBRAL ARTERY: ICD-10-CM

## 2025-07-26 DIAGNOSIS — Z79.85 LONG-TERM (CURRENT) USE OF INJECTABLE NON-INSULIN ANTIDIABETIC DRUGS: ICD-10-CM

## 2025-07-26 DIAGNOSIS — I72.5 ANEURYSM OF OTHER PRECEREBRAL ARTERIES: ICD-10-CM

## 2025-07-26 DIAGNOSIS — Z86.79 PERSONAL HISTORY OF OTHER DISEASES OF THE CIRCULATORY SYSTEM: ICD-10-CM

## 2025-07-26 DIAGNOSIS — E78.5 HYPERLIPIDEMIA, UNSPECIFIED: ICD-10-CM

## 2025-07-26 DIAGNOSIS — Z79.01 LONG TERM (CURRENT) USE OF ANTICOAGULANTS: ICD-10-CM

## 2025-07-26 DIAGNOSIS — I10 ESSENTIAL (PRIMARY) HYPERTENSION: ICD-10-CM

## 2025-07-26 DIAGNOSIS — I48.91 UNSPECIFIED ATRIAL FIBRILLATION: ICD-10-CM

## 2025-07-26 DIAGNOSIS — I25.10 ATHEROSCLEROTIC HEART DISEASE OF NATIVE CORONARY ARTERY WITHOUT ANGINA PECTORIS: ICD-10-CM

## 2025-07-26 PROCEDURE — 70496 CT ANGIOGRAPHY HEAD: CPT

## 2025-07-26 PROCEDURE — 80061 LIPID PANEL: CPT

## 2025-07-26 PROCEDURE — 93010 ELECTROCARDIOGRAM REPORT: CPT

## 2025-07-26 PROCEDURE — 70498 CT ANGIOGRAPHY NECK: CPT

## 2025-07-26 PROCEDURE — 83036 HEMOGLOBIN GLYCOSYLATED A1C: CPT

## 2025-07-26 PROCEDURE — 70450 CT HEAD/BRAIN W/O DYE: CPT

## 2025-07-26 PROCEDURE — 93306 TTE W/DOPPLER COMPLETE: CPT

## 2025-07-26 PROCEDURE — 99285 EMERGENCY DEPT VISIT HI MDM: CPT

## 2025-07-26 PROCEDURE — 83735 ASSAY OF MAGNESIUM: CPT

## 2025-07-26 PROCEDURE — 82550 ASSAY OF CK (CPK): CPT

## 2025-07-26 PROCEDURE — 36415 COLL VENOUS BLD VENIPUNCTURE: CPT

## 2025-07-26 PROCEDURE — 84443 ASSAY THYROID STIM HORMONE: CPT

## 2025-07-26 PROCEDURE — 70551 MRI BRAIN STEM W/O DYE: CPT

## 2025-07-26 PROCEDURE — 85025 COMPLETE CBC W/AUTO DIFF WBC: CPT

## 2025-07-26 PROCEDURE — 36000 PLACE NEEDLE IN VEIN: CPT | Mod: XU

## 2025-07-26 PROCEDURE — G0378: CPT

## 2025-07-26 PROCEDURE — 99283 EMERGENCY DEPT VISIT LOW MDM: CPT | Mod: 25

## 2025-07-26 PROCEDURE — 80053 COMPREHEN METABOLIC PANEL: CPT

## 2025-07-26 PROCEDURE — 93005 ELECTROCARDIOGRAM TRACING: CPT

## 2025-07-26 PROCEDURE — 99291 CRITICAL CARE FIRST HOUR: CPT

## 2025-07-26 NOTE — ED CLERICAL - NS ED CLERK NOTE PRE-ARRIVAL INFORMATION; ADDITIONAL PRE-ARRIVAL INFORMATION
This patient is enrolled in the [facility] program and has undergone a cardiac surgery procedure within the last 90 days and has active care navigation.   This patient can be followed up by the care navigation team within 24 hours. To arrange close follow-up or to obtain additional clinical information about this patient, please call the contact number above.   Please call the cardiac surgery team once patient is registered at [phone] (DAYS) or [phone] (NIGHTS) for the intensivist for consultation PRIOR to disposition decision.  The patient recently underwent a cardiac surgery procedure and the team can assist in acute medical management.

## 2025-07-27 VITALS — SYSTOLIC BLOOD PRESSURE: 109 MMHG | HEART RATE: 80 BPM | DIASTOLIC BLOOD PRESSURE: 55 MMHG

## 2025-07-27 LAB
ALBUMIN SERPL ELPH-MCNC: 3.6 G/DL — SIGNIFICANT CHANGE UP (ref 3.5–5.2)
ALP SERPL-CCNC: 114 U/L — SIGNIFICANT CHANGE UP (ref 30–115)
ALT FLD-CCNC: 52 U/L — HIGH (ref 0–41)
ANION GAP SERPL CALC-SCNC: 13 MMOL/L — SIGNIFICANT CHANGE UP (ref 7–14)
AST SERPL-CCNC: 72 U/L — HIGH (ref 0–41)
BASOPHILS # BLD AUTO: 0.03 K/UL — SIGNIFICANT CHANGE UP (ref 0–0.2)
BASOPHILS NFR BLD AUTO: 1.3 % — HIGH (ref 0–1)
BILIRUB SERPL-MCNC: 0.3 MG/DL — SIGNIFICANT CHANGE UP (ref 0.2–1.2)
BUN SERPL-MCNC: 19 MG/DL — SIGNIFICANT CHANGE UP (ref 10–20)
CALCIUM SERPL-MCNC: 8.4 MG/DL — SIGNIFICANT CHANGE UP (ref 8.4–10.5)
CHLORIDE SERPL-SCNC: 100 MMOL/L — SIGNIFICANT CHANGE UP (ref 98–110)
CHOLEST SERPL-MCNC: 70 MG/DL — SIGNIFICANT CHANGE UP
CK SERPL-CCNC: 51 U/L — SIGNIFICANT CHANGE UP (ref 0–225)
CO2 SERPL-SCNC: 20 MMOL/L — SIGNIFICANT CHANGE UP (ref 17–32)
CREAT SERPL-MCNC: 1 MG/DL — SIGNIFICANT CHANGE UP (ref 0.7–1.5)
EGFR: 74 ML/MIN/1.73M2 — SIGNIFICANT CHANGE UP
EGFR: 74 ML/MIN/1.73M2 — SIGNIFICANT CHANGE UP
EOSINOPHIL # BLD AUTO: 0.09 K/UL — SIGNIFICANT CHANGE UP (ref 0–0.7)
EOSINOPHIL NFR BLD AUTO: 3.9 % — SIGNIFICANT CHANGE UP (ref 0–8)
GIANT PLATELETS BLD QL SMEAR: PRESENT — SIGNIFICANT CHANGE UP
GLUCOSE SERPL-MCNC: 271 MG/DL — HIGH (ref 70–99)
HCT VFR BLD CALC: 28.6 % — LOW (ref 42–52)
HDLC SERPL-MCNC: 32 MG/DL — LOW
HGB BLD-MCNC: 9.6 G/DL — LOW (ref 14–18)
HYPOCHROMIA BLD QL: SIGNIFICANT CHANGE UP
IMM GRANULOCYTES NFR BLD AUTO: 0.4 % — HIGH (ref 0.1–0.3)
LDLC SERPL-MCNC: 12 MG/DL — SIGNIFICANT CHANGE UP
LIPID PNL WITH DIRECT LDL SERPL: 12 MG/DL — SIGNIFICANT CHANGE UP
LYMPHOCYTES # BLD AUTO: 0.77 K/UL — LOW (ref 1.2–3.4)
LYMPHOCYTES # BLD AUTO: 33.5 % — SIGNIFICANT CHANGE UP (ref 20.5–51.1)
MAGNESIUM SERPL-MCNC: 1.8 MG/DL — SIGNIFICANT CHANGE UP (ref 1.8–2.4)
MANUAL SMEAR VERIFICATION: SIGNIFICANT CHANGE UP
MCHC RBC-ENTMCNC: 31.2 PG — HIGH (ref 27–31)
MCHC RBC-ENTMCNC: 33.6 G/DL — SIGNIFICANT CHANGE UP (ref 32–37)
MCV RBC AUTO: 92.9 FL — SIGNIFICANT CHANGE UP (ref 80–94)
MONOCYTES # BLD AUTO: 0.41 K/UL — SIGNIFICANT CHANGE UP (ref 0.1–0.6)
MONOCYTES NFR BLD AUTO: 17.8 % — HIGH (ref 1.7–9.3)
NEUTROPHILS # BLD AUTO: 0.99 K/UL — LOW (ref 1.4–6.5)
NEUTROPHILS NFR BLD AUTO: 43.1 % — SIGNIFICANT CHANGE UP (ref 42.2–75.2)
NONHDLC SERPL-MCNC: 38 MG/DL — SIGNIFICANT CHANGE UP
NRBC # BLD: 2 /100 WBCS — HIGH (ref 0–0)
NRBC BLD AUTO-RTO: 0 /100 WBCS — SIGNIFICANT CHANGE UP (ref 0–0)
NRBC BLD-RTO: 2 /100 WBCS — HIGH (ref 0–0)
PLAT MORPH BLD: NORMAL — SIGNIFICANT CHANGE UP
PLATELET # BLD AUTO: 73 K/UL — LOW (ref 130–400)
PMV BLD: 10.2 FL — SIGNIFICANT CHANGE UP (ref 7.4–10.4)
POIKILOCYTOSIS BLD QL AUTO: SLIGHT — SIGNIFICANT CHANGE UP
POLYCHROMASIA BLD QL SMEAR: SLIGHT — SIGNIFICANT CHANGE UP
POTASSIUM SERPL-MCNC: 4.1 MMOL/L — SIGNIFICANT CHANGE UP (ref 3.5–5)
POTASSIUM SERPL-SCNC: 4.1 MMOL/L — SIGNIFICANT CHANGE UP (ref 3.5–5)
PROT SERPL-MCNC: 6.4 G/DL — SIGNIFICANT CHANGE UP (ref 6–8)
RBC # BLD: 3.08 M/UL — LOW (ref 4.7–6.1)
RBC # FLD: 13.2 % — SIGNIFICANT CHANGE UP (ref 11.5–14.5)
RBC BLD AUTO: ABNORMAL
SMUDGE CELLS # BLD: PRESENT — SIGNIFICANT CHANGE UP
SODIUM SERPL-SCNC: 133 MMOL/L — LOW (ref 135–146)
TRIGL SERPL-MCNC: 159 MG/DL — HIGH
WBC # BLD: 2.3 K/UL — LOW (ref 4.8–10.8)
WBC # FLD AUTO: 2.3 K/UL — LOW (ref 4.8–10.8)

## 2025-07-27 PROCEDURE — 70498 CT ANGIOGRAPHY NECK: CPT | Mod: 26

## 2025-07-27 PROCEDURE — 93306 TTE W/DOPPLER COMPLETE: CPT | Mod: 26

## 2025-07-27 PROCEDURE — 70450 CT HEAD/BRAIN W/O DYE: CPT | Mod: 26,XU

## 2025-07-27 PROCEDURE — 70496 CT ANGIOGRAPHY HEAD: CPT | Mod: 26

## 2025-07-27 PROCEDURE — 70551 MRI BRAIN STEM W/O DYE: CPT | Mod: 26

## 2025-07-27 PROCEDURE — 99236 HOSP IP/OBS SAME DATE HI 85: CPT

## 2025-07-27 PROCEDURE — 99282 EMERGENCY DEPT VISIT SF MDM: CPT

## 2025-07-27 RX ADMIN — Medication 100 MILLILITER(S): at 04:30

## 2025-07-27 NOTE — ED PROVIDER NOTE - CLINICAL SUMMARY MEDICAL DECISION MAKING FREE TEXT BOX
Patient remained stable in ED, labs/EKG/CT findings reviewed and discussed with patient. Discussed with EDOU/OBS provider, patient is admitted to EDOU for further evaluation of her symptoms.

## 2025-07-27 NOTE — CONSULT NOTE ADULT - SUBJECTIVE AND OBJECTIVE BOX
84 y/o male with past medical history of hypertension, hyperlipidemia, CAD with 1 stent, and history of aortic stenosis with percutaneous TAVR presents to the ED for evaluation of right hand pain that began at 9 PM and an episode of dizziness lasting 30 minutes. Patient notes he took plavix and eliquis pos ttav procedure. Patient denies any chest pain, denies any abdominal pain.   Patient reports his wife massaged his hand and gave him magnesium and the pain resolved.     ROS: Patient denies chest pain, shortness of breath, weakness, numbness, abdominal pain, nausea, vomiting diarrhea, constipation, leg pain,  (+) right arm pain, (+) dizziness lasting 30 minutes, (+) visual disturbances    Upon evaluation in ER, patient's vision, and right arm pain resolved,    OBJECTIVE:  ICU Vital Signs Last 24 Hrs  T(C): 37.1 (26 Jul 2025 22:20), Max: 37.1 (26 Jul 2025 22:20)  T(F): 98.8 (26 Jul 2025 22:20), Max: 98.8 (26 Jul 2025 22:20)  HR: 85 (26 Jul 2025 22:20) (85 - 85)  BP: 108/66 (26 Jul 2025 22:20) (108/66 - 108/66)    SpO2: 97% (26 Jul 2025 22:20) (97% - 97%)  LABS:                          9.6    2.30  )-----------( 73       ( 26 Jul 2025 23:51 )             28.6     07-26    133[L]  |  100  |  19  ----------------------------<  271[H]  4.1   |  20  |  1.0    Ca    8.4      26 Jul 2025 23:51  Mg     1.8     07-26    TPro  6.4  /  Alb  3.6  /  TBili  0.3  /  DBili  x   /  AST  72[H]  /  ALT  52[H]  /  AlkPhos  114  07-26      Urinalysis Basic - ( 26 Jul 2025 23:51 )    Color: x / Appearance: x / SG: x / pH: x  Gluc: 271 mg/dL / Ketone: x  / Bili: x / Urobili: x   Blood: x / Protein: x / Nitrite: x   Leuk Esterase: x / RBC: x / WBC x   Sq Epi: x / Non Sq Epi: x / Bacteria: x        Home Medications:  aspirin 81 mg oral capsule: 1 cap(s) orally (15 Jul 2025 09:12)  benzonatate 200 mg oral capsule: 1 cap(s) orally (15 Jul 2025 09:12)  ezetimibe 10 mg oral tablet: 1 tab(s) orally (15 Jul 2025 09:12)  Flomax 0.4 mg oral capsule: 1 cap(s) orally once a day (15 Jul 2025 09:12)  Insulin Glargine:  (15 Jul 2025 09:12)  isosorbide mononitrate 30 mg oral tablet, extended release: 1 tab(s) orally once a day (in the morning) (15 Jul 2025 09:12)  Ozempic 2 mg/1.5 mL (0.25 mg or 0.5 mg dose) subcutaneous solution: 0.5 milligram(s) subcutaneously (15 Jul 2025 09:12)  rosuvastatin 20 mg oral capsule: 1 cap(s) orally once a day (at bedtime) (15 Jul 2025 09:15)  SITagliptin 100 mg oral tablet: 1 tab(s) orally once a day (15 Jul 2025 09:12)

## 2025-07-27 NOTE — CONSULT NOTE ADULT - ASSESSMENT
(+) right arm pain, (+) dizziness which resolved upon evaluation in ER  -r/o TIA/CVA; obtain CT head  right arm buring pain which resolved; Obtain vascular ultrasund of neck (r/o carotid dissection) and echo r/o valvular issues     (+) right arm pain, (+) dizziness which resolved upon evaluation in ER  -r/o TIA/CVA; obtain CT head  -right arm burning pain which resolved; Obtain vascular ultrasound of neck (r/o carotid dissection) and echo r/o valvular issues  -neurology eval  -echo reveals normal valve function, no CT surgery intervention at current time. Patient will follow up with structural heart cardiology attending Dr. Palacios  -continue antiplatelet and anticoagulant, continue statins  Please call CT surgery if patient's clinical status worsens.

## 2025-07-27 NOTE — ED ADULT NURSE NOTE - CAS EDP DISCH TYPE
Home Airway patent, Nasal mucosa clear. Mouth with normal mucosa. Throat has no vesicles, no oropharyngeal exudates and uvula is midline.

## 2025-07-27 NOTE — CONSULT NOTE ADULT - ASSESSMENT
84 y/o RHM with h/o of hypertension, hyperlipidemia, CAD with 1 stent, and history of aortic stenosis with percutaneous TAVR 7/16/25 , Afib post operatively was started on Eliquis, presents to the ED for evaluation of right hand pain/cramping that began at 9 PM last evening and an episode of dizziness. Patient describes dizziness as feeling off balance and is positional. Patient reports compliance with Eliquis and Plavix as recommended at d/c on 7/17 after TAVR. Patient denies any chest pain, sob, focal weakness, numbness, speech visual deficit,  problems swallowing or leaning to a particular side, denies any ear symptoms.  Patient reports that his wife massaged his hand and gave him magnesium and the pain resolved. Upon evaluation in ER, and right arm pain resolved. /66 . Nonfocal neuro exam      ASSESSMENT  #Positional dizziness  -R/O Posterior circulation stroke  -Orthostatic hypotension  -BPPV or other vestibulopathy  #Aortic stenosis s/p TAVR 7/16 and afib post operatively on Plavix 75mg q daily + Eliquis 5 mg q bid (reports compliance)  #H/O DM2: On ozempic and OHA at home  #H/O CAD S/P Stent   #H/O HTN on home medications, denies any recent fluctuations in BP  #H/O HLD on statin at home      Plan  ·	Tele monitoring   ·	CTH N/C negative for acute findings  ·	Obtain CTA head/neck   ·	MRI BRAIN N/C  ·	Obtain ECHO  ·	Check orthostatic vital signs  ·	Check  lipid panel , HBAIC, TSH  ·	Continue Home dose of Eliquis 5mg q bid  ·	Continue home dose of Plavix 75mg q 24  ·	Continue statin once LFTS are wnl  ·	Fall safety precautions  ·	Follow CT surgery recommendations  ·	Please call for any acute change in neuro status  ·	Neuro attending note and attestation will follow       86 y/o RHM with h/o of hypertension, hyperlipidemia, CAD with 1 stent, and history of aortic stenosis with percutaneous TAVR 7/16/25 , Afib post operatively was started on Eliquis, presents to the ED for evaluation of right hand pain/cramping that began at 9 PM last evening and an episode of dizziness. Patient describes dizziness as feeling off balance and is positional. Patient reports compliance with Eliquis and Plavix as recommended at d/c on 7/17 after TAVR. Patient denies any chest pain, sob, focal weakness, numbness, speech visual deficit,  problems swallowing or leaning to a particular side, denies any ear symptoms.  Patient reports that his wife massaged his hand and gave him magnesium and the pain resolved. Upon evaluation in ER, and right arm pain resolved. /66 . 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 Stable mild multifocal stenosis of left M1 segment noted on CTA H will need neuro IR follow up as outpatient.  #Aortic stenosis s/p TAVR 7/16 and afib post operatively on Plavix 75mg q daily + Eliquis 5 mg q bid (reports compliance)  #H/O DM2: On ozempic and OHA at home  #H/O CAD S/P Stent   #H/O HTN on home medications, denies any recent fluctuations in BP  #H/O HLD on statin at home      Plan  ·	Tele monitoring   ·	CTH N/C negative for acute findings  ·	MRI BRAIN N/C  ·	Obtain ECHO  ·	Check orthostatic vital signs  ·	Check  lipid panel , HBAIC, TSH  ·	Continue Home dose of Eliquis 5mg q bid  ·	Continue home dose of Plavix 75mg q 24  ·	Continue statin once LFTS are wnl  ·	Fall safety precautions  ·	Follow CT surgery recommendations  ·	Please call for any acute change in neuro status  ·	Patient will need outpatient Neuro endovascular follow up for above CTA findings  ·	Neuro attending note and attestation will follow  ·	Case was discussed with neurology attending on call Dr Juan Pablo Espinal MD

## 2025-07-27 NOTE — ED PROVIDER NOTE - ATTENDING APP SHARED VISIT CONTRIBUTION OF CARE
I have personally performed a history and physical exam on this patient. I have personally directed the management of the patient.  Patient presents to ED for evaluation of dizziness and transient blurry vision, intermittent episodes and Rt hand pain, which had improved. No trauma.   Vitals reviewed.   Patient is awake, alert, answering questions appropriately, appears comfortable and not in any distress.  Lungs: CTA, no wheezing, no crackles.  RUE exam: No skin color changes, no rash, no tenderness, RUE has full ROM and is distally NVI.  CNS: awake, alert, o x 3, no focal neurologic deficits.

## 2025-07-27 NOTE — ED PROVIDER NOTE - CONSULTANT FREE TEXT FOR MDM DISCUSSED CASE WITH QUESTION
CT surgery ICU Attending: Nilsa Lopes) CT surgery ICU Attending: Nilsa Lopes)  Neurology NP:  Cachorro Mcintosh (NP)

## 2025-07-27 NOTE — ED CDU PROVIDER INITIAL DAY NOTE - NS ED MD PROGRESS NOTE PROVIDER NAME2 FT
"Writer discussed results with Dr. Emery. He is not worried about the test preventing him from having surgery today.    Writer called and spoke with pt about results.  He states it is not blood, but a \"different color tinge\"    Dr. Emery again reiterated he is not concerned about this.    Milady HORN RN   [facility], Urology   5/20/2025 9:22 AM      " compitello

## 2025-07-27 NOTE — ED CDU PROVIDER INITIAL DAY NOTE - PROGRESS NOTE DETAILS
Laker: 85-year-old male with history of HTN, HLD, CAD status post one stent, status post TAVR on July 15 here, on Eliquis, presents with brief episode of right hand pain and dizziness, seen by CT surgery Dr. MARCEL Lopes and neurology, recommending CTA head and neck, echo, neurology recommending MRI. I discussed with Dr. Lopes, given CTA results can cancel the vascular carotid duplexes, requests echo, states signing off at this time from a CT surgery standpoint and patient follows up with Dr. Saúl Palacios as an outpt. On my assessment, patient states he had significant pain to the right hand, now resolved, states had some mild transient dizziness when standing up, now resolved. Denies all other symptoms, denies shortness of breath. On exam, afebrile, hemodynamically stable, saturating well on room air, NAD, well appearing, sitting comfortably in bed, no WOB, speaking full sentences, head NCAT, EOMI grossly, anicteric, MMM, no JVD, RRR, nml S1/S2, no m/r/g, lungs CTAB, no w/r/r, abd soft, NT, ND, nml BS, no rebound or guarding, AAO, CN's 3-12 grossly intact, ALANIS spontaneously, no extremity cyanosis or edema, 2+ radial pulse, skin warm, well perfused, no rashes or hives. I reviewed labs, imaging, CT surgery and neurology notes from ED/obs course to this point. Patient with no other metal in his body and confirmed with radiology can obtain MRI of brain. Patient asymptomatic at this time. Pending echo and MRI. stable findings on cta, mri neg, cleared by CT surg and neuro, will dc

## 2025-07-27 NOTE — ED PROVIDER NOTE - PROGRESS NOTE DETAILS
FF: ct surg consult and recommend obs for ct surgery attending to see in the morning and b/l carotid duplex.

## 2025-07-27 NOTE — ED PROVIDER NOTE - OBJECTIVE STATEMENT
85-year-old male with past medical history of hypertension, hyperlipidemia, CAD with 1 stent, and history of aortic stenosis with percutaneous TAVR presents to the ED for evaluation of right hand pain that began at 9 PM.  Patient reports his wife massaged his hand and gave him magnesium and the pain resolved.  Patient reports he spoke with CT surgery on-call who advised him to visit the ED.  Patient denies chest pain, shortness of breath, dizziness, weakness, numbness, tingling, abdominal pain, nausea, vomiting diarrhea, constipation, leg pain, or leg swelling.

## 2025-07-27 NOTE — ED PROVIDER NOTE - PHYSICAL EXAMINATION
Physical Exam    Vital Signs: I have reviewed the initial vital signs.  Constitutional: well-nourished, appears stated age, no acute distress  Eyes: Conjunctiva pink, Sclera clear  Cardiovascular: regular rate, regular rhythm, well-perfused extremities, radial pulses equal and 2+ b/l.   Respiratory: unlabored respiratory effort, clear to auscultation bilaterally no wheezing, rales and rhonchi. pt is speaking full sentences. no accessory muscle use.   Gastrointestinal: soft, non-tender, nondistended abdomen, no pulsatile mass, no rebound, no guarding  Musculoskeletal: supple neck, no lower extremity edema, no calf tenderness, no pain, tenderness, erythema or edema of the right hand, FROM of b/l upper and lower extremities without pain.   Integumentary: warm, dry, no rash  Neurologic: awake, alert, median, radial, and ulnar nerve motor and sensory functions grossly intact b/l.   Psychiatric: appropriate mood, appropriate affect

## 2025-07-27 NOTE — ED CDU PROVIDER DISPOSITION NOTE - PATIENT PORTAL LINK FT
You can access the FollowMyHealth Patient Portal offered by [facility] by registering at the following website: [email or web address]. By joining [employer]’s FollowMyHealth portal, you will also be able to view your health information using other applications (apps) compatible with our system.

## 2025-07-27 NOTE — CONSULT NOTE ADULT - SUBJECTIVE AND OBJECTIVE BOX
CC: Dizziness        HPI:  Patient is a 84 y/o right handed male with past medical history of hypertension, hyperlipidemia, CAD with 1 stent, and history of aortic stenosis with percutaneous TAVR 7/16/25 , Afib post operatively was started on Eliquis, presents to the ED for evaluation of right hand pain/cramping that began at 9 PM last evening and an episode of dizziness. Patient describes dizziness as feeling off balance and is positional. Patient reports compliance with Eliquis and Plavix as recommended at d/c on 7/17 after TAVR. Patient denies any chest pain, sob, focal weakness, numbness, speech visual deficit,  problems swallowing or leaning to a particular side, denies any ear symptoms.  Patient reports that his wife massaged his hand and gave him magnesium and the pain resolved. Upon evaluation in ER, and right arm pain resolved. /66 .        Social History  +30 pack year smoking history (2ppd for 15 years)  Denies ETOH abuse  Denies illicit drug use        Home Medications:  ·	Benzonatate 200 mg oral capsule: 1 cap(s) orally (15 Jul 2025 09:12)  ·	Ezetimibe 10 mg oral tablet: 1 tab(s) orally (15 Jul 2025 09:12)  ·	Flomax 0.4 mg oral capsule: 1 cap(s) orally once a day (15 Jul 2025 09:12)  ·	Insulin Glargine:  (15 Jul 2025 09:12)  ·	Isosorbide mononitrate 30 mg oral tablet, extended release: 1 tab(s) orally once a day (in the morning) (15 Jul 2025 09:12)  ·	Ozempic 2 mg/1.5 mL (0.25 mg or 0.5 mg dose) subcutaneous solution: 0.5 milligram(s) subcutaneously (15 Jul 2025 09:12)  ·	Rosuvastatin 20 mg oral capsule: 1 cap(s) orally once a day (at bedtime) (15 Jul 2025 09:15)  ·	Sitagliptin 100 mg oral tablet: 1 tab(s) orally once a day (15 Jul 2025 09:12)  ·	Plavix 75 mg q daily   ·	Eliquis 5 mg q bid        Neuro Exam:  MENTAL STATUS   Sensorium: Awake, alert, and oriented to person, time, place and situation.     Memory: Normal recall of recent and remote events.     Language: Expressive and receptive function intact, including repetition and naming. No dysarthria/paraphasia.   Attention and concentration: The patient able to follow multi-step commands. No neglect/inattention.   Mood/Affect/Behavior: Stable, appropriate, and cooperative. No delusions or hallucinations reported.     CRANIAL NERVES     CN 1: Not assessed.   CN 2: Acuity grossly intact.Pupils 2mm reactive. VFF   CN 3/4/6: Ocular pursuit full in all directions. Conjugate gaze. No obvious nystagmus. No ptosis.   CN 5: Facial sensation intact.   CN 7: Face is symmetric   CN 8: Noted  hearing difficulties during the interview and exam but patient denies  CN 9: Gag reflex not assessed.   CN 10: No apparent dysarthria or dysphonia.   CN 11: Shoulder shrug intact  CN 12: Tongue protrusion Midline    MOTOR EXAMINATION   Muscle Bulk: Normal bulk in both upper and lower extremities     Power testing: Moving all 4 extremities against gravity, no  drift   Involuntary Movements: None     REFLEXES     Not tested on this exam    SENSATION     Sensation was intact to  touch symmetrically    COORDINATION & GAIT     No dysmetria or limb ataxia     Gait steady natural gait, unable to tandem, reports slightly off balance while walking  Romberg's : swaying but not severely symptomatic           NIHSS:   Mental status (0-2): 0   Questions (0-2):0   Commands (0-2):0   Best Gaze (0-2):0   VF (0-3): 0   Facial weakness (0-3):0   RUE (0-4): 0   LUE (0-4): 0   RLE (0-4): 0   LLE (0-4): 0   Sensation (0-2):0   Ataxia (0-2): 0   Aphasia (0-3): 0   Dysarthria (0-2):0   Extinction (0-2): 0          Modified [place] score 0 at baseline   0 No symptoms at all   1 No significant disability despite symptoms; able to carry out all usual duties and activities without assistance.   2 Slight disability; unable to carry out all previous activities, but able to look after own affairs.   3 Moderate disability; requiring some help, but able to walk without assistance.   4 Moderately severe disability; unable to walk without assistance and unable to attend to own bodily needs without assistance.   5. Severe disability      Allergies  No Known Allergies        LABS:                        9.6    2.30  )-----------( 73       ( 26 Jul 2025 23:51 )             28.6     07-26    133[L]  |  100  |  19  ----------------------------<  271[H]  4.1   |  20  |  1.0    Ca    8.4      26 Jul 2025 23:51  Mg     1.8     07-26    TPro  6.4  /  Alb  3.6  /  TBili  0.3  /  DBili  x   /  AST  72[H]  /  ALT  52[H]  /  AlkPhos  114  07-26      Neuro Imaging:  < from: CT Head No Cont (07.27.25 @ 02:34) >  Findings:    Ventricular system, basal cisterns and sulcal patterns are symmetric and  appropriate for patient's age.    No acute extra-axial collection.  No mass effect, midline shift or acute   intracranial hemorrhage.    Patchy hypodensities in the periventricular and subcortical white matter   without mass effect compatible with mild chronic microvascular changes.    No depressed skull fracture.    Paranasal sinuses mucous retention cysts. The mastoid air cells are   well-aerated.      Impression:    No evidence of acute intracranial abnormality.    --- End of Report ---    SCARLETT COREA MD; Attending Radiologist  This document has been electronically signed. Jul 27 2025  2:49AM    < end of copied text >                 CC: Dizziness        HPI:  Patient is a 86 y/o right handed male with past medical history of hypertension, hyperlipidemia, CAD with 1 stent, and history of aortic stenosis with percutaneous TAVR 7/16/25 , Afib post operatively was started on Eliquis, presents to the ED for evaluation of right hand pain/cramping that began at 9 PM last evening and an episode of dizziness. Patient describes dizziness as feeling off balance and is positional. Patient reports compliance with Eliquis and Plavix as recommended at d/c on 7/17 after TAVR. Patient denies any chest pain, sob, focal weakness, numbness, speech visual deficit,  problems swallowing or leaning to a particular side, denies any ear symptoms.  Patient reports that his wife massaged his hand and gave him magnesium and the pain resolved. Upon evaluation in ER, and right arm pain resolved. /66 .        Social History  +30 pack year smoking history (2ppd for 15 years)  Denies ETOH abuse  Denies illicit drug use        Home Medications:  ·	Benzonatate 200 mg oral capsule: 1 cap(s) orally (15 Jul 2025 09:12)  ·	Ezetimibe 10 mg oral tablet: 1 tab(s) orally (15 Jul 2025 09:12)  ·	Flomax 0.4 mg oral capsule: 1 cap(s) orally once a day (15 Jul 2025 09:12)  ·	Insulin Glargine:  (15 Jul 2025 09:12)  ·	Isosorbide mononitrate 30 mg oral tablet, extended release: 1 tab(s) orally once a day (in the morning) (15 Jul 2025 09:12)  ·	Ozempic 2 mg/1.5 mL (0.25 mg or 0.5 mg dose) subcutaneous solution: 0.5 milligram(s) subcutaneously (15 Jul 2025 09:12)  ·	Rosuvastatin 20 mg oral capsule: 1 cap(s) orally once a day (at bedtime) (15 Jul 2025 09:15)  ·	Sitagliptin 100 mg oral tablet: 1 tab(s) orally once a day (15 Jul 2025 09:12)  ·	Plavix 75 mg q daily   ·	Eliquis 5 mg q bid        Neuro Exam:  MENTAL STATUS   Sensorium: Awake, alert, and oriented to person, time, place and situation.     Memory: Normal recall of recent and remote events.     Language: Expressive and receptive function intact, including repetition and naming. No dysarthria/paraphasia.   Attention and concentration: The patient able to follow multi-step commands. No neglect/inattention.   Mood/Affect/Behavior: Stable, appropriate, and cooperative. No delusions or hallucinations reported.     CRANIAL NERVES     CN 1: Not assessed.   CN 2: Acuity grossly intact.Pupils 2mm reactive. VFF   CN 3/4/6: Ocular pursuit full in all directions. Conjugate gaze. No obvious nystagmus. No ptosis.   CN 5: Facial sensation intact.   CN 7: Face is symmetric   CN 8: Noted  hearing difficulties during the interview and exam but patient denies  CN 9: Gag reflex not assessed.   CN 10: No apparent dysarthria or dysphonia.   CN 11: Shoulder shrug intact  CN 12: Tongue protrusion Midline    MOTOR EXAMINATION   Muscle Bulk: Normal bulk in both upper and lower extremities     Power testing: Moving all 4 extremities against gravity, no  drift   Involuntary Movements: None     REFLEXES     Not tested on this exam    SENSATION     Sensation was intact to  touch symmetrically    COORDINATION & GAIT     No dysmetria or limb ataxia     Gait steady natural gait, unable to tandem, reports slightly off balance while walking  Romberg's : swaying but not severely symptomatic           NIHSS:   Mental status (0-2): 0   Questions (0-2):0   Commands (0-2):0   Best Gaze (0-2):0   VF (0-3): 0   Facial weakness (0-3):0   RUE (0-4): 0   LUE (0-4): 0   RLE (0-4): 0   LLE (0-4): 0   Sensation (0-2):0   Ataxia (0-2): 0   Aphasia (0-3): 0   Dysarthria (0-2):0   Extinction (0-2): 0          Modified [place] score 0 at baseline   0 No symptoms at all   1 No significant disability despite symptoms; able to carry out all usual duties and activities without assistance.   2 Slight disability; unable to carry out all previous activities, but able to look after own affairs.   3 Moderate disability; requiring some help, but able to walk without assistance.   4 Moderately severe disability; unable to walk without assistance and unable to attend to own bodily needs without assistance.   5. Severe disability      Allergies  No Known Allergies        LABS:                        9.6    2.30  )-----------( 73       ( 26 Jul 2025 23:51 )             28.6     07-26    133[L]  |  100  |  19  ----------------------------<  271[H]  4.1   |  20  |  1.0    Ca    8.4      26 Jul 2025 23:51  Mg     1.8     07-26    TPro  6.4  /  Alb  3.6  /  TBili  0.3  /  DBili  x   /  AST  72[H]  /  ALT  52[H]  /  AlkPhos  114  07-26      Neuro Imaging:  < from: CT Head No Cont (07.27.25 @ 02:34) >  Findings:    Ventricular system, basal cisterns and sulcal patterns are symmetric and  appropriate for patient's age.    No acute extra-axial collection.  No mass effect, midline shift or acute   intracranial hemorrhage.    Patchy hypodensities in the periventricular and subcortical white matter   without mass effect compatible with mild chronic microvascular changes.    No depressed skull fracture.    Paranasal sinuses mucous retention cysts. The mastoid air cells are   well-aerated.    Impression:    No evidence of acute intracranial abnormality.    --- End of Report ---    SCARLETT COREA MD; Attending Radiologist  This document has been electronically signed. Jul 27 2025  2:49AM    < end of copied text >        < from: CT Angio Neck w/ IV Cont (07.27.25 @ 04:54) >  IMPRESSION:    CTA NECK:  No evidence of significant stenosis or occlusion.    CTA HEAD:  No large vessel occlusion or filling defect.    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    Stable mild multifocal stenosis of left M1 segment.    Stable mild stenosis of bilateral internal carotid artery.    --- End of Report ---    AARTI GUADALUPE MD; Resident Radiologist  This document has been electronically signed.  JEAN HOLLINGSWORTH MD; Attending Radiologist  This document has been electronically signed. Jul 27 2025  6:04AM    < end of copied text >

## 2025-07-27 NOTE — ED PROVIDER NOTE - DIFFERENTIAL DIAGNOSIS
Electrolyte abnormalities, dehydration, GUERDA, hyperglycemia, hypoglycemia, symptomatic anemia.  r/o cerebrovascular events. Differential Diagnosis

## 2025-07-27 NOTE — ED CDU PROVIDER DISPOSITION NOTE - CLINICAL COURSE
85-year-old male with history of HTN, HLD, CAD status post one stent, status post TAVR on July 15 here, on Eliquis, presents with brief episode of right hand pain and dizziness, seen by CT surgery Dr. MARCEL Lopes and neurology, recommending CTA head and neck, echo, neurology recommending MRI. I discussed with Dr. Lopes, given CTA results can cancel the vascular carotid duplexes, requests echo, states signing off at this time from a CT surgery standpoint and patient follows up with Dr. Saúl Palacios as an outpt. On my assessment, patient states he had significant pain to the right hand, now resolved, states had some mild transient dizziness when standing up, now resolved. Denies all other symptoms, denies shortness of breath. On exam, afebrile, hemodynamically stable, saturating well on room air, NAD, well appearing, sitting comfortably in bed, no WOB, speaking full sentences, head NCAT, EOMI grossly, anicteric, MMM, no JVD, RRR, nml S1/S2, no m/r/g, lungs CTAB, no w/r/r, abd soft, NT, ND, nml BS, no rebound or guarding, AAO, CN's 3-12 grossly intact, ALANIS spontaneously, no extremity cyanosis or edema, 2+ radial pulse, skin warm, well perfused, no rashes or hives. I reviewed labs, imaging, CT surgery and neurology notes from ED/obs course to this point. Patient with no other metal in his body and confirmed with radiology can obtain MRI of brain. Patient asymptomatic at this time. Echo unremarkable. MRI negative. Cleared by neurology and CT surgery for outpatient follow-up. Patient is well appearing, NAD, afebrile, hemodynamically stable. Any available tests and studies were discussed with patient. Discharged with instructions in further symptomatic care, return precautions, and need for f/u.

## 2025-07-27 NOTE — ED CDU PROVIDER INITIAL DAY NOTE - ATTENDING APP SHARED VISIT CONTRIBUTION OF CARE
I have personally performed a history and physical exam on this patient. I have personally directed the management of the patient.

## 2025-07-28 LAB
A1C WITH ESTIMATED AVERAGE GLUCOSE RESULT: 6.7 % — HIGH (ref 4–5.6)
ESTIMATED AVERAGE GLUCOSE: 146 MG/DL — HIGH (ref 68–114)
TSH SERPL-MCNC: 4.18 UIU/ML — SIGNIFICANT CHANGE UP (ref 0.27–4.2)

## 2025-07-29 ENCOUNTER — APPOINTMENT (OUTPATIENT)
Dept: CARDIOLOGY | Facility: CLINIC | Age: 85
End: 2025-07-29
Payer: MEDICARE

## 2025-07-29 VITALS
SYSTOLIC BLOOD PRESSURE: 110 MMHG | WEIGHT: 210 LBS | HEIGHT: 64 IN | BODY MASS INDEX: 35.85 KG/M2 | DIASTOLIC BLOOD PRESSURE: 60 MMHG | HEART RATE: 88 BPM

## 2025-07-29 DIAGNOSIS — I35.0 NONRHEUMATIC AORTIC (VALVE) STENOSIS: ICD-10-CM

## 2025-07-29 PROCEDURE — 93000 ELECTROCARDIOGRAM COMPLETE: CPT

## 2025-07-29 PROCEDURE — 99214 OFFICE O/P EST MOD 30 MIN: CPT | Mod: 25

## 2025-07-31 ENCOUNTER — APPOINTMENT (OUTPATIENT)
Dept: CARDIOTHORACIC SURGERY | Facility: CLINIC | Age: 85
End: 2025-07-31
Payer: MEDICARE

## 2025-07-31 VITALS
HEIGHT: 64 IN | BODY MASS INDEX: 35.85 KG/M2 | DIASTOLIC BLOOD PRESSURE: 58 MMHG | TEMPERATURE: 98.3 F | OXYGEN SATURATION: 96 % | WEIGHT: 210 LBS | SYSTOLIC BLOOD PRESSURE: 111 MMHG | RESPIRATION RATE: 16 BRPM | HEART RATE: 86 BPM

## 2025-07-31 DIAGNOSIS — Z95.5 PRESENCE OF CORONARY ANGIOPLASTY IMPLANT AND GRAFT: ICD-10-CM

## 2025-07-31 DIAGNOSIS — N40.0 BENIGN PROSTATIC HYPERPLASIA WITHOUT LOWER URINARY TRACT SYMPMS: ICD-10-CM

## 2025-07-31 PROCEDURE — 99214 OFFICE O/P EST MOD 30 MIN: CPT

## 2025-08-07 ENCOUNTER — APPOINTMENT (OUTPATIENT)
Dept: CARDIOTHORACIC SURGERY | Facility: CLINIC | Age: 85
End: 2025-08-07
Payer: MEDICARE

## 2025-08-07 VITALS
SYSTOLIC BLOOD PRESSURE: 109 MMHG | HEIGHT: 64 IN | BODY MASS INDEX: 36.19 KG/M2 | DIASTOLIC BLOOD PRESSURE: 64 MMHG | OXYGEN SATURATION: 95 % | HEART RATE: 91 BPM | RESPIRATION RATE: 16 BRPM | WEIGHT: 212 LBS | TEMPERATURE: 98.9 F

## 2025-08-07 DIAGNOSIS — E78.5 HYPERLIPIDEMIA, UNSPECIFIED: ICD-10-CM

## 2025-08-07 DIAGNOSIS — E11.9 TYPE 2 DIABETES MELLITUS W/OUT COMPLICATIONS: ICD-10-CM

## 2025-08-07 DIAGNOSIS — I10 ESSENTIAL (PRIMARY) HYPERTENSION: ICD-10-CM

## 2025-08-07 DIAGNOSIS — Z95.2 PRESENCE OF PROSTHETIC HEART VALVE: ICD-10-CM

## 2025-08-07 PROCEDURE — 99213 OFFICE O/P EST LOW 20 MIN: CPT

## 2025-08-07 RX ORDER — CLOPIDOGREL BISULFATE 75 MG/1
75 TABLET, FILM COATED ORAL DAILY
Qty: 90 | Refills: 3 | Status: DISCONTINUED | COMMUNITY
Start: 2025-07-25 | End: 2025-08-07

## 2025-08-07 RX ORDER — SITAGLIPTIN 100 MG/1
100 TABLET, FILM COATED ORAL DAILY
Qty: 30 | Refills: 1 | Status: ACTIVE | COMMUNITY
Start: 2025-07-31 | End: 1900-01-01

## 2025-08-19 ENCOUNTER — TRANSCRIPTION ENCOUNTER (OUTPATIENT)
Age: 85
End: 2025-08-19

## 2025-08-21 ENCOUNTER — APPOINTMENT (OUTPATIENT)
Dept: ELECTROPHYSIOLOGY | Facility: CLINIC | Age: 85
End: 2025-08-21

## 2025-08-21 ENCOUNTER — APPOINTMENT (OUTPATIENT)
Dept: CARDIOTHORACIC SURGERY | Facility: CLINIC | Age: 85
End: 2025-08-21

## 2025-08-21 ENCOUNTER — APPOINTMENT (OUTPATIENT)
Dept: CARDIOTHORACIC SURGERY | Facility: CLINIC | Age: 85
End: 2025-08-21
Payer: MEDICARE

## 2025-08-21 VITALS
HEART RATE: 78 BPM | WEIGHT: 215 LBS | HEIGHT: 67 IN | SYSTOLIC BLOOD PRESSURE: 124 MMHG | DIASTOLIC BLOOD PRESSURE: 58 MMHG | BODY MASS INDEX: 33.74 KG/M2

## 2025-08-21 VITALS
BODY MASS INDEX: 34.55 KG/M2 | TEMPERATURE: 98.6 F | WEIGHT: 215 LBS | HEART RATE: 81 BPM | SYSTOLIC BLOOD PRESSURE: 119 MMHG | DIASTOLIC BLOOD PRESSURE: 57 MMHG | HEIGHT: 66 IN | RESPIRATION RATE: 14 BRPM | OXYGEN SATURATION: 96 %

## 2025-08-21 DIAGNOSIS — I35.0 NONRHEUMATIC AORTIC (VALVE) STENOSIS: ICD-10-CM

## 2025-08-21 DIAGNOSIS — I48.91 UNSPECIFIED ATRIAL FIBRILLATION: ICD-10-CM

## 2025-08-21 DIAGNOSIS — I10 ESSENTIAL (PRIMARY) HYPERTENSION: ICD-10-CM

## 2025-08-21 DIAGNOSIS — Z95.5 PRESENCE OF CORONARY ANGIOPLASTY IMPLANT AND GRAFT: ICD-10-CM

## 2025-08-21 PROCEDURE — G2211 COMPLEX E/M VISIT ADD ON: CPT

## 2025-08-21 PROCEDURE — 99213 OFFICE O/P EST LOW 20 MIN: CPT

## 2025-08-21 PROCEDURE — 93000 ELECTROCARDIOGRAM COMPLETE: CPT

## 2025-08-21 PROCEDURE — 99205 OFFICE O/P NEW HI 60 MIN: CPT

## 2025-08-21 RX ORDER — ATORVASTATIN CALCIUM 20 MG/1
20 TABLET, FILM COATED ORAL
Refills: 0 | Status: ACTIVE | COMMUNITY

## 2025-08-22 ENCOUNTER — APPOINTMENT (OUTPATIENT)
Dept: CARDIOLOGY | Facility: CLINIC | Age: 85
End: 2025-08-22
Payer: MEDICARE

## 2025-08-22 DIAGNOSIS — Z95.2 PRESENCE OF PROSTHETIC HEART VALVE: ICD-10-CM

## 2025-08-22 PROCEDURE — 93306 TTE W/DOPPLER COMPLETE: CPT

## 2025-08-26 ENCOUNTER — APPOINTMENT (OUTPATIENT)
Dept: CARDIOLOGY | Facility: CLINIC | Age: 85
End: 2025-08-26